# Patient Record
Sex: MALE | Race: WHITE | NOT HISPANIC OR LATINO | Employment: FULL TIME | URBAN - METROPOLITAN AREA
[De-identification: names, ages, dates, MRNs, and addresses within clinical notes are randomized per-mention and may not be internally consistent; named-entity substitution may affect disease eponyms.]

---

## 2017-09-26 ENCOUNTER — OFFICE VISIT (OUTPATIENT)
Dept: OCCUPATIONAL MEDICINE | Facility: CLINIC | Age: 22
End: 2017-09-26
Payer: OTHER MISCELLANEOUS

## 2017-09-26 VITALS
HEIGHT: 71 IN | DIASTOLIC BLOOD PRESSURE: 79 MMHG | SYSTOLIC BLOOD PRESSURE: 135 MMHG | HEART RATE: 52 BPM | WEIGHT: 150 LBS | RESPIRATION RATE: 18 BRPM | BODY MASS INDEX: 21 KG/M2 | TEMPERATURE: 98 F | OXYGEN SATURATION: 100 %

## 2017-09-26 DIAGNOSIS — T14.8XXA SPRAIN AND STRAIN: ICD-10-CM

## 2017-09-26 DIAGNOSIS — S33.5XXA LOW BACK SPRAIN, INITIAL ENCOUNTER: Primary | ICD-10-CM

## 2017-09-26 DIAGNOSIS — M54.6 ACUTE BILATERAL THORACIC BACK PAIN: ICD-10-CM

## 2017-09-26 DIAGNOSIS — M62.838 MUSCLE SPASM: ICD-10-CM

## 2017-09-26 PROCEDURE — 3008F BODY MASS INDEX DOCD: CPT | Mod: S$GLB,,, | Performed by: INTERNAL MEDICINE

## 2017-09-26 PROCEDURE — 99203 OFFICE O/P NEW LOW 30 MIN: CPT | Mod: S$GLB,,, | Performed by: INTERNAL MEDICINE

## 2017-09-26 RX ORDER — ZINC GLUCONATE 50 MG
50 TABLET ORAL DAILY
COMMUNITY
End: 2023-06-13

## 2017-09-26 RX ORDER — PNV NO.95/FERROUS FUM/FOLIC AC 28MG-0.8MG
100 TABLET ORAL DAILY
COMMUNITY
End: 2023-06-13

## 2017-09-26 NOTE — PROGRESS NOTES
Subjective:       Patient ID: Babar Casper is a 22 y.o. male.    Chief Complaint: Back Pain (Pt here for w/c visit for back pain. Pt states he was holding a bag of food on his shoulder and twisted and felt a shooting pain in his lower to mid back on 09/24/2017)    Back Pain   This is a new problem. The current episode started in the past 7 days. The problem occurs constantly. The problem has been gradually worsening since onset. The pain is present in the lumbar spine. The quality of the pain is described as shooting. The pain does not radiate. The pain is at a severity of 6/10. The pain is mild. The pain is the same all the time. The symptoms are aggravated by bending and sitting. Stiffness is present all day. Pertinent negatives include no abdominal pain, bladder incontinence, bowel incontinence, dysuria, leg pain, numbness, tingling or weakness. He has tried heat for the symptoms. The treatment provided no relief.     Review of Systems   Constitution: Negative for weakness and malaise/fatigue.   Skin: Negative for color change and rash.   Musculoskeletal: Positive for back pain, muscle cramps and stiffness. Negative for muscle weakness.   Gastrointestinal: Negative for abdominal pain and bowel incontinence.   Genitourinary: Negative for bladder incontinence, dysuria, hematuria and urgency.   Neurological: Negative for disturbances in coordination, numbness and tingling.       Objective:      Physical Exam   Constitutional: He is oriented to person, place, and time. Vital signs are normal. He appears well-developed and well-nourished. He is active and cooperative. No distress.   HENT:   Head: Normocephalic and atraumatic.   Nose: Nose normal.   Mouth/Throat: Oropharynx is clear and moist and mucous membranes are normal.   Eyes: Conjunctivae and lids are normal.   Neck: Trachea normal, normal range of motion, full passive range of motion without pain and phonation normal. Neck supple.   Cardiovascular: Normal  rate, regular rhythm, normal heart sounds, intact distal pulses and normal pulses.    Pulmonary/Chest: Effort normal and breath sounds normal.   Abdominal: Soft. Normal appearance and bowel sounds are normal. He exhibits no abdominal bruit, no pulsatile midline mass and no mass.   Musculoskeletal: He exhibits tenderness. He exhibits no edema or deformity.   Thoracic & Lumber back pain x2 days After lifting heavy weight at work/ Muscle spasm/ No fall/ no neuro  Deficit/seviarity of pain 6/ no swelling no redness/ SLE test bilaterally negative/Sensory motor & reflexes normal/  X ray Thoracic & Lumber spine normal/ Awaiting radiologies reopor   Neurological: He is alert and oriented to person, place, and time. He has normal strength and normal reflexes. He displays normal reflexes. No cranial nerve deficit. He exhibits normal muscle tone. Coordination normal.   Skin: Skin is warm, dry and intact. He is not diaphoretic.   Psychiatric: He has a normal mood and affect. His speech is normal and behavior is normal. Judgment and thought content normal. Cognition and memory are normal.   Nursing note and vitals reviewed.      Assessment:       1. Low back sprain, initial encounter    2. Acute bilateral thoracic back pain    3. Muscle spasm    4. Sprain and strain        Plan:                   No Follow-up on file.

## 2017-09-28 ENCOUNTER — OFFICE VISIT (OUTPATIENT)
Dept: OCCUPATIONAL MEDICINE | Facility: CLINIC | Age: 22
End: 2017-09-28
Payer: OTHER MISCELLANEOUS

## 2017-09-28 VITALS
OXYGEN SATURATION: 100 % | HEIGHT: 71 IN | SYSTOLIC BLOOD PRESSURE: 117 MMHG | WEIGHT: 150 LBS | DIASTOLIC BLOOD PRESSURE: 66 MMHG | BODY MASS INDEX: 21 KG/M2 | TEMPERATURE: 99 F | HEART RATE: 54 BPM | RESPIRATION RATE: 16 BRPM

## 2017-09-28 DIAGNOSIS — M54.9 BACK PAIN, UNSPECIFIED BACK LOCATION, UNSPECIFIED BACK PAIN LATERALITY, UNSPECIFIED CHRONICITY: Primary | ICD-10-CM

## 2017-09-28 PROCEDURE — 99213 OFFICE O/P EST LOW 20 MIN: CPT | Mod: S$GLB,,, | Performed by: FAMILY MEDICINE

## 2017-09-28 NOTE — PROGRESS NOTES
Subjective:       Patient ID: Babar Casper is a 22 y.o. male.    Chief Complaint: Back Injury (w/c follow up)    Back Pain   This is a new problem. The current episode started in the past 7 days. The problem occurs intermittently. The problem has been rapidly improving since onset. Quality: dull. The pain is at a severity of 1/10. The pain is mild. Pertinent negatives include no abdominal pain, bladder incontinence, bowel incontinence, dysuria, headaches, leg pain or numbness. He has tried nothing for the symptoms.     Review of Systems   Constitution: Negative for malaise/fatigue.   Skin: Negative for rash.   Musculoskeletal: Positive for back pain. Negative for muscle cramps, muscle weakness and stiffness.   Gastrointestinal: Negative for abdominal pain and bowel incontinence.   Genitourinary: Negative for bladder incontinence, dysuria, hematuria and urgency.   Neurological: Negative for disturbances in coordination, headaches and numbness.       Objective:      Physical Exam   Constitutional: He is oriented to person, place, and time. He appears well-developed and well-nourished.   HENT:   Head: Normocephalic and atraumatic.   Eyes: EOM are normal. Pupils are equal, round, and reactive to light.   Neck: Normal range of motion. Neck supple.   Musculoskeletal: Normal range of motion. He exhibits no tenderness.   Neurological: He is alert and oriented to person, place, and time.   Skin: Skin is warm and dry.       Assessment:       1. Back pain, unspecified back location, unspecified back pain laterality, unspecified chronicity        Plan:                Restrictions: Discharged from Occupational Health  No Follow-up on file.

## 2018-04-20 ENCOUNTER — OFFICE VISIT (OUTPATIENT)
Dept: URGENT CARE | Facility: CLINIC | Age: 23
End: 2018-04-20
Payer: COMMERCIAL

## 2018-04-20 VITALS
BODY MASS INDEX: 21 KG/M2 | RESPIRATION RATE: 18 BRPM | SYSTOLIC BLOOD PRESSURE: 116 MMHG | WEIGHT: 150 LBS | HEART RATE: 50 BPM | DIASTOLIC BLOOD PRESSURE: 65 MMHG | TEMPERATURE: 98 F | OXYGEN SATURATION: 99 % | HEIGHT: 71 IN

## 2018-04-20 DIAGNOSIS — M54.50 ACUTE RIGHT-SIDED LOW BACK PAIN WITHOUT SCIATICA: Primary | ICD-10-CM

## 2018-04-20 PROCEDURE — 99203 OFFICE O/P NEW LOW 30 MIN: CPT | Mod: S$GLB,,, | Performed by: FAMILY MEDICINE

## 2018-04-20 RX ORDER — NAPROXEN 500 MG/1
500 TABLET ORAL 2 TIMES DAILY WITH MEALS
Qty: 30 TABLET | Refills: 0 | Status: SHIPPED | OUTPATIENT
Start: 2018-04-20 | End: 2019-04-20

## 2018-04-20 RX ORDER — METHOCARBAMOL 500 MG/1
500 TABLET, FILM COATED ORAL 2 TIMES DAILY PRN
Qty: 30 TABLET | Refills: 0 | Status: SHIPPED | OUTPATIENT
Start: 2018-04-20 | End: 2018-04-30

## 2018-04-20 NOTE — PROGRESS NOTES
"Subjective:       Patient ID: Babar Casper is a 22 y.o. male.    Vitals:  height is 5' 11" (1.803 m) and weight is 68 kg (150 lb). His temperature is 98 °F (36.7 °C). His blood pressure is 116/65 and his pulse is 50 (abnormal). His respiration is 18 and oxygen saturation is 99%.     Chief Complaint: Back Pain    Lower back pain  After twisting the wrong way at work trying to catch a monkey. Worried that work would take action against him bc he got hurt and felt he needed a couple days off      Back Pain   This is a new problem. The current episode started in the past 7 days. The problem occurs constantly. The problem is unchanged. The pain is present in the lumbar spine. The pain radiates to the right thigh. The pain is at a severity of 5/10. Pertinent negatives include no abdominal pain, bladder incontinence, bowel incontinence, dysuria or numbness. He has tried NSAIDs for the symptoms. The treatment provided mild relief.     Review of Systems   Constitution: Negative for malaise/fatigue.   Skin: Negative for color change and rash.   Musculoskeletal: Positive for back pain. Negative for muscle cramps, muscle weakness and stiffness.   Gastrointestinal: Negative for abdominal pain and bowel incontinence.   Genitourinary: Negative for bladder incontinence, dysuria, hematuria and urgency.   Neurological: Negative for disturbances in coordination and numbness.       Objective:      Physical Exam   Constitutional: He is oriented to person, place, and time. Vital signs are normal. He appears well-developed and well-nourished. He is active and cooperative. No distress.   HENT:   Head: Normocephalic and atraumatic.   Nose: Nose normal.   Mouth/Throat: Mucous membranes are normal.   Eyes: Conjunctivae and lids are normal.   Neck: Trachea normal, normal range of motion, full passive range of motion without pain and phonation normal. Neck supple.   Cardiovascular: Normal rate and normal pulses.    Pulmonary/Chest: Effort " normal.   Abdominal: Normal appearance. He exhibits no abdominal bruit, no pulsatile midline mass and no mass.   Musculoskeletal: Normal range of motion. He exhibits tenderness. He exhibits no edema or deformity.        Lumbar back: He exhibits tenderness and pain. He exhibits no bony tenderness and no swelling.        Back:    Pain with straight leg raise and figure four in both legs   Neurological: He is alert and oriented to person, place, and time. He has normal strength and normal reflexes. No sensory deficit.   Skin: Skin is warm, dry and intact. He is not diaphoretic.   Psychiatric: He has a normal mood and affect. His speech is normal and behavior is normal. Judgment and thought content normal. Cognition and memory are normal.   Nursing note and vitals reviewed.      Assessment:       1. Acute right-sided low back pain without sciatica        Plan:         Acute right-sided low back pain without sciatica    Other orders  -     naproxen (NAPROSYN) 500 MG tablet; Take 1 tablet (500 mg total) by mouth 2 (two) times daily with meals.  Dispense: 30 tablet; Refill: 0  -     methocarbamol (ROBAXIN) 500 MG Tab; Take 1 tablet (500 mg total) by mouth 2 (two) times daily as needed.  Dispense: 30 tablet; Refill: 0        advised that he should stretch daily prior to and during work. Advised to tell supervisor abt incident in case of future issues.

## 2018-04-20 NOTE — LETTER
April 20, 2018      [facility] [address], [address]  [facility] [address]  Phone: [phone]  Fax: [phone]       Patient: Babar Casper   YOB: 1995  Date of Visit: 04/20/2018    To Whom It May Concern:    Sanjiv Casper  was at [facility] on 04/20/2018. Please excuse for 4/19-4/20. If you have any questions or concerns, or if I can be of further assistance, please do not hesitate to contact me.    Sincerely,    Raj Radford MD

## 2018-04-20 NOTE — PATIENT INSTRUCTIONS
NECK/BACK PAIN [General]  Both neck and back pain are usually caused by injury to the muscles or ligaments of the spine. Sometimes the disks that separate each bone of the spine may cause pain by putting pressure on a nearby nerve. Back and neck pain may appear after a sudden twisting/bending force (such as in a car accident), or sometimes after a simple awkward movement. In either case, muscle spasm is often present and adds to the pain.    Acute neck and back pain usually gets better in one to two weeks. Pain related to disk disease, arthritis in the spinal joints or spinal stenosis (narrowing of the spinal canal) can become chronic and last for months or years.    Unless you had a forceful physical injury (for example, a car accident or fall), X-rays are usually not ordered for the initial evaluation of neck pain due to the risk of unnecessary radiation caused by xrays . If your pain does not respond to treatment course, x-rays and other tests may be performed at a later time.     Pain is generally related to inflammatory process that results from the injury.Treatment is focused to anti-inflammatory medicine and muscle relaxants. Narcotics have not been shown to be beneficial for acute low back or neck pain but actually may do some harm because it actually masks the pain and you may perform tasks you should not because the pain reflex has been blocked.       HOME CARE:  1. FOR NECK PAIN: Use a comfortable pillow that supports the head and keeps the spine in a neutral position. The position of the head should not be tilted forward or backward.  FOR BACK PAIN: You can stay in bed the first few days. But, as soon as possible, begin sitting or walking to avoid problems with prolonged bed rest (muscle weakness, worsening back stiffness and pain, blood clots in the legs).  2. When in bed, try to find a position of comfort. A firm mattress is best. Try lying flat on your back with pillows under your knees. You can also  try lying on your side with your knees bent up towards your chest and a pillow between your knees.  3. Avoid prolonged sitting. This puts more stress on the lower back than standing or walking.  4. During the first two days after injury, apply an ICE PACK to the painful area for 20 minutes every 2-4 hours. This will reduce swelling and pain. HEAT (hot shower, hot bath or heating pad) works well for muscle spasm. You can start with ice, then switch to heat after two days. Some patients feel best alternating ice and heat treatments. Use the one method that feels the best to you.  5. You may use acetaminophen (Tylenol) or ibuprofen (Motrin, Advil) to control pain, unless another pain medicine was prescribed. [NOTE: If you have chronic liver or kidney disease or ever had a stomach ulcer or GI bleeding, talk with your doctor before using these medicines.] Do not take these medications if it is a known allergy.  6. Be aware of safe lifting methods and do not lift anything over 15 pounds until all the pain is gone.    FOLLOW UP with your physician or this facility if your symptoms do not start to improve after one week. Physical therapy or further tests may be needed.      GET PROMPT MEDICAL ATTENTION if any of the following occur:  Pain becomes worse or spreads into your arms or legs  Weakness, numbness or pain in one or both arms or legs  Loss of bowel or bladder control  Numbness in the groin area  Difficulty walking  Fever over 100.0ºF (37.8ºC)    NECK SPRAIN or STRAIN    A sudden force that causes turning or bending of the neck (such as in a car accident) can stretch or tear muscles (strain) and ligaments (sprain) and cause neck pain. Sometimes neck pain occurs after a simple awkward movement. In either case, muscle spasm is commonly present and contributes to the pain.     HOME CARE:    1) You may feel more soreness and spasm the first few days after the injury. Reduce your activity level until symptoms begin to  improve.    2) When lying down, use a comfortable pillow that supports the head and keeps the spine in a neutral position. The position of the head should not be tilted forward or backward.    3) Use ice packs (ice in a plastic bag, wrapped in a towel) to treat acute pain. Apply for 20 minutes every 2-4 hours during the first two days. Then, begin local heat (hot shower, hot bath or heating pad) and massage to reduce muscle spasm. Some patients feel best alternating hot and cold treatments, or just staying with one method only. Do what feels the best to you and gives the most relief.    4) You may use acetaminophen (Tylenol) or ibuprofen (Motrin, Advil) to control pain, unless another pain medicine was prescribed. [ NOTE : If you have chronic liver or kidney disease or ever had a stomach ulcer or GI bleeding, talk with your doctor before using these medicines.]    FOLLOW UP with your physician or this facility if your symptoms do not show signs of improvement after one week. Physical therapy may be needed.    GET PROMPT MEDICAL ATTENTION if any of the following occur:  -- Pain becomes worse or spreads into your arms  -- Weakness or numbness in one or both arms      NEUROPATHY    is a condition that affects the nerves of the arms or legs. It causes a change in physical feeling. Sometimes it causes weakness in the muscles. You may feel tingling, numbness or shooting pains (especially at night). You may be sensitive to light touch or temperature changes.    Neuropathy may be caused by being exposed to certain drugs or chemicals, or because of a vitamin deficiency. It can be a complication of a chronic disease such as diabetes or alcoholism. A muscle spasm or bulging/ruptured disk with pressure on the nerve may also cause this condition.    HOME CARE:    1) You may take acetaminophen (Tylenol) or ibuprofen (Advil, Motrin) for pain, unless another pain medicine has been prescribed.    2) If the neuropathy affects your  feet, keep your toenails trimmed and wash your feet often. Wear shoes that fit well. Doing so avoids pressure points, blisters and ulcers. Due to a loss of feeling, you may not notice injuries, so look at your feet carefully (including the soles of your feet and between your toes) at least once a week. Tell your doctor if you have any open wounds or signs of infection.    3) If vitamins have been prescribed, be sure to remind yourself to take them daily.    FOLLOW UP with your doctor or as advised by our staff. You may need further testing to find out the exact cause of your neuropathy.    GET PROMPT MEDICAL ATTENTION if any of the following occur:    -- Redness, swelling or pus coming from the toes or feet    -- Loss of bowel or bladder control (if this is a new symptom for you)    -- Muscle weakness (if this is a new symptom for you)

## 2018-04-23 ENCOUNTER — TELEPHONE (OUTPATIENT)
Dept: URGENT CARE | Facility: CLINIC | Age: 23
End: 2018-04-23

## 2018-06-19 ENCOUNTER — OFFICE VISIT (OUTPATIENT)
Dept: URGENT CARE | Facility: CLINIC | Age: 23
End: 2018-06-19
Payer: COMMERCIAL

## 2018-06-19 VITALS
TEMPERATURE: 98 F | WEIGHT: 150 LBS | SYSTOLIC BLOOD PRESSURE: 127 MMHG | OXYGEN SATURATION: 100 % | BODY MASS INDEX: 21 KG/M2 | HEIGHT: 71 IN | RESPIRATION RATE: 17 BRPM | HEART RATE: 45 BPM | DIASTOLIC BLOOD PRESSURE: 70 MMHG

## 2018-06-19 DIAGNOSIS — R10.9 STOMACH PAIN: ICD-10-CM

## 2018-06-19 DIAGNOSIS — K59.1 FUNCTIONAL DIARRHEA: Primary | ICD-10-CM

## 2018-06-19 PROCEDURE — 99214 OFFICE O/P EST MOD 30 MIN: CPT | Mod: S$GLB,,, | Performed by: PHYSICIAN ASSISTANT

## 2018-06-19 NOTE — PATIENT INSTRUCTIONS
Treating Diarrhea    Diarrhea happens when you have loose, watery, or frequent bowel movements. It is a common problem with many causes. Most cases of diarrhea clear up on their own. But certain cases may need treatment. Be sure to see your healthcare provider if your symptoms do not improve within a few days.  Getting relief  Treatment of diarrhea depends on its cause. Diarrhea caused by bacterial or parasite infection is often treated with antibiotics. Diarrhea caused by other factors, such as a stomach virus, often improves with simple home treatment. The tips below may also help relieve your symptoms.  · Drink plenty of fluids. This helps prevent too much fluid loss (dehydration). Water, clear soups, and electrolyte solutions are good choices. Avoid alcohol, coffee, tea, and milk. These can irritate your intestines and make symptoms worse.  · Suck on ice chips if drinking makes you queasy.  · Return to your normal diet slowly. You may want to eat bland foods at first, such as rice and toast. Also, you may need to avoid certain foods for a while, such as dairy products. These can make symptoms worse. Ask your healthcare provider if there are any other foods you should avoid.  · If you were prescribed antibiotics, take them as directed.  · Do not take anti-diarrhea medicines without asking your healthcare provider first.  Call your healthcare provider   Call your healthcare provider if you have any of the following:   · A fever of 100.4°F (38.0°C) or higher, or as directed by your healthcare provider  · Severe pain  · Worsening diarrhea or diarrhea for more than 2 days  · Bloody vomit or stool  · Signs of dehydration (dizziness, dry mouth and tongue, rapid pulse, dark urine)  Date Last Reviewed: 7/1/2016 © 2000-2017 [employer]. [address] [place] [address], [place] [address]. All rights reserved. This information is not intended as a substitute for professional medical care. Always follow your  healthcare professional's instructions.        Gastritis (Adult)    Gastritis is inflammation and irritation of the stomach lining. It can be present for a short time (acute) or be long lasting (chronic). Gastritis is often caused by infection with bacteria called H pylori. More than a third of people in the [place] have this bacteria in their bodies. In many cases, H pylori causes no problems or symptoms. In some people, though, the infection irritates the stomach lining and causes gastritis. Other causes of stomach irritation include drinking alcohol or taking pain-relieving medicines called NSAIDs (such as aspirin or ibuprofen).   Symptoms of gastritis can include:  · Abdominal pain or bloating  · Loss of appetite  · Nausea or vomiting  · Vomiting blood or having black stools  · Feeling more tired than usual  An inflamed and irritated stomach lining is more likely to develop a sore called an ulcer. To help prevent this, gastritis should be treated.  Home care  If needed, medicines may be prescribed. If you have H pylori infection, treating it will likely relieve your symptoms. Other changes can help reduce stomach irritation and help it heal.  · If you have been prescribed medicines for H pylori infection, take them as directed. Take all of the medicine until it is finished or your healthcare provider tells you to stop, even if you feel better.  · Your healthcare provider may recommend avoiding NSAIDs. If you take daily aspirin for your heart or other medical reasons, do not stop without talking to your healthcare provider first.  · Avoid drinking alcohol.  · Stop smoking. Smoking can irritate the stomach and delay healing. As much as possible, stay away from second hand smoke.  Follow-up care  Follow up with your healthcare provider, or as advised by our staff. Testing may be needed to check for inflammation or an ulcer.  When to seek medical advice  Call your healthcare provider for any of the following:  · Stomach  pain that gets worse or moves to the lower right abdomen (appendix area)  · Chest pain that appears or gets worse, or spreads to the back, neck, shoulder, or arm  · Frequent vomiting (cant keep down liquids)  · Blood in the stool or vomit (red or black in color)  · Feeling weak or dizzy  · Fever of 100.4ºF (38ºC) or higher, or as directed by your healthcare provider  Date Last Reviewed: 6/22/2015  © 0532-2716 [employer]. [address] [place] [address]. All rights reserved. This information is not intended as a substitute for professional medical care. Always follow your healthcare professional's instructions.      If you were prescribed a narcotic medication, do not drive or operate heavy equipment or machinery while taking these medications.  You must understand that you've received an Urgent Care treatment only and that you may be released before all your medical problems are known or treated. You, the patient, will arrange for follow up care as instructed.  Follow up with your PCP or specialty clinic as directed in the next 1-2 weeks if not improved or as needed.  You can call [phone] to schedule an appointment with the appropriate provider.  If your condition worsens we recommend that you receive another evaluation at the emergency room immediately or contact your primary medical clinics after hours call service to discuss your concerns.  Please return here or go to the Emergency Department for any concerns or worsening of condition.

## 2018-06-19 NOTE — LETTER
June 19, 2018      [facility] [address], [address]  [place] [address]  Phone: [phone]  Fax: [phone]       Patient: Babar Casper   YOB: 1995  Date of Visit: 06/19/2018    To Whom It May Concern:    Sanjiv Casper  was at [facility] on 06/19/2018. He may return to work/school on 6/21/18 with no restrictions. If you have any questions or concerns, or if I can be of further assistance, please do not hesitate to contact me.    Sincerely,    Lon Hobson PA-C

## 2018-06-19 NOTE — PROGRESS NOTES
"Subjective:       Patient ID: Babar Casper is a 22 y.o. male.    Vitals:  height is 5' 11" (1.803 m) and weight is 68 kg (150 lb). His oral temperature is 97.6 °F (36.4 °C). His blood pressure is 127/70 and his pulse is 45 (abnormal). His respiration is 17 and oxygen saturation is 100%.     Chief Complaint: Abdominal Pain    Patient states he went out to a local pizza place on Friday, drank "too much beer" and had a lot of pizza. He hasn't eaten wheat, gluten or dairy "in years"; Patient complains of stomach pain, nausea, bloating and diarrhea. Symptoms began on Sunday, symptoms have since worsened. Patient states it is now a burning sensation. Last time patient ate anything was yesterday around 6pm, patient has only been drinking water. Patient denies vomiting. No recent illness, hospitalization or abx use.      Abdominal Pain   This is a new problem. The current episode started in the past 7 days. The problem occurs constantly. The problem has been gradually worsening. The pain is located in the periumbilical region. The pain is at a severity of 4/10. The pain is mild. The quality of the pain is burning. Associated symptoms include diarrhea and nausea. Pertinent negatives include no constipation, dysuria, fever, hematochezia, melena or vomiting. Nothing aggravates the pain. The pain is relieved by nothing. He has tried nothing for the symptoms.     Review of Systems   Constitution: Negative for chills and fever.   Cardiovascular: Negative for chest pain.   Respiratory: Negative for shortness of breath.    Musculoskeletal: Negative for back pain.   Gastrointestinal: Positive for abdominal pain, diarrhea and nausea. Negative for constipation, hematochezia, melena and vomiting.   Genitourinary: Negative for dysuria.       Objective:      Physical Exam   Constitutional: He is oriented to person, place, and time. He appears well-developed and well-nourished.   HENT:   Head: Normocephalic and atraumatic.   Right Ear: " External ear normal.   Left Ear: External ear normal.   Nose: Nose normal.   Mouth/Throat: Mucous membranes are normal.   Eyes: Conjunctivae and lids are normal.   Neck: Trachea normal and full passive range of motion without pain. Neck supple.   Cardiovascular: Normal rate, regular rhythm and normal heart sounds.    Pulmonary/Chest: Effort normal and breath sounds normal. No respiratory distress.   Abdominal: Soft. Normal appearance and bowel sounds are normal. He exhibits no distension, no abdominal bruit, no pulsatile midline mass and no mass. There is no tenderness. There is no rigidity, no rebound, no guarding, no CVA tenderness, no tenderness at McBurney's point and negative Kwong's sign.   Musculoskeletal: Normal range of motion. He exhibits no edema.   Neurological: He is alert and oriented to person, place, and time. He has normal strength.   Skin: Skin is warm, dry and intact. He is not diaphoretic. No pallor.   Psychiatric: He has a normal mood and affect. His speech is normal and behavior is normal. Judgment and thought content normal. Cognition and memory are normal.   Nursing note and vitals reviewed.      Assessment:       1. Functional diarrhea    2. Stomach pain        Plan:         Functional diarrhea    Stomach pain        Treating Diarrhea    Diarrhea happens when you have loose, watery, or frequent bowel movements. It is a common problem with many causes. Most cases of diarrhea clear up on their own. But certain cases may need treatment. Be sure to see your healthcare provider if your symptoms do not improve within a few days.  Getting relief  Treatment of diarrhea depends on its cause. Diarrhea caused by bacterial or parasite infection is often treated with antibiotics. Diarrhea caused by other factors, such as a stomach virus, often improves with simple home treatment. The tips below may also help relieve your symptoms.  · Drink plenty of fluids. This helps prevent too much fluid loss  (dehydration). Water, clear soups, and electrolyte solutions are good choices. Avoid alcohol, coffee, tea, and milk. These can irritate your intestines and make symptoms worse.  · Suck on ice chips if drinking makes you queasy.  · Return to your normal diet slowly. You may want to eat bland foods at first, such as rice and toast. Also, you may need to avoid certain foods for a while, such as dairy products. These can make symptoms worse. Ask your healthcare provider if there are any other foods you should avoid.  · If you were prescribed antibiotics, take them as directed.  · Do not take anti-diarrhea medicines without asking your healthcare provider first.  Call your healthcare provider   Call your healthcare provider if you have any of the following:   · A fever of 100.4°F (38.0°C) or higher, or as directed by your healthcare provider  · Severe pain  · Worsening diarrhea or diarrhea for more than 2 days  · Bloody vomit or stool  · Signs of dehydration (dizziness, dry mouth and tongue, rapid pulse, dark urine)  Date Last Reviewed: 7/1/2016 © 2000-2017 [employer]. [address] [place] [address]. All rights reserved. This information is not intended as a substitute for professional medical care. Always follow your healthcare professional's instructions.        Gastritis (Adult)    Gastritis is inflammation and irritation of the stomach lining. It can be present for a short time (acute) or be long lasting (chronic). Gastritis is often caused by infection with bacteria called H pylori. More than a third of people in the [place] have this bacteria in their bodies. In many cases, H pylori causes no problems or symptoms. In some people, though, the infection irritates the stomach lining and causes gastritis. Other causes of stomach irritation include drinking alcohol or taking pain-relieving medicines called NSAIDs (such as aspirin or ibuprofen).   Symptoms of gastritis can include:  · Abdominal pain  or bloating  · Loss of appetite  · Nausea or vomiting  · Vomiting blood or having black stools  · Feeling more tired than usual  An inflamed and irritated stomach lining is more likely to develop a sore called an ulcer. To help prevent this, gastritis should be treated.  Home care  If needed, medicines may be prescribed. If you have H pylori infection, treating it will likely relieve your symptoms. Other changes can help reduce stomach irritation and help it heal.  · If you have been prescribed medicines for H pylori infection, take them as directed. Take all of the medicine until it is finished or your healthcare provider tells you to stop, even if you feel better.  · Your healthcare provider may recommend avoiding NSAIDs. If you take daily aspirin for your heart or other medical reasons, do not stop without talking to your healthcare provider first.  · Avoid drinking alcohol.  · Stop smoking. Smoking can irritate the stomach and delay healing. As much as possible, stay away from second hand smoke.  Follow-up care  Follow up with your healthcare provider, or as advised by our staff. Testing may be needed to check for inflammation or an ulcer.  When to seek medical advice  Call your healthcare provider for any of the following:  · Stomach pain that gets worse or moves to the lower right abdomen (appendix area)  · Chest pain that appears or gets worse, or spreads to the back, neck, shoulder, or arm  · Frequent vomiting (cant keep down liquids)  · Blood in the stool or vomit (red or black in color)  · Feeling weak or dizzy  · Fever of 100.4ºF (38ºC) or higher, or as directed by your healthcare provider  Date Last Reviewed: 6/22/2015 © 2000-2017 [employer]. [address] [place] [address], [place] [address]. All rights reserved. This information is not intended as a substitute for professional medical care. Always follow your healthcare professional's instructions.      If you were prescribed a narcotic  medication, do not drive or operate heavy equipment or machinery while taking these medications.  You must understand that you've received an Urgent Care treatment only and that you may be released before all your medical problems are known or treated. You, the patient, will arrange for follow up care as instructed.  Follow up with your PCP or specialty clinic as directed in the next 1-2 weeks if not improved or as needed.  You can call [phone] to schedule an appointment with the appropriate provider.  If your condition worsens we recommend that you receive another evaluation at the emergency room immediately or contact your primary medical clinics after hours call service to discuss your concerns.  Please return here or go to the Emergency Department for any concerns or worsening of condition.

## 2018-06-22 ENCOUNTER — TELEPHONE (OUTPATIENT)
Dept: URGENT CARE | Facility: CLINIC | Age: 23
End: 2018-06-22

## 2018-07-02 ENCOUNTER — OFFICE VISIT (OUTPATIENT)
Dept: OCCUPATIONAL MEDICINE | Facility: CLINIC | Age: 23
End: 2018-07-02
Payer: COMMERCIAL

## 2018-07-02 VITALS
HEIGHT: 71 IN | OXYGEN SATURATION: 100 % | RESPIRATION RATE: 18 BRPM | SYSTOLIC BLOOD PRESSURE: 121 MMHG | HEART RATE: 57 BPM | BODY MASS INDEX: 21 KG/M2 | TEMPERATURE: 97 F | WEIGHT: 150 LBS | DIASTOLIC BLOOD PRESSURE: 67 MMHG

## 2018-07-02 DIAGNOSIS — S01.119A LACERATION OF EYEBROW AND FOREHEAD, INITIAL ENCOUNTER: Primary | ICD-10-CM

## 2018-07-02 DIAGNOSIS — S01.81XA LACERATION OF EYEBROW AND FOREHEAD, INITIAL ENCOUNTER: Primary | ICD-10-CM

## 2018-07-02 PROCEDURE — 99203 OFFICE O/P NEW LOW 30 MIN: CPT | Mod: 25,S$GLB,, | Performed by: FAMILY MEDICINE

## 2018-07-02 PROCEDURE — 12011 RPR F/E/E/N/L/M 2.5 CM/<: CPT | Mod: S$GLB,,, | Performed by: FAMILY MEDICINE

## 2018-07-02 PROCEDURE — 90471 IMMUNIZATION ADMIN: CPT | Mod: S$GLB,,, | Performed by: FAMILY MEDICINE

## 2018-07-02 PROCEDURE — 90714 TD VACC NO PRESV 7 YRS+ IM: CPT | Mod: S$GLB,,, | Performed by: FAMILY MEDICINE

## 2018-07-02 RX ORDER — MUPIROCIN 20 MG/G
OINTMENT TOPICAL
Qty: 22 G | Refills: 1 | Status: SHIPPED | OUTPATIENT
Start: 2018-07-02 | End: 2023-06-13

## 2018-07-02 NOTE — PATIENT INSTRUCTIONS
You have a work related injury. Medical care and treatment required as a result of a work-related injury  should be focused on restoring functional ability required to meet the patient?s daily and work activities and return to work, while striving to restore the patient?s health to its pre-injury status in so far as is feasible.  Some OTC measures to help in recovery:  Tylenol 325mg  Ibuprofen 400mg  Magnesium   Ice/Heat pack  Stretching/ROM exercises    Face Laceration: Suture or Tape  A laceration is a cut through the skin. This will require stitches if it is deep. Minor cuts may be treated with surgical tape.    Home care  · Your healthcare provider may prescribe an antibiotic. This is to help prevent infection. Follow all instructions for taking this medicine. Take the medicine every day until it is gone or you are told to stop. You should not have any left over.  · The healthcare provider may prescribe medicines for pain. Follow instructions for taking them.  · Follow the healthcare providers instructions on how to care for the cut.  · Wash your hands with soap and warm water before and after caring for the cut. This helps prevent infection.  · If a bandage was applied and it becomes wet or dirty, replace it. Otherwise, leave it in place for the first 24 hours, then change it once a day or as directed.  · If sutures were used, clean the wound daily:  ¨ After removing the bandage, wash the area with soap and water. Use a wet cotton swab to loosen and remove any blood or crust that forms.  ¨ After cleaning, keep the wound clean and dry. Talk with your doctor before applying any antibiotic ointment to the wound. Reapply a fresh bandage.  ¨ You may remove the bandage to shower as usual after the first 24 hours, but do not soak the area in water (no swimming) until the sutures are removed.  · If surgical tape was used, keep the area clean and dry. If it becomes wet, blot it dry with a towel.  · Most facial skin  wounds heal without problems. However, an infection sometimes occurs despite proper treatment. Therefore, watch for the signs of infection listed below.  Follow-up care  Follow up with your healthcare provider as advised. Be sure to return for suture removal as directed. Ask your provider how long sutures should remain in place. If surgical tape closures were used, you may remove them yourself when your provider recommends if they have not fallen off on their own.  When to seek medical advice  Call your healthcare provider right away if any of these occur:  · Wound bleeding not controlled by direct pressure  · Signs of infection, including increasing pain in the wound, increasing wound redness or swelling, or pus or bad odor coming from the wound  · Fever of 100.4°F (38ºC) or higher or as directed by your healthcare provider  · Stitches come apart or fall out or surgical tape falls off before 5 days  · Wound edges re-open  · Wound changes colors  · Numbness around the wound   Date Last Reviewed: 6/14/2015  © 9827-9570 [employer] [address] [place] [address], [place] [address]. All rights reserved. This information is not intended as a substitute for professional medical care. Always follow your healthcare professional's instructions.

## 2018-07-02 NOTE — PROCEDURES
Laceration Repair  Date/Time: 7/2/2018 10:53 AM  Performed by: KENN BAUGH  Authorized by: KENN BAUGH   Body area: head/neck  Location details: left eyebrow  Laceration length: 1.2 cm  Foreign bodies: no foreign bodies  Tendon involvement: none  Nerve involvement: none  Anesthesia: local infiltration    Anesthesia:  Local Anesthetic: lidocaine 1% without epinephrine  Anesthetic total: 2 mL  Tendon closure: 5-0 Prolene  Number of sutures: 3  Technique: simple  Approximation: loose  Approximation difficulty: simple  Dressing: adhesive bandage  Comments: 2 simple 1 corner stitch placed

## 2018-07-02 NOTE — PROGRESS NOTES
Subjective:       Patient ID: Babar Casper is a 22 y.o. male.    Chief Complaint: Laceration    Patient was hit on left eye with a barrel at work.      Laceration    The incident occurred 1 to 3 hours ago. The laceration is located on the face and left eye. The laceration mechanism was a blunt object. He reports no foreign bodies present. His tetanus status is out of date.     Review of Systems   Constitution: Negative for chills and fever.   HENT: Negative for sore throat.    Eyes: Negative for blurred vision.   Cardiovascular: Negative for chest pain.   Respiratory: Negative for shortness of breath.    Skin: Positive for poor wound healing. Negative for rash.   Musculoskeletal: Negative for back pain and joint pain.   Gastrointestinal: Negative for abdominal pain, diarrhea, nausea and vomiting.   Neurological: Negative for headaches.   Psychiatric/Behavioral: The patient is not nervous/anxious.        Objective:      Physical Exam   Constitutional: He is oriented to person, place, and time. He appears well-developed and well-nourished.   HENT:   Head: Normocephalic. Head is with laceration (1.2cm jagged). Head is without abrasion and without contusion.       Right Ear: External ear normal.   Left Ear: External ear normal.   Nose: Nose normal.   Mouth/Throat: Oropharynx is clear and moist.   Eyes: Conjunctivae, EOM and lids are normal. Pupils are equal, round, and reactive to light.   Neck: Trachea normal, full passive range of motion without pain and phonation normal. Neck supple.   Cardiovascular: Normal rate and regular rhythm.    Pulmonary/Chest: Effort normal. No stridor. No respiratory distress.   Musculoskeletal: Normal range of motion.   Neurological: He is alert and oriented to person, place, and time.   Skin: Skin is warm, dry and intact. Capillary refill takes less than 2 seconds. No abrasion, no bruising, no burn, no ecchymosis, no laceration, no lesion and no rash noted. No erythema.   Psychiatric:  He has a normal mood and affect. His speech is normal and behavior is normal. Judgment and thought content normal. Cognition and memory are normal.   Nursing note and vitals reviewed.      Assessment:       1. Laceration of eyebrow and forehead, initial encounter        Plan:           Medications Ordered This Encounter      mupirocin (BACTROBAN) 2 % ointment          Sig: Apply to affected area 3 times daily          Dispense:  22 g          Refill:  1         No Follow-up on file.    5 days for suture removal

## 2018-07-06 ENCOUNTER — OFFICE VISIT (OUTPATIENT)
Dept: OCCUPATIONAL MEDICINE | Facility: CLINIC | Age: 23
End: 2018-07-06
Payer: COMMERCIAL

## 2018-07-06 VITALS
DIASTOLIC BLOOD PRESSURE: 66 MMHG | OXYGEN SATURATION: 100 % | HEIGHT: 71 IN | TEMPERATURE: 99 F | RESPIRATION RATE: 16 BRPM | WEIGHT: 150 LBS | SYSTOLIC BLOOD PRESSURE: 128 MMHG | BODY MASS INDEX: 21 KG/M2 | HEART RATE: 64 BPM

## 2018-07-06 DIAGNOSIS — S01.112D LACERATION OF LEFT EYEBROW, SUBSEQUENT ENCOUNTER: Primary | ICD-10-CM

## 2018-07-06 PROCEDURE — 99214 OFFICE O/P EST MOD 30 MIN: CPT | Mod: S$GLB,,, | Performed by: FAMILY MEDICINE

## 2018-07-06 NOTE — PROGRESS NOTES
Subjective:       Patient ID: Babar Casper is a 22 y.o. male.    Chief Complaint: Suture / Staple Removal    Suture / Staple Removal   The sutures were placed 3 to 6 days ago. He tried nothing since the wound repair. The treatment provided significant relief. His temperature was unmeasured prior to arrival. There has been no drainage from the wound. There is no redness present. There is no swelling present. There is no pain present. He has no difficulty moving the affected extremity or digit.     Review of Systems   Constitution: Negative for chills and fever.   HENT: Negative for sore throat.    Eyes: Negative for blurred vision.   Cardiovascular: Negative for chest pain.   Respiratory: Negative for shortness of breath.    Skin: Negative for rash.   Musculoskeletal: Negative for back pain and joint pain.   Gastrointestinal: Negative for abdominal pain, diarrhea, nausea and vomiting.   Neurological: Negative for headaches.   Psychiatric/Behavioral: The patient is not nervous/anxious.        Objective:      Physical Exam   Constitutional: He is oriented to person, place, and time. He appears well-developed and well-nourished.   HENT:   Head: Normocephalic and atraumatic.   Nose: Nose normal.   Eyes: Conjunctivae, EOM and lids are normal. Pupils are equal, round, and reactive to light.       Neck: Trachea normal, full passive range of motion without pain and phonation normal. Neck supple.   Musculoskeletal: Normal range of motion.   Neurological: He is alert and oriented to person, place, and time.   Skin: Skin is warm, dry and intact.   Psychiatric: He has a normal mood and affect. His speech is normal and behavior is normal. Judgment and thought content normal. Cognition and memory are normal.   Nursing note and vitals reviewed.      Assessment:       1. Laceration of left eyebrow, subsequent encounter        Plan:          removed 3 sutures without issue. 1 steristrip placed      Restrictions: Regular Duty,  Discharged from Occupational Health  No Follow-up on file.

## 2018-07-31 ENCOUNTER — OFFICE VISIT (OUTPATIENT)
Dept: URGENT CARE | Facility: CLINIC | Age: 23
End: 2018-07-31
Payer: COMMERCIAL

## 2018-07-31 VITALS
HEIGHT: 70 IN | HEART RATE: 63 BPM | SYSTOLIC BLOOD PRESSURE: 118 MMHG | BODY MASS INDEX: 21.47 KG/M2 | DIASTOLIC BLOOD PRESSURE: 68 MMHG | WEIGHT: 150 LBS | RESPIRATION RATE: 18 BRPM | OXYGEN SATURATION: 100 % | TEMPERATURE: 98 F

## 2018-07-31 DIAGNOSIS — R11.0 NAUSEA: ICD-10-CM

## 2018-07-31 DIAGNOSIS — B34.9 VIRAL ILLNESS: Primary | ICD-10-CM

## 2018-07-31 PROCEDURE — 96372 THER/PROPH/DIAG INJ SC/IM: CPT | Mod: S$GLB,,, | Performed by: FAMILY MEDICINE

## 2018-07-31 PROCEDURE — 99214 OFFICE O/P EST MOD 30 MIN: CPT | Mod: 25,S$GLB,, | Performed by: PHYSICIAN ASSISTANT

## 2018-07-31 RX ORDER — ONDANSETRON 4 MG/1
4 TABLET, FILM COATED ORAL DAILY PRN
Qty: 30 TABLET | Refills: 1 | Status: SHIPPED | OUTPATIENT
Start: 2018-07-31 | End: 2018-09-27

## 2018-07-31 RX ORDER — BETAMETHASONE SODIUM PHOSPHATE AND BETAMETHASONE ACETATE 3; 3 MG/ML; MG/ML
6 INJECTION, SUSPENSION INTRA-ARTICULAR; INTRALESIONAL; INTRAMUSCULAR; SOFT TISSUE
Status: COMPLETED | OUTPATIENT
Start: 2018-07-31 | End: 2018-07-31

## 2018-07-31 RX ORDER — FLUTICASONE PROPIONATE 50 MCG
1 SPRAY, SUSPENSION (ML) NASAL DAILY
Qty: 1 BOTTLE | Refills: 1 | Status: SHIPPED | OUTPATIENT
Start: 2018-07-31 | End: 2018-09-27

## 2018-07-31 RX ADMIN — BETAMETHASONE SODIUM PHOSPHATE AND BETAMETHASONE ACETATE 6 MG: 3; 3 INJECTION, SUSPENSION INTRA-ARTICULAR; INTRALESIONAL; INTRAMUSCULAR; SOFT TISSUE at 08:07

## 2018-07-31 NOTE — PROGRESS NOTES
"Subjective:       Patient ID: Babar Casper is a 22 y.o. male.    Vitals:  height is 5' 10" (1.778 m) and weight is 68 kg (150 lb). His oral temperature is 97.8 °F (36.6 °C). His blood pressure is 118/68 and his pulse is 63. His respiration is 18 and oxygen saturation is 100%.     Chief Complaint: Headache and Sinus Problem    Headache    This is a new problem. The current episode started in the past 7 days (3 days ). The problem has been gradually worsening. Associated symptoms include nausea and sinus pressure. Pertinent negatives include no abdominal pain, coughing, eye redness, fever or sore throat.   Sinus Problem   This is a new problem. The current episode started in the past 7 days (3 days ). The problem has been gradually worsening since onset. Associated symptoms include headaches and sinus pressure. Pertinent negatives include no chills, congestion, coughing, hoarse voice, shortness of breath or sore throat. (Post nasal drip ) Past treatments include nothing (essential oils use).     Review of Systems   Constitution: Negative for chills, fever and malaise/fatigue.   HENT: Positive for sinus pressure. Negative for congestion, hoarse voice and sore throat.    Eyes: Negative for discharge and redness.   Cardiovascular: Negative for chest pain, dyspnea on exertion and leg swelling.   Respiratory: Negative for cough, shortness of breath, sputum production and wheezing.    Musculoskeletal: Negative for myalgias.   Gastrointestinal: Positive for nausea. Negative for abdominal pain.   Neurological: Positive for headaches.       Objective:      Physical Exam   Constitutional: He is oriented to person, place, and time. He appears well-developed and well-nourished. He is cooperative.  Non-toxic appearance. He does not appear ill. No distress.   HENT:   Head: Normocephalic and atraumatic.   Right Ear: Hearing, external ear and ear canal normal. Tympanic membrane is bulging (clear).   Left Ear: Hearing, external ear " and ear canal normal. Tympanic membrane is bulging (clear).   Nose: Nose normal. No mucosal edema, rhinorrhea or nasal deformity. No epistaxis. Right sinus exhibits no maxillary sinus tenderness and no frontal sinus tenderness. Left sinus exhibits no maxillary sinus tenderness and no frontal sinus tenderness.   Mouth/Throat: Uvula is midline, oropharynx is clear and moist and mucous membranes are normal. No trismus in the jaw. Normal dentition. No uvula swelling. No posterior oropharyngeal erythema.   Eyes: Conjunctivae and lids are normal. No scleral icterus.   Sclera clear bilat   Neck: Trachea normal, full passive range of motion without pain and phonation normal. Neck supple.   Cardiovascular: Normal rate, regular rhythm, normal heart sounds, intact distal pulses and normal pulses.    Pulmonary/Chest: Effort normal and breath sounds normal. No respiratory distress.   Abdominal: Soft. Normal appearance and bowel sounds are normal. He exhibits no distension. There is no tenderness.   Musculoskeletal: Normal range of motion. He exhibits no edema or deformity.   Neurological: He is alert and oriented to person, place, and time. He exhibits normal muscle tone. Coordination normal.   Skin: Skin is warm, dry and intact. He is not diaphoretic. No pallor.   Psychiatric: He has a normal mood and affect. His speech is normal and behavior is normal. Judgment and thought content normal. Cognition and memory are normal.   Nursing note and vitals reviewed.      Assessment:       1. Viral illness    2. Nausea        Plan:         Viral illness  -     betamethasone acetate-betamethasone sodium phosphate injection 6 mg; Inject 1 mL (6 mg total) into the muscle one time.  -     ondansetron (ZOFRAN) 4 MG tablet; Take 1 tablet (4 mg total) by mouth daily as needed for Nausea.  Dispense: 30 tablet; Refill: 1  -     sodium chloride (OCEAN NASAL) 0.65 % nasal spray; 1 spray by Nasal route as needed for Congestion.  Dispense: 45 mL;  "Refill: 3  -     fluticasone (FLONASE) 50 mcg/actuation nasal spray; 1 spray (50 mcg total) by Each Nare route once daily.  Dispense: 1 Bottle; Refill: 1  -     ranitidine (ZANTAC) 300 MG tablet; Take 1 tablet (300 mg total) by mouth nightly.  Dispense: 30 tablet; Refill: 1    Nausea  -     ondansetron (ZOFRAN) 4 MG tablet; Take 1 tablet (4 mg total) by mouth daily as needed for Nausea.  Dispense: 30 tablet; Refill: 1      Patient Instructions   NASAL ALLERGY    Nasal Allergy, also called "Allergic Rhinitis" occurs after exposure to pollen, molds, mildew, animal "dander" (scales from animal skin, hair and feathers), dust, smoke and fumes. (These are called "allergens"). When pollen causes a nasal allergy it is commonly called "Hay Fever".    When these particles contact the lining of the nose, eyes, eyelids, sinuses or throat, they cause the cells to release a chemical called "histamine". Histamine may cause a watery discharge from the eyes or nose. It may also cause violent sneezing, nasal congestion, itching of the eyes, nose, throat and mouth.    PREVENTION:    Nasal allergy cannot be cured but symptoms can be reduced. Avoid or reduce exposure to the allergen when possible.    HOME CARE:    1) DECONGESTANT pills and sprays (Sudafed, NeoSynephrine), reduce tissue swelling and watery discharge. Overuse of nasal decongestant sprays may make symptoms worse, ESPECIALLY IF YOU HAVE HIGH BLOOD PRESSURE. Do not use these more often than recommended. Get an over the counter Nasal Saline spray to supplement Flonase    2) ANTIHISTAMINES block the release of histamine during the allergic response. Antihistamines are more effective when taken BEFORE symptoms develop. Unless a prescription antihistamine was prescribed, you may take CLARITIN (loratadine). (Claritin is an over-the-counter antihistamine that does not cause drowsiness.)    3) STEROID nasal sprays (Beconase, Vancenase, Nasalide, Nasocort, Flonase) or oral steroids " (Prednisone) may also be prescribed for more severe symptoms. These help to reduce the local inflammation which adds to the allergic response.    4) If you have ASTHMA, pollen season may make your asthma symptoms worse. It is important that you use your asthma medicines as directed during this time to prevent or treat attacks. Some persons with asthma have a worsening of their asthma symptoms when taking antihistamines. If you notice this, stop the antihistamines and notify your doctor.        You received a steroid shot today - this can elevate your blood pressure, elevate your blood sugar, water weight gain, nervous energy, redness to the face and dimpling of the skin where the shot goes in.     If you were prescribed a narcotic medication, do not drive or operate heavy equipment or machinery while taking these medications.  You must understand that you've received an Urgent Care treatment only and that you may be released before all your medical problems are known or treated. You, the patient, will arrange for follow up care as instructed.  Follow up with your PCP or specialty clinic as directed in the next 1-2 weeks if not improved or as needed.  You can call [phone] to schedule an appointment with the appropriate provider.  If your condition worsens we recommend that you receive another evaluation at the emergency room immediately or contact your primary medical clinics after hours call service to discuss your concerns.  Please return here or go to the Emergency Department for any concerns or worsening of condition.

## 2018-07-31 NOTE — PATIENT INSTRUCTIONS
"NASAL ALLERGY    Nasal Allergy, also called "Allergic Rhinitis" occurs after exposure to pollen, molds, mildew, animal "dander" (scales from animal skin, hair and feathers), dust, smoke and fumes. (These are called "allergens"). When pollen causes a nasal allergy it is commonly called "Hay Fever".    When these particles contact the lining of the nose, eyes, eyelids, sinuses or throat, they cause the cells to release a chemical called "histamine". Histamine may cause a watery discharge from the eyes or nose. It may also cause violent sneezing, nasal congestion, itching of the eyes, nose, throat and mouth.    PREVENTION:    Nasal allergy cannot be cured but symptoms can be reduced. Avoid or reduce exposure to the allergen when possible.    HOME CARE:    1) DECONGESTANT pills and sprays (Sudafed, NeoSynephrine), reduce tissue swelling and watery discharge. Overuse of nasal decongestant sprays may make symptoms worse, ESPECIALLY IF YOU HAVE HIGH BLOOD PRESSURE. Do not use these more often than recommended. Get an over the counter Nasal Saline spray to supplement Flonase    2) ANTIHISTAMINES block the release of histamine during the allergic response. Antihistamines are more effective when taken BEFORE symptoms develop. Unless a prescription antihistamine was prescribed, you may take CLARITIN (loratadine). (Claritin is an over-the-counter antihistamine that does not cause drowsiness.)    3) STEROID nasal sprays (Beconase, Vancenase, Nasalide, Nasocort, Flonase) or oral steroids (Prednisone) may also be prescribed for more severe symptoms. These help to reduce the local inflammation which adds to the allergic response.    4) If you have ASTHMA, pollen season may make your asthma symptoms worse. It is important that you use your asthma medicines as directed during this time to prevent or treat attacks. Some persons with asthma have a worsening of their asthma symptoms when taking antihistamines. If you notice this, stop " the antihistamines and notify your doctor.        You received a steroid shot today - this can elevate your blood pressure, elevate your blood sugar, water weight gain, nervous energy, redness to the face and dimpling of the skin where the shot goes in.     If you were prescribed a narcotic medication, do not drive or operate heavy equipment or machinery while taking these medications.  You must understand that you've received an Urgent Care treatment only and that you may be released before all your medical problems are known or treated. You, the patient, will arrange for follow up care as instructed.  Follow up with your PCP or specialty clinic as directed in the next 1-2 weeks if not improved or as needed.  You can call [phone] to schedule an appointment with the appropriate provider.  If your condition worsens we recommend that you receive another evaluation at the emergency room immediately or contact your primary medical clinics after hours call service to discuss your concerns.  Please return here or go to the Emergency Department for any concerns or worsening of condition.

## 2018-07-31 NOTE — LETTER
July 31, 2018      [facility] [address], [address]  [facility] [address]  Phone: [phone]  Fax: [phone]       Patient: Babar Casper   YOB: 1995  Date of Visit: 07/31/2018    To Whom It May Concern:    Sanjiv aCsper  was at [facility] on 07/31/2018.Please excuse for work/school for 5 days unless well enough to return sooner. If you have any questions or concerns, or if I can be of further assistance, please do not hesitate to contact me.    Sincerely,    Lon Hobson PA-C

## 2018-08-03 ENCOUNTER — TELEPHONE (OUTPATIENT)
Dept: URGENT CARE | Facility: CLINIC | Age: 23
End: 2018-08-03

## 2018-09-27 ENCOUNTER — OFFICE VISIT (OUTPATIENT)
Dept: URGENT CARE | Facility: CLINIC | Age: 23
End: 2018-09-27
Payer: COMMERCIAL

## 2018-09-27 VITALS
HEART RATE: 59 BPM | DIASTOLIC BLOOD PRESSURE: 64 MMHG | RESPIRATION RATE: 18 BRPM | WEIGHT: 148 LBS | OXYGEN SATURATION: 98 % | TEMPERATURE: 98 F | SYSTOLIC BLOOD PRESSURE: 118 MMHG | HEIGHT: 71 IN | BODY MASS INDEX: 20.72 KG/M2

## 2018-09-27 DIAGNOSIS — R50.9 FEVER, UNSPECIFIED FEVER CAUSE: ICD-10-CM

## 2018-09-27 DIAGNOSIS — R68.89 FLU-LIKE SYMPTOMS: ICD-10-CM

## 2018-09-27 DIAGNOSIS — J02.9 SORE THROAT: Primary | ICD-10-CM

## 2018-09-27 LAB
CTP QC/QA: YES
CTP QC/QA: YES
FLUAV AG NPH QL: NEGATIVE
FLUBV AG NPH QL: NEGATIVE
S PYO RRNA THROAT QL PROBE: NEGATIVE

## 2018-09-27 PROCEDURE — 87880 STREP A ASSAY W/OPTIC: CPT | Mod: QW,S$GLB,, | Performed by: PHYSICIAN ASSISTANT

## 2018-09-27 PROCEDURE — 3008F BODY MASS INDEX DOCD: CPT | Mod: CPTII,S$GLB,, | Performed by: PHYSICIAN ASSISTANT

## 2018-09-27 PROCEDURE — 87804 INFLUENZA ASSAY W/OPTIC: CPT | Mod: 59,QW,S$GLB, | Performed by: PHYSICIAN ASSISTANT

## 2018-09-27 PROCEDURE — 99214 OFFICE O/P EST MOD 30 MIN: CPT | Mod: 25,S$GLB,, | Performed by: PHYSICIAN ASSISTANT

## 2018-09-27 PROCEDURE — 96372 THER/PROPH/DIAG INJ SC/IM: CPT | Mod: S$GLB,,, | Performed by: FAMILY MEDICINE

## 2018-09-27 RX ORDER — BETAMETHASONE SODIUM PHOSPHATE AND BETAMETHASONE ACETATE 3; 3 MG/ML; MG/ML
6 INJECTION, SUSPENSION INTRA-ARTICULAR; INTRALESIONAL; INTRAMUSCULAR; SOFT TISSUE
Status: COMPLETED | OUTPATIENT
Start: 2018-09-27 | End: 2018-09-27

## 2018-09-27 RX ORDER — ONDANSETRON HYDROCHLORIDE 8 MG/1
8 TABLET, FILM COATED ORAL EVERY 8 HOURS PRN
Qty: 30 TABLET | Refills: 3 | Status: SHIPPED | OUTPATIENT
Start: 2018-09-27 | End: 2018-10-07

## 2018-09-27 RX ORDER — BENZONATATE 100 MG/1
100 CAPSULE ORAL 3 TIMES DAILY PRN
Qty: 30 CAPSULE | Refills: 1 | Status: SHIPPED | OUTPATIENT
Start: 2018-09-27 | End: 2019-09-27

## 2018-09-27 RX ORDER — FLUTICASONE PROPIONATE 50 MCG
1 SPRAY, SUSPENSION (ML) NASAL 2 TIMES DAILY PRN
Qty: 1 BOTTLE | Refills: 1 | Status: SHIPPED | OUTPATIENT
Start: 2018-09-27 | End: 2023-06-13

## 2018-09-27 RX ADMIN — BETAMETHASONE SODIUM PHOSPHATE AND BETAMETHASONE ACETATE 6 MG: 3; 3 INJECTION, SUSPENSION INTRA-ARTICULAR; INTRALESIONAL; INTRAMUSCULAR; SOFT TISSUE at 11:09

## 2018-09-27 NOTE — PROGRESS NOTES
"Subjective:       Patient ID: Babar Casper is a 23 y.o. male.    Vitals:  height is 5' 11" (1.803 m) and weight is 67.1 kg (148 lb). His oral temperature is 98.1 °F (36.7 °C). His blood pressure is 118/64 and his pulse is 59 (abnormal). His respiration is 18 and oxygen saturation is 98%.     Chief Complaint: Headache and Fever    Patient thinks he may have West Nile, has had achy, headaches, fever, diarrhea, nausea, sore throat.      Headache    This is a new problem. The current episode started in the past 7 days. The problem occurs constantly. The problem has been gradually improving. Associated symptoms include a fever, nausea and a sore throat. Pertinent negatives include no abdominal pain, coughing, ear pain or eye redness. Nothing aggravates the symptoms.   Fever    Associated symptoms include headaches, nausea and a sore throat. Pertinent negatives include no abdominal pain, chest pain, congestion, coughing, ear pain or wheezing.     Review of Systems   Constitution: Positive for chills, decreased appetite and fever. Negative for malaise/fatigue and night sweats.   HENT: Positive for sore throat. Negative for congestion, ear pain and hoarse voice.    Eyes: Negative for discharge and redness.   Cardiovascular: Negative for chest pain, dyspnea on exertion and leg swelling.   Respiratory: Negative for cough, shortness of breath, sputum production and wheezing.    Musculoskeletal: Negative for myalgias.   Gastrointestinal: Positive for nausea. Negative for abdominal pain.   Neurological: Positive for headaches.       Objective:      Physical Exam   Constitutional: He is oriented to person, place, and time. He appears well-developed and well-nourished. He is cooperative.  Non-toxic appearance. He does not appear ill. No distress.   HENT:   Head: Normocephalic and atraumatic.       Right Ear: Hearing, external ear and ear canal normal. No swelling or tenderness. Tympanic membrane is bulging.   Left Ear: Hearing, " external ear and ear canal normal. No swelling or tenderness. Tympanic membrane is bulging.   Nose: Nose normal. No mucosal edema, rhinorrhea or nasal deformity. No epistaxis. Right sinus exhibits no maxillary sinus tenderness and no frontal sinus tenderness. Left sinus exhibits no maxillary sinus tenderness and no frontal sinus tenderness.   Mouth/Throat: Uvula is midline, oropharynx is clear and moist and mucous membranes are normal. No trismus in the jaw. Normal dentition. No uvula swelling. No posterior oropharyngeal erythema.   Eyes: Conjunctivae and lids are normal. No scleral icterus.   Sclera clear bilat   Neck: Trachea normal, full passive range of motion without pain and phonation normal. Neck supple.   Cardiovascular: Normal rate, regular rhythm, normal heart sounds, intact distal pulses and normal pulses.   Pulmonary/Chest: Effort normal and breath sounds normal. No respiratory distress.   Abdominal: Soft. Normal appearance and bowel sounds are normal. He exhibits no distension. There is no tenderness.   Musculoskeletal: Normal range of motion. He exhibits no edema or deformity.   Neurological: He is alert and oriented to person, place, and time. He exhibits normal muscle tone. Coordination normal.   Skin: Skin is warm, dry and intact. He is not diaphoretic. No pallor.   Psychiatric: He has a normal mood and affect. His speech is normal and behavior is normal. Judgment and thought content normal. Cognition and memory are normal.   Nursing note and vitals reviewed.      Assessment:       1. Sore throat    2. Fever, unspecified fever cause    3. Flu-like symptoms        Plan:         Sore throat  -     POCT rapid strep A  -     POCT Influenza A/B    Fever, unspecified fever cause  -     POCT Influenza A/B    Flu-like symptoms  -     betamethasone acetate-betamethasone sodium phosphate injection 6 mg; Inject 1 mL (6 mg total) into the muscle one time.  -     benzonatate (TESSALON PERLES) 100 MG capsule;  Take 1 capsule (100 mg total) by mouth 3 (three) times daily as needed for Cough.  Dispense: 30 capsule; Refill: 1  -     fluticasone (FLONASE) 50 mcg/actuation nasal spray; 1 spray (50 mcg total) by Each Nare route 2 (two) times daily as needed for Rhinitis or Allergies.  Dispense: 1 Bottle; Refill: 1  -     sodium chloride (OCEAN NASAL) 0.65 % nasal spray; 1 spray by Nasal route as needed for Congestion.  Dispense: 45 mL; Refill: 3  -     ondansetron (ZOFRAN) 8 MG tablet; Take 1 tablet (8 mg total) by mouth every 8 (eight) hours as needed for Nausea.  Dispense: 30 tablet; Refill: 3      Results for orders placed or performed in visit on 09/27/18   POCT rapid strep A   Result Value Ref Range    Rapid Strep A Screen Negative Negative     Acceptable Yes    POCT Influenza A/B   Result Value Ref Range    Rapid Influenza A Ag Negative Negative    Rapid Influenza B Ag Negative Negative     Acceptable Yes         Patient Instructions   Symptomatic treatment:    Alternate Tylenol and Ibuprofen every 3 hrs  salt water gargles to soothe throat  Honey/lemon water to soothe throat  Cold-eeze helps to reduce the duration of URI symptoms  Elderberry to reduce duration of URI symptoms  Cepachol helps to numb the discomfort in throat  Nasal saline spray reduces inflammation and dryness  Warm face compresses/hot showers as often as you can to open sinuses   Vicks vapor rub at night  Flonase OTC or Nasacort OTC  Simple foods like chicken noodle soup help hydrate  Delsym helps with coughing at night  Zyrtec/Claritin during the day and Benadryl at night may help if allergy component   Zantac will help if there is reflux from the post nasal drip  Rest as much as you can  Your symptoms will likely last 5-7 days, maybe longer depending on how it affects your body.  You are contagious 5-7, so minimize contact with others to reduce the spread to others. Dehydration is preventable but is one of the main reasons  why you will feel so badly. Drink pedialyte, gatorade or propel. Stay hydrated.  Antibiotics are not needed unless a complication(such as Otitis Media, Bacterial sinus infection or pneumonia). Taking antibiotics for Flu/Cold is not supported by evidence-based medicine and can expose you to unnecessary side effects of the medication, such as anaphylaxis.   If you experience any:  Chest pain, shortness of breath, wheezing or difficulty breathing  Severe headache, face, neck or ear pain  New rash  Fever over 101.5º F (38.6 C) for more than three days  Confusion, behavior change or seizure  Severe weakness or dizziness  Go to ER       You received a steroid shot today - this can elevate your blood pressure, elevate your blood sugar, water weight gain, nervous energy, redness to the face and dimpling of the skin where the shot goes in.      If you were prescribed a narcotic medication, do not drive or operate heavy equipment or machinery while taking these medications.  You must understand that you've received an Urgent Care treatment only and that you may be released before all your medical problems are known or treated. You, the patient, will arrange for follow up care as instructed.  Follow up with your PCP or specialty clinic as directed in the next 1-2 weeks if not improved or as needed.  You can call [phone] to schedule an appointment with the appropriate provider.  If your condition worsens we recommend that you receive another evaluation at the emergency room immediately or contact your primary medical clinics after hours call service to discuss your concerns.  Please return here or go to the Emergency Department for any concerns or worsening of condition.

## 2018-09-27 NOTE — PATIENT INSTRUCTIONS
Symptomatic treatment:    Alternate Tylenol and Ibuprofen every 3 hrs  salt water gargles to soothe throat  Honey/lemon water to soothe throat  Cold-eeze helps to reduce the duration of URI symptoms  Elderberry to reduce duration of URI symptoms  Cepachol helps to numb the discomfort in throat  Nasal saline spray reduces inflammation and dryness  Warm face compresses/hot showers as often as you can to open sinuses   Vicks vapor rub at night  Flonase OTC or Nasacort OTC  Simple foods like chicken noodle soup help hydrate  Delsym helps with coughing at night  Zyrtec/Claritin during the day and Benadryl at night may help if allergy component   Zantac will help if there is reflux from the post nasal drip  Rest as much as you can  Your symptoms will likely last 5-7 days, maybe longer depending on how it affects your body.  You are contagious 5-7, so minimize contact with others to reduce the spread to others. Dehydration is preventable but is one of the main reasons why you will feel so badly. Drink pedialyte, gatorade or propel. Stay hydrated.  Antibiotics are not needed unless a complication(such as Otitis Media, Bacterial sinus infection or pneumonia). Taking antibiotics for Flu/Cold is not supported by evidence-based medicine and can expose you to unnecessary side effects of the medication, such as anaphylaxis.   If you experience any:  Chest pain, shortness of breath, wheezing or difficulty breathing  Severe headache, face, neck or ear pain  New rash  Fever over 101.5º F (38.6 C) for more than three days  Confusion, behavior change or seizure  Severe weakness or dizziness  Go to ER       You received a steroid shot today - this can elevate your blood pressure, elevate your blood sugar, water weight gain, nervous energy, redness to the face and dimpling of the skin where the shot goes in.      If you were prescribed a narcotic medication, do not drive or operate heavy equipment or machinery while taking these  medications.  You must understand that you've received an Urgent Care treatment only and that you may be released before all your medical problems are known or treated. You, the patient, will arrange for follow up care as instructed.  Follow up with your PCP or specialty clinic as directed in the next 1-2 weeks if not improved or as needed.  You can call [phone] to schedule an appointment with the appropriate provider.  If your condition worsens we recommend that you receive another evaluation at the emergency room immediately or contact your primary medical clinics after hours call service to discuss your concerns.  Please return here or go to the Emergency Department for any concerns or worsening of condition.

## 2018-09-27 NOTE — LETTER
September 27, 2018      [facility] [address], [address]  [facility] [address]  Phone: [phone]  Fax: [phone]       Patient: Babar Casper   YOB: 1995  Date of Visit: 09/27/2018    To Whom It May Concern:    Sanjiv Casper  was at [facility] on 09/27/2018.Please excuse for work/school for 2 days unless well enough to return sooner. If you have any questions or concerns, or if I can be of further assistance, please do not hesitate to contact me.    Sincerely,    Lon Hobson PA-C

## 2019-12-11 ENCOUNTER — OFFICE VISIT (OUTPATIENT)
Dept: URGENT CARE | Facility: CLINIC | Age: 24
End: 2019-12-11
Payer: COMMERCIAL

## 2019-12-11 VITALS
SYSTOLIC BLOOD PRESSURE: 118 MMHG | HEIGHT: 71 IN | BODY MASS INDEX: 20.72 KG/M2 | WEIGHT: 148 LBS | HEART RATE: 97 BPM | DIASTOLIC BLOOD PRESSURE: 74 MMHG | TEMPERATURE: 99 F | OXYGEN SATURATION: 100 % | RESPIRATION RATE: 16 BRPM
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
## 2019-12-11 DIAGNOSIS — S61.432A PUNCTURE WOUND OF LEFT HAND WITHOUT FOREIGN BODY, INITIAL ENCOUNTER: ICD-10-CM

## 2019-12-11 PROCEDURE — 99214 PR OFFICE/OUTPT VISIT, EST, LEVL IV, 30-39 MIN: ICD-10-PCS | Mod: S$GLB,,, | Performed by: PHYSICIAN ASSISTANT

## 2019-12-11 PROCEDURE — 99214 OFFICE O/P EST MOD 30 MIN: CPT | Mod: S$GLB,,, | Performed by: PHYSICIAN ASSISTANT

## 2019-12-11 PROCEDURE — 80053 COMPREHEN METABOLIC PANEL: CPT | Mod: QW,S$GLB,, | Performed by: PHYSICIAN ASSISTANT

## 2019-12-11 PROCEDURE — 80053 COMPREHENSIVE METABOLIC PANEL: ICD-10-PCS | Mod: QW,S$GLB,, | Performed by: PHYSICIAN ASSISTANT

## 2019-12-11 NOTE — LETTER
[facility] [address], [address]  [facility] [address]  Phone: [phone]  Fax: [phone]  [facility]: [phone]    Pt Name: Babar Casper  Injury Date: 12/11/2019   Employee ID:  Date of First Treatment: 12/11/2019   Company: [employer] to [employer]       Appointment Time: 06:20 PM Arrived: 6:35PM   Provider: Althea Shen PA-C Time Out:7:25PM     Office Treatment:   1. Work related injury    2. Puncture wound of left hand without foreign body, initial encounter                     Return Appointment:  [facility] ([facility]) M-F 8-4:30 PM as needed.  Return to work full-duty without restrictions.

## 2019-12-12 ENCOUNTER — TELEPHONE (OUTPATIENT)
Dept: URGENT CARE | Facility: CLINIC | Age: 24
End: 2019-12-12

## 2019-12-12 NOTE — PATIENT INSTRUCTIONS
General Referral to [facility]  You were referred to [facility] for Follow-up Care.    Please go to the Emergency Department for any concerns or worsening of condition.  Please follow up with your primary care doctor or specialist in the next 48-72hrs as needed.    If you smoke, please stop smoking.   Patient aware, verbalized understanding and agreed with plan of care.    Drink plenty of fluids and get lots of rest.  Avoid picking/manipulating the wound.   DO NOT GET WET FOR AT LEAST 24 HOURS.  After 24 hours, please clean with regular soap and water and then cover with a bandage.  Cover during the day to avoid friction constantly rubbing up against the area of irritation.  Follow-up with your Occupational Health for further evaluation as needed.  You should seek ER treatment if fever, increased pain, swelling, red streaks from affected area or other signs of increasing infection.   ER precautions given to patient.  Patient aware and verbalized understanding.    Puncture Wound       A puncture wound occurs when a pointed object pushes into the skin. It may go into the tissues below the skin, including fat and muscle. This type of wound is narrow and deep and can be hard to clean. Because of this, puncture wounds are at high risk for becoming infected.  X-rays may be done to check the wound for objects stuck under the skin. Your may also need a tetanus shot. This is given if you are not up to date on this vaccination and the object that caused the wound may lead to tetanus.  Home care  · Your healthcare provider may prescribe an antibiotic. This is to help prevent infection. Follow all instructions for taking this medicine. Take the medicine every day until it is gone or you are told to stop. You should not have any left over.  · The healthcare provider may prescribe medicines for pain. Follow instructions for taking them.  · You can take acetaminophen or ibuprofen for pain,  unless you were given a different pain medicine to use.   · Follow the healthcare providers instructions on how to care for the wound.  · Keep the wound clean and dry. Do not get the wound wet until you are told it is okay to do so. If the area gets wet, gently pat it dry with a clean cloth. Replace the wet bandage with a dry one.  · If a bandage was applied and it becomes wet or dirty, replace it. Otherwise, leave it in place for the first 24 hours.  · Once you can get the wound wet, you may shower as usual but do not soak the wound in water (no tub baths or swimming)  · Even with proper treatment, a puncture wound may become infected. Check the wound daily for signs of infection listed below.  Follow-up care  Follow up with your healthcare provider as advised.   When to seek medical advice  Call your healthcare provider right away if any of these occur:  · Signs of infection, including:  ¨ Increasing redness or swelling around the wound  ¨ Increased warmth of the wound  ¨ Worsening pain  ¨ Red streaking lines away from the wound  ¨ Draining pus  · Fever of 100.4°F (38.ºC) or higher or as directed by your healthcare provider  · Wound changes colors  · Numbness around the wound  · Decreased movement around the injured area  Date Last Reviewed: 8/24/2015 © 2000-2017 [employer]. [address] [place] [address], [place] [address]. All rights reserved. This information is not intended as a substitute for professional medical care. Always follow your healthcare professional's instructions.

## 2019-12-12 NOTE — PROGRESS NOTES
Subjective:       Patient ID: Babar Casper is a 24 y.o. male.    Chief Complaint: Hand Injury    Patient works for [employer]. Patient states that he was bit by a monkey at about 6pm tonight.  Patient states that he scrubbed with regular soap and water about 15 minutes prior to arrival. [facility]'s on-call nurse, Yaneth, called urgent care to let us know that [facility] was already called in for patient. Per Yaneth, patient needs to have CBC and CMP done today in clinic. Patient currently denies numbness/tingling or weakness. Patient is right-hand dominant.     Hand Injury    His dominant hand is their right hand. The incident occurred less than 1 hour ago. The incident occurred at work. There was no injury mechanism. The pain is present in the left hand. The pain does not radiate. The pain is at a severity of 0/10. The patient is experiencing no pain. Pertinent negatives include no chest pain, muscle weakness, numbness or tingling. Nothing aggravates the symptoms. He has tried nothing for the symptoms.       Constitution: Negative for chills, sweating, fatigue and fever.   HENT: Negative for ear pain, congestion, sore throat, trouble swallowing and voice change.    Neck: Negative for neck pain, neck stiffness, painful lymph nodes and neck swelling.   Cardiovascular: Negative for chest pain, leg swelling, palpitations, sob on exertion and passing out.   Eyes: Negative for eye pain, eye redness, photophobia, double vision, blurred vision and eyelid swelling.   Respiratory: Negative for chest tightness, cough, sputum production, bloody sputum, shortness of breath and stridor.    Gastrointestinal: Negative for abdominal pain, abdominal bloating, nausea, vomiting, constipation, diarrhea and heartburn.   Genitourinary: Negative for dysuria, frequency and urgency.   Musculoskeletal: Positive for pain and trauma. Negative for joint pain, joint swelling, abnormal ROM of joint, back pain, pain with  walking, muscle cramps and muscle ache.   Skin: Negative for color change, pale, rash, erythema and hives.   Allergic/Immunologic: Negative for seasonal allergies, hives, itching and sneezing.   Neurological: Negative for dizziness, light-headedness, passing out, facial drooping, speech difficulty, loss of balance, headaches, altered mental status, loss of consciousness, numbness, tingling and seizures.   Hematologic/Lymphatic: Negative for swollen lymph nodes, easy bruising/bleeding and history of blood clots. Does not bruise/bleed easily.   Psychiatric/Behavioral: Negative for altered mental status, nervous/anxious, sleep disturbance and depression. The patient is not nervous/anxious.         Objective:      Physical Exam   Constitutional: He is oriented to person, place, and time. He appears well-developed and well-nourished.  Non-toxic appearance. He does not appear ill. No distress.   Patient is stable, seated comfortably in NAD.   HENT:   Head: Normocephalic and atraumatic.   Right Ear: External ear normal.   Left Ear: External ear normal.   Nose: Nose normal.   Mouth/Throat: Uvula is midline and oropharynx is clear and moist. No posterior oropharyngeal erythema.   Eyes: Conjunctivae are normal.   Neck: Normal range of motion and full passive range of motion without pain. Neck supple.   Cardiovascular: Normal rate, regular rhythm, normal heart sounds and intact distal pulses.   Pulmonary/Chest: Effort normal and breath sounds normal.   Abdominal: Soft. Normal appearance and bowel sounds are normal. He exhibits no mass. There is no tenderness.   Musculoskeletal: Normal range of motion.        Right hand: Normal.        Left hand: He exhibits normal range of motion, no tenderness, no bony tenderness, normal two-point discrimination, normal capillary refill, no deformity and no swelling. Normal sensation noted. Normal strength noted.   FROM to upper and lower extremities bilateral. 5/5  strength and full  sensation bilateral. 2+ radial pulses bilateral. No numbness or tingling. Able to ambulate without difficulty.   Lymphadenopathy:     He has no cervical adenopathy.   Neurological: He is alert and oriented to person, place, and time. He has normal strength. Gait normal.   Skin: Skin is warm and dry. Capillary refill takes less than 2 seconds. Lesion noted. No abrasion, no bruising, no burn, no ecchymosis, no laceration, no petechiae and no rash noted. He is not diaphoretic. No erythema.        Nursing note and vitals reviewed.                Wound was cleaned thoroughly with Betadine and Sterile Water. Pressure dressing with Bactroban ointment, non-stick adhesive and Coban placed by MA. Will give patient at-home instructions on dressing changes and advised follow-up with Occupational Health as needed. Patient handled well without complications. Patient is seated comfortably in NAD.    Assessment:       1. Work related injury    2. Puncture wound of left hand without foreign body, initial encounter        Plan:     [facility]'s on-call nurse, Yaneth, called urgent care to let us know that Valtrex was already called in for patient. Per Yaneth, patient needs to have CBC and CMP done today in clinic. Gave patient at-home instructions on dressing changes and advised follow-up with Occupational Health as needed. Patient aware, verbalized understanding and agreed with plan of care.    Babar was seen today for hand injury.    Diagnoses and all orders for this visit:    Work related injury  -     Ambulatory referral to Occupational Medicine  -     CBC auto differential  -     Comprehensive Metabolic Panel    Puncture wound of left hand without foreign body, initial encounter      Patient Instructions   General Referral to [facility]  You were referred to [facility] for Follow-up Care.    Please go to the Emergency Department for any concerns or worsening of condition.  Please  follow up with your primary care doctor or specialist in the next 48-72hrs as needed.    If you smoke, please stop smoking.   Patient aware, verbalized understanding and agreed with plan of care.    Drink plenty of fluids and get lots of rest.  Avoid picking/manipulating the wound.   DO NOT GET WET FOR AT LEAST 24 HOURS.  After 24 hours, please clean with regular soap and water and then cover with a bandage.  Cover during the day to avoid friction constantly rubbing up against the area of irritation.  Follow-up with your Occupational Health for further evaluation as needed.  You should seek ER treatment if fever, increased pain, swelling, red streaks from affected area or other signs of increasing infection.   ER precautions given to patient.  Patient aware and verbalized understanding.    Puncture Wound       A puncture wound occurs when a pointed object pushes into the skin. It may go into the tissues below the skin, including fat and muscle. This type of wound is narrow and deep and can be hard to clean. Because of this, puncture wounds are at high risk for becoming infected.  X-rays may be done to check the wound for objects stuck under the skin. Your may also need a tetanus shot. This is given if you are not up to date on this vaccination and the object that caused the wound may lead to tetanus.  Home care  · Your healthcare provider may prescribe an antibiotic. This is to help prevent infection. Follow all instructions for taking this medicine. Take the medicine every day until it is gone or you are told to stop. You should not have any left over.  · The healthcare provider may prescribe medicines for pain. Follow instructions for taking them.  · You can take acetaminophen or ibuprofen for pain, unless you were given a different pain medicine to use.   · Follow the healthcare providers instructions on how to care for the wound.  · Keep the wound clean and dry. Do not get the wound wet until you are told it is  okay to do so. If the area gets wet, gently pat it dry with a clean cloth. Replace the wet bandage with a dry one.  · If a bandage was applied and it becomes wet or dirty, replace it. Otherwise, leave it in place for the first 24 hours.  · Once you can get the wound wet, you may shower as usual but do not soak the wound in water (no tub baths or swimming)  · Even with proper treatment, a puncture wound may become infected. Check the wound daily for signs of infection listed below.  Follow-up care  Follow up with your healthcare provider as advised.   When to seek medical advice  Call your healthcare provider right away if any of these occur:  · Signs of infection, including:  ¨ Increasing redness or swelling around the wound  ¨ Increased warmth of the wound  ¨ Worsening pain  ¨ Red streaking lines away from the wound  ¨ Draining pus  · Fever of 100.4°F (38.ºC) or higher or as directed by your healthcare provider  · Wound changes colors  · Numbness around the wound  · Decreased movement around the injured area  Date Last Reviewed: 8/24/2015  © 9806-8722 [employer]. [address] [place] [address]. All rights reserved. This information is not intended as a substitute for professional medical care. Always follow your healthcare professional's instructions.                Follow up if symptoms worsen or fail to improve, for Follow-up with PCP.

## 2019-12-13 ENCOUNTER — OFFICE VISIT (OUTPATIENT)
Dept: URGENT CARE | Facility: CLINIC | Age: 24
End: 2019-12-13
Payer: COMMERCIAL

## 2019-12-13 VITALS
TEMPERATURE: 98 F | WEIGHT: 148 LBS | OXYGEN SATURATION: 98 % | BODY MASS INDEX: 20.72 KG/M2 | RESPIRATION RATE: 14 BRPM | HEART RATE: 71 BPM | HEIGHT: 71 IN | SYSTOLIC BLOOD PRESSURE: 120 MMHG | DIASTOLIC BLOOD PRESSURE: 63 MMHG
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
## 2019-12-13 DIAGNOSIS — Z71.1 PHYSICALLY WELL BUT WORRIED: Primary | ICD-10-CM

## 2019-12-13 DIAGNOSIS — S61.452D: ICD-10-CM

## 2019-12-13 LAB
ALBUMIN SERPL-MCNC: 5 G/DL (ref 3.5–5.5)
ALBUMIN/GLOB SERPL: 2.8 {RATIO} (ref 1.2–2.2)
ALP SERPL-CCNC: 74 IU/L (ref 39–117)
ALT SERPL-CCNC: 13 IU/L (ref 0–44)
AST SERPL-CCNC: 16 IU/L (ref 0–40)
BASOPHILS # BLD AUTO: 0 X10E3/UL (ref 0–0.2)
BASOPHILS NFR BLD AUTO: 1 %
BILIRUB SERPL-MCNC: 0.2 MG/DL (ref 0–1.2)
BUN SERPL-MCNC: 13 MG/DL (ref 6–20)
BUN/CREAT SERPL: 11 (ref 9–20)
CALCIUM SERPL-MCNC: 9.5 MG/DL (ref 8.7–10.2)
CHLORIDE SERPL-SCNC: 104 MMOL/L (ref 96–106)
CO2 SERPL-SCNC: 21 MMOL/L (ref 20–29)
CREAT SERPL-MCNC: 1.23 MG/DL (ref 0.76–1.27)
EOSINOPHIL # BLD AUTO: 0.2 X10E3/UL (ref 0–0.4)
EOSINOPHIL NFR BLD AUTO: 4 %
ERYTHROCYTE [DISTWIDTH] IN BLOOD BY AUTOMATED COUNT: 13.3 % (ref 12.3–15.4)
GLOBULIN SER CALC-MCNC: 1.8 G/DL (ref 1.5–4.5)
GLUCOSE SERPL-MCNC: 92 MG/DL (ref 65–99)
HCT VFR BLD AUTO: 43.9 % (ref 37.5–51)
HGB BLD-MCNC: 13.9 G/DL (ref 13–17.7)
IMM GRANULOCYTES # BLD AUTO: 0 X10E3/UL (ref 0–0.1)
IMM GRANULOCYTES NFR BLD AUTO: 0 %
LYMPHOCYTES # BLD AUTO: 1.3 X10E3/UL (ref 0.7–3.1)
LYMPHOCYTES NFR BLD AUTO: 29 %
MCH RBC QN AUTO: 29.1 PG (ref 26.6–33)
MCHC RBC AUTO-ENTMCNC: 31.7 G/DL (ref 31.5–35.7)
MCV RBC AUTO: 92 FL (ref 79–97)
MONOCYTES # BLD AUTO: 0.3 X10E3/UL (ref 0.1–0.9)
MONOCYTES NFR BLD AUTO: 7 %
NEUTROPHILS # BLD AUTO: 2.7 X10E3/UL (ref 1.4–7)
NEUTROPHILS NFR BLD AUTO: 59 %
PLATELET # BLD AUTO: 227 X10E3/UL (ref 150–450)
POTASSIUM SERPL-SCNC: 5.1 MMOL/L (ref 3.5–5.2)
PROT SERPL-MCNC: 6.8 G/DL (ref 6–8.5)
RBC # BLD AUTO: 4.78 X10E6/UL (ref 4.14–5.8)
SODIUM SERPL-SCNC: 143 MMOL/L (ref 134–144)
WBC # BLD AUTO: 4.5 X10E3/UL (ref 3.4–10.8)

## 2019-12-13 PROCEDURE — 99214 OFFICE O/P EST MOD 30 MIN: CPT | Mod: S$GLB,,, | Performed by: FAMILY MEDICINE

## 2019-12-13 PROCEDURE — 99213 PR OFFICE/OUTPT VISIT, EST, LEVL III, 20-29 MIN: ICD-10-PCS | Mod: S$GLB,,, | Performed by: FAMILY MEDICINE

## 2019-12-13 PROCEDURE — 99213 OFFICE O/P EST LOW 20 MIN: CPT | Mod: S$GLB,,, | Performed by: FAMILY MEDICINE

## 2019-12-13 PROCEDURE — 99214 PR OFFICE/OUTPT VISIT, EST, LEVL IV, 30-39 MIN: ICD-10-PCS | Mod: S$GLB,,, | Performed by: FAMILY MEDICINE

## 2019-12-13 NOTE — LETTER
[facility] [place] [address], [address]  [facility] [address]  Phone: [phone]  Fax: [phone]  [facility]: [id number]    Pt Name: Babar Casper  Injury Date: 12/11/2019   Employee ID:  Date of First Treatment: 12/13/2019   Company: [employer]       Appointment Time: 09:45 AM Arrived:    Provider: Raj Radford MD Time Out:     Office Treatment:   1. Work related injury    2. Animal bite of palm of hand, left, subsequent encounter          Patient Instructions: Attention not to aggravate affected area    Restrictions: Regular Duty     Return Appointment: as needed

## 2019-12-13 NOTE — PROGRESS NOTES
Subjective:       Patient ID: Babar Casper is a 24 y.o. male.    Vitals:  vitals were not taken for this visit.     Chief Complaint: No chief complaint on file.    Pt would like to be checked for STD. No current sx.     Exposure to STD   The patient is experiencing no pain.       Constitution: Negative.   Gastrointestinal: Negative.    Genitourinary: Negative.    Neurological: Negative.        Objective:      Physical Exam   Constitutional: He is oriented to person, place, and time. He appears well-developed and well-nourished. He is cooperative.  Non-toxic appearance. He does not appear ill. No distress.   HENT:   Head: Normocephalic and atraumatic.   Right Ear: Hearing, tympanic membrane and ear canal normal.   Left Ear: Hearing, tympanic membrane and ear canal normal.   Nose: Nose normal. No mucosal edema, rhinorrhea or nasal deformity. No epistaxis. Right sinus exhibits no maxillary sinus tenderness and no frontal sinus tenderness. Left sinus exhibits no maxillary sinus tenderness and no frontal sinus tenderness.   Mouth/Throat: Uvula is midline and mucous membranes are normal. No trismus in the jaw. Normal dentition. No uvula swelling. No posterior oropharyngeal erythema.   Eyes: Conjunctivae and lids are normal. Right eye exhibits no discharge. Left eye exhibits no discharge. No scleral icterus.   Neck: Trachea normal, normal range of motion, full passive range of motion without pain and phonation normal. Neck supple.   Cardiovascular: Normal rate and normal pulses.   Pulmonary/Chest: Effort normal. No respiratory distress.   Abdominal: Soft. Normal appearance. He exhibits no distension, no pulsatile midline mass and no mass. There is no tenderness.   Musculoskeletal: Normal range of motion. He exhibits no edema or deformity.   Neurological: He is alert and oriented to person, place, and time. He exhibits normal muscle tone. Coordination normal.   Skin: Skin is warm, dry, intact, not diaphoretic and not  pale.   Psychiatric: He has a normal mood and affect. His speech is normal and behavior is normal. Judgment and thought content normal. Cognition and memory are normal.   Nursing note and vitals reviewed.        Assessment:       1. Physically well but worried        Plan:         Physically well but worried

## 2019-12-13 NOTE — PROGRESS NOTES
"Subjective:       Patient ID: Babar Casper is a 24 y.o. male.    Chief Complaint: Animal Bite ( follow up )    Pt is follow up for work comp injury. He was bit on hs left thumb by a monkey while at work ([employer]) on 12/11/2019. Pt states his thumb feels "just fine."    Animal Bite    The incident occurred more than 2 days ago. The incident occurred at work. There is an injury to the left thumb. Pertinent negatives include no chest pain, no numbness, no nausea, no vomiting, no headaches, no light-headedness, no tingling and no cough. There have been no prior injuries to these areas. He is right-handed. His tetanus status is UTD. He has been behaving normally.       Constitution: Negative for chills, fatigue and fever.   HENT: Negative for congestion and sore throat.    Neck: Negative for painful lymph nodes.   Cardiovascular: Negative for chest pain and leg swelling.   Eyes: Negative for double vision and blurred vision.   Respiratory: Negative for cough and shortness of breath.    Gastrointestinal: Negative for nausea, vomiting and diarrhea.   Genitourinary: Negative for dysuria, frequency and urgency.   Musculoskeletal: Negative for joint pain, joint swelling, muscle cramps and muscle ache.   Skin: Negative for color change, pale and rash.   Allergic/Immunologic: Negative for seasonal allergies.   Neurological: Negative for dizziness, history of vertigo, light-headedness, passing out, headaches and numbness.   Hematologic/Lymphatic: Negative for swollen lymph nodes, easy bruising/bleeding and history of blood clots. Does not bruise/bleed easily.   Psychiatric/Behavioral: Negative for nervous/anxious, sleep disturbance and depression. The patient is not nervous/anxious.         Objective:      Physical Exam   Constitutional: He appears well-developed and well-nourished.   HENT:   Head: Normocephalic.   Eyes: Pupils are equal, round, and reactive to light.   Neck: Normal range of motion.   Musculoskeletal: Normal " range of motion.   Neurological: He is alert.   Skin: Skin is warm and dry.   Wound well healed on left thumb-- no signs of infection or ROM deficit.    Nursing note and vitals reviewed.      Assessment:       1. Work related injury    2. Animal bite of palm of hand, left, subsequent encounter        Plan:            Patient Instructions: Attention not to aggravate affected area   Restrictions: Regular Duty  No follow-ups on file.

## 2019-12-14 LAB
HAV IGM SERPL QL IA: NEGATIVE
HBV CORE IGM SERPL QL IA: NEGATIVE
HBV SURFACE AG SERPL QL IA: NEGATIVE
HCV AB S/CO SERPL IA: <0.1 S/CO RATIO (ref 0–0.9)
HIV 1+2 AB+HIV1 P24 AG SERPL QL IA: NON REACTIVE
RPR SER QL: NORMAL

## 2019-12-17 LAB
C TRACH RRNA SPEC QL NAA+PROBE: NEGATIVE
N GONORRHOEA RRNA SPEC QL NAA+PROBE: NEGATIVE

## 2019-12-24 ENCOUNTER — TELEPHONE (OUTPATIENT)
Dept: URGENT CARE | Facility: CLINIC | Age: 24
End: 2019-12-24

## 2019-12-24 NOTE — TELEPHONE ENCOUNTER
Pt notified     ----- Message from Raj Radford MD sent at 12/19/2019  9:26 AM CST -----  Please call the patient regarding his normal result. Find out why RPR cancelled

## 2020-01-31 ENCOUNTER — OFFICE VISIT (OUTPATIENT)
Dept: URGENT CARE | Facility: CLINIC | Age: 25
End: 2020-01-31
Payer: COMMERCIAL

## 2020-01-31 VITALS
HEIGHT: 71 IN | SYSTOLIC BLOOD PRESSURE: 132 MMHG | OXYGEN SATURATION: 98 % | RESPIRATION RATE: 18 BRPM | BODY MASS INDEX: 20.72 KG/M2 | DIASTOLIC BLOOD PRESSURE: 76 MMHG | HEART RATE: 62 BPM | WEIGHT: 148 LBS | TEMPERATURE: 99 F

## 2020-01-31 DIAGNOSIS — J10.1 INFLUENZA A: ICD-10-CM

## 2020-01-31 DIAGNOSIS — R68.89 FLU-LIKE SYMPTOMS: Primary | ICD-10-CM

## 2020-01-31 LAB
CTP QC/QA: YES
FLUAV AG NPH QL: POSITIVE
FLUBV AG NPH QL: NEGATIVE

## 2020-01-31 PROCEDURE — 87804 POCT INFLUENZA A/B: ICD-10-PCS | Mod: QW,S$GLB,, | Performed by: FAMILY MEDICINE

## 2020-01-31 PROCEDURE — 99214 PR OFFICE/OUTPT VISIT, EST, LEVL IV, 30-39 MIN: ICD-10-PCS | Mod: 25,S$GLB,, | Performed by: FAMILY MEDICINE

## 2020-01-31 PROCEDURE — 87804 INFLUENZA ASSAY W/OPTIC: CPT | Mod: QW,S$GLB,, | Performed by: FAMILY MEDICINE

## 2020-01-31 PROCEDURE — 99214 OFFICE O/P EST MOD 30 MIN: CPT | Mod: 25,S$GLB,, | Performed by: FAMILY MEDICINE

## 2020-01-31 NOTE — PROGRESS NOTES
"Subjective:       Patient ID: Babar Casper is a 24 y.o. male.    Vitals:  height is 5' 11" (1.803 m) and weight is 67.1 kg (148 lb). His oral temperature is 99.1 °F (37.3 °C). His blood pressure is 132/76 and his pulse is 62. His respiration is 18 and oxygen saturation is 98%.     Chief Complaint: Sore Throat; Cough; Nasal Congestion; and Generalized Body Aches    Pt presents today with sore/scratchy throat, cough, body aches, headache X's 2-3 days. Pt is herbal supplements with no relief.     Sore Throat    This is a new problem. The current episode started in the past 7 days. The problem has been unchanged. There has been no fever. The fever has been present for less than 1 day. The pain is at a severity of 6/10. The pain is moderate. Associated symptoms include congestion, coughing and headaches. Pertinent negatives include no abdominal pain, diarrhea, drooling, ear discharge, ear pain, hoarse voice, plugged ear sensation, neck pain, shortness of breath, stridor, swollen glands, trouble swallowing or vomiting. He has had no exposure to strep or mono. Treatments tried: herbal supplements. The treatment provided no relief.   Cough   Associated symptoms include headaches, postnasal drip and a sore throat. Pertinent negatives include no chills, ear pain, eye redness, fever, hemoptysis, myalgias, rash, shortness of breath or wheezing.       Constitution: Negative for chills, sweating, fatigue and fever.   HENT: Positive for congestion, postnasal drip, sinus pain, sinus pressure and sore throat. Negative for ear pain, ear discharge, drooling, trouble swallowing and voice change.    Neck: Negative for neck pain and painful lymph nodes.   Eyes: Negative for eye redness.   Respiratory: Positive for cough. Negative for chest tightness, sputum production, bloody sputum, COPD, shortness of breath, stridor, wheezing and asthma.    Gastrointestinal: Negative for abdominal pain, nausea, vomiting and diarrhea. "   Musculoskeletal: Negative for muscle ache.   Skin: Negative for rash.   Allergic/Immunologic: Negative for seasonal allergies and asthma.   Neurological: Positive for headaches.   Hematologic/Lymphatic: Negative for swollen lymph nodes.       Objective:      Physical Exam   Constitutional: He is oriented to person, place, and time. He appears well-developed and well-nourished. He is cooperative.  Non-toxic appearance. He does not have a sickly appearance. He appears ill. No distress.   HENT:   Head: Normocephalic and atraumatic.   Right Ear: Hearing, tympanic membrane, external ear and ear canal normal.   Left Ear: Hearing, tympanic membrane, external ear and ear canal normal.   Nose: Nose normal. No mucosal edema, rhinorrhea or nasal deformity. No epistaxis. Right sinus exhibits no maxillary sinus tenderness and no frontal sinus tenderness. Left sinus exhibits no maxillary sinus tenderness and no frontal sinus tenderness.   Mouth/Throat: Uvula is midline, oropharynx is clear and moist and mucous membranes are normal. No trismus in the jaw. Normal dentition. No uvula swelling. No oropharyngeal exudate, posterior oropharyngeal edema or posterior oropharyngeal erythema.   Eyes: Conjunctivae and lids are normal. No scleral icterus.   Neck: Trachea normal, full passive range of motion without pain and phonation normal. Neck supple. No neck rigidity. No edema and no erythema present.   Cardiovascular: Normal rate, regular rhythm, normal heart sounds, intact distal pulses and normal pulses.   Pulmonary/Chest: Effort normal and breath sounds normal. No respiratory distress. He has no decreased breath sounds. He has no rhonchi.   Abdominal: Normal appearance.   Musculoskeletal: Normal range of motion. He exhibits no edema or deformity.   Neurological: He is alert and oriented to person, place, and time. He exhibits normal muscle tone. Coordination normal.   Skin: Skin is warm, dry, intact, not diaphoretic and not pale.    Psychiatric: He has a normal mood and affect. His speech is normal and behavior is normal. Judgment and thought content normal. Cognition and memory are normal.   Nursing note and vitals reviewed.        Assessment:       1. Flu-like symptoms    2. Influenza A        Plan:         Flu-like symptoms  -     POCT Influenza A/B    Influenza A    Other orders  -     baloxavir marboxiL (XOFLUZA) 20 mg tablet; Take 2 tablets (40 mg total) by mouth once. for 1 dose  Dispense: 2 tablet; Refill: 0      Results for orders placed or performed in visit on 01/31/20   POCT Influenza A/B   Result Value Ref Range    Rapid Influenza A Ag Positive (A) Negative    Rapid Influenza B Ag Negative Negative     Acceptable Yes

## 2020-02-27 ENCOUNTER — OFFICE VISIT (OUTPATIENT)
Dept: URGENT CARE | Facility: CLINIC | Age: 25
End: 2020-02-27
Payer: COMMERCIAL

## 2020-02-27 VITALS
DIASTOLIC BLOOD PRESSURE: 69 MMHG | HEIGHT: 71 IN | OXYGEN SATURATION: 100 % | TEMPERATURE: 98 F | SYSTOLIC BLOOD PRESSURE: 129 MMHG | WEIGHT: 148 LBS | RESPIRATION RATE: 16 BRPM | HEART RATE: 74 BPM | BODY MASS INDEX: 20.72 KG/M2

## 2020-02-27 DIAGNOSIS — T14.8XXA ANIMAL BITE: Primary | ICD-10-CM

## 2020-02-27 PROCEDURE — 80053 COMPREHENSIVE METABOLIC PANEL: ICD-10-PCS | Mod: QW,S$GLB,, | Performed by: PHYSICIAN ASSISTANT

## 2020-02-27 PROCEDURE — 80053 COMPREHEN METABOLIC PANEL: CPT | Mod: QW,S$GLB,, | Performed by: PHYSICIAN ASSISTANT

## 2020-02-27 PROCEDURE — 85025 COMPLETE CBC W/AUTO DIFF WBC: CPT | Mod: QW,S$GLB,, | Performed by: PHYSICIAN ASSISTANT

## 2020-02-27 PROCEDURE — 85025 CBC W/ AUTO DIFFERENTIAL: ICD-10-PCS | Mod: QW,S$GLB,, | Performed by: PHYSICIAN ASSISTANT

## 2020-02-27 PROCEDURE — 99203 PR OFFICE/OUTPT VISIT, NEW, LEVL III, 30-44 MIN: ICD-10-PCS | Mod: 25,S$GLB,, | Performed by: PHYSICIAN ASSISTANT

## 2020-02-27 PROCEDURE — 99203 OFFICE O/P NEW LOW 30 MIN: CPT | Mod: 25,S$GLB,, | Performed by: PHYSICIAN ASSISTANT

## 2020-02-27 NOTE — LETTER
[facility] [place] [address], [address]  [facility] [address]  Phone: [phone]  Fax: [phone]  [facility]: [email or web address]    Pt Name: Babar Casper  Injury Date: 02/27/2020   Employee ID:  Date of First Treatment: 02/27/2020   Company: [employer]      Appointment Time: 05:55 PM Arrived: 6:10   Provider: LEYLA Crandall Time Out: 7:20     Office Treatment:   1. Animal bite          Patient Instructions: Attention not to aggravate affected area    Restrictions: Regular Duty     Return Appointment: prn

## 2020-02-28 NOTE — PROGRESS NOTES
Today @ 3:00pm. Pt stated that he's not in any pain. Pt's pain scale today is 0/10, just feels like a scrape.

## 2020-02-28 NOTE — PROGRESS NOTES
Subjective:       Patient ID: Babar Casper is a 24 y.o. male.    Chief Complaint: Animal Bite    Pt present today with a W/C Injury to the Lower back.. Pt works for [employer] [place], Pt stated that he was sitting in front of the Monkey Cage getting ready to open the gate and the Monkey tried to flee the cage. Pt caught the Monkey under his arm and the Monkey bite Pt on his lower Rt side of his back. This event happen     ROS     Objective:      Physical Exam   Constitutional: He is oriented to person, place, and time. He appears well-developed and well-nourished. No distress.   HENT:   Head: Normocephalic and atraumatic.   Right Ear: External ear normal.   Left Ear: External ear normal.   Nose: Nose normal.   Eyes: Conjunctivae, EOM and lids are normal.   Neck: Trachea normal, full passive range of motion without pain and phonation normal. Neck supple.   Musculoskeletal: Normal range of motion.   Neurological: He is alert and oriented to person, place, and time.   Skin: Skin is warm, dry and intact. He is not diaphoretic. No pallor.   Small, scratch approx 1mm long to lower right back   Psychiatric: He has a normal mood and affect. His speech is normal and behavior is normal. Judgment and thought content normal. Cognition and memory are normal.   Nursing note and vitals reviewed.      Assessment:       1. Animal bite        Plan:       [facility] To order CBC and CMP. Yaneth has called Valtrex and pharmacy.  Patient needed to leave in the middle of appointment to get his 1st dose of Valtrex to meet within 4th hour start.  Confirmed with patient that he does not work with [employer]. Wound cleansed while in clinic.     Patient Instructions: Attention not to aggravate affected area   Restrictions: Regular Duty  Follow up if symptoms worsen or fail to improve.

## 2020-02-29 LAB
ALBUMIN SERPL-MCNC: 5.2 G/DL (ref 4.1–5.2)
ALBUMIN/GLOB SERPL: 2.5 {RATIO} (ref 1.2–2.2)
ALP SERPL-CCNC: 77 IU/L (ref 39–117)
ALT SERPL-CCNC: 13 IU/L (ref 0–44)
AST SERPL-CCNC: 17 IU/L (ref 0–40)
BASOPHILS # BLD AUTO: 0 X10E3/UL (ref 0–0.2)
BASOPHILS NFR BLD AUTO: 1 %
BILIRUB SERPL-MCNC: 0.3 MG/DL (ref 0–1.2)
BUN SERPL-MCNC: 16 MG/DL (ref 6–20)
BUN/CREAT SERPL: 13 (ref 9–20)
CALCIUM SERPL-MCNC: 9.6 MG/DL (ref 8.7–10.2)
CHLORIDE SERPL-SCNC: 98 MMOL/L (ref 96–106)
CO2 SERPL-SCNC: 25 MMOL/L (ref 20–29)
CREAT SERPL-MCNC: 1.24 MG/DL (ref 0.76–1.27)
EOSINOPHIL # BLD AUTO: 0.1 X10E3/UL (ref 0–0.4)
EOSINOPHIL NFR BLD AUTO: 2 %
ERYTHROCYTE [DISTWIDTH] IN BLOOD BY AUTOMATED COUNT: 13.6 % (ref 11.6–15.4)
GLOBULIN SER CALC-MCNC: 2.1 G/DL (ref 1.5–4.5)
GLUCOSE SERPL-MCNC: ABNORMAL MG/DL
HCT VFR BLD AUTO: 46.3 % (ref 37.5–51)
HGB BLD-MCNC: 14.9 G/DL (ref 13–17.7)
IMM GRANULOCYTES # BLD AUTO: 0 X10E3/UL (ref 0–0.1)
IMM GRANULOCYTES NFR BLD AUTO: 0 %
LYMPHOCYTES # BLD AUTO: 1.5 X10E3/UL (ref 0.7–3.1)
LYMPHOCYTES NFR BLD AUTO: 28 %
MCH RBC QN AUTO: 29.8 PG (ref 26.6–33)
MCHC RBC AUTO-ENTMCNC: 32.2 G/DL (ref 31.5–35.7)
MCV RBC AUTO: 93 FL (ref 79–97)
MONOCYTES # BLD AUTO: 0.4 X10E3/UL (ref 0.1–0.9)
MONOCYTES NFR BLD AUTO: 7 %
NEUTROPHILS # BLD AUTO: 3.3 X10E3/UL (ref 1.4–7)
NEUTROPHILS NFR BLD AUTO: 62 %
PLATELET # BLD AUTO: 197 X10E3/UL (ref 150–450)
POTASSIUM SERPL-SCNC: ABNORMAL MMOL/L
PROT SERPL-MCNC: 7.3 G/DL (ref 6–8.5)
RBC # BLD AUTO: 5 X10E6/UL (ref 4.14–5.8)
SODIUM SERPL-SCNC: 142 MMOL/L (ref 134–144)
WBC # BLD AUTO: 5.3 X10E3/UL (ref 3.4–10.8)

## 2020-10-01 ENCOUNTER — OFFICE VISIT (OUTPATIENT)
Dept: URGENT CARE | Facility: CLINIC | Age: 25
End: 2020-10-01
Payer: COMMERCIAL

## 2020-10-01 VITALS
WEIGHT: 148 LBS | SYSTOLIC BLOOD PRESSURE: 144 MMHG | HEIGHT: 71 IN | OXYGEN SATURATION: 100 % | TEMPERATURE: 97 F | HEART RATE: 57 BPM | BODY MASS INDEX: 20.72 KG/M2 | DIASTOLIC BLOOD PRESSURE: 84 MMHG | RESPIRATION RATE: 16 BRPM

## 2020-10-01 DIAGNOSIS — B34.9 VIRAL SYNDROME: Primary | ICD-10-CM

## 2020-10-01 DIAGNOSIS — R11.0 NAUSEA: ICD-10-CM

## 2020-10-01 DIAGNOSIS — R52 BODY ACHES: ICD-10-CM

## 2020-10-01 LAB
CTP QC/QA: YES
CTP QC/QA: YES
FLUAV AG NPH QL: NEGATIVE
FLUBV AG NPH QL: NEGATIVE
SARS-COV-2 RDRP RESP QL NAA+PROBE: NEGATIVE

## 2020-10-01 PROCEDURE — 87804 INFLUENZA ASSAY W/OPTIC: CPT | Mod: QW,S$GLB,, | Performed by: PHYSICIAN ASSISTANT

## 2020-10-01 PROCEDURE — 99214 PR OFFICE/OUTPT VISIT, EST, LEVL IV, 30-39 MIN: ICD-10-PCS | Mod: 25,S$GLB,, | Performed by: PHYSICIAN ASSISTANT

## 2020-10-01 PROCEDURE — 99214 OFFICE O/P EST MOD 30 MIN: CPT | Mod: 25,S$GLB,, | Performed by: PHYSICIAN ASSISTANT

## 2020-10-01 PROCEDURE — 87804 POCT INFLUENZA A/B: ICD-10-PCS | Mod: 59,QW,S$GLB, | Performed by: PHYSICIAN ASSISTANT

## 2020-10-01 PROCEDURE — U0002: ICD-10-PCS | Mod: QW,S$GLB,, | Performed by: PHYSICIAN ASSISTANT

## 2020-10-01 PROCEDURE — U0002 COVID-19 LAB TEST NON-CDC: HCPCS | Mod: QW,S$GLB,, | Performed by: PHYSICIAN ASSISTANT

## 2020-10-01 RX ORDER — ONDANSETRON 4 MG/1
4 TABLET, FILM COATED ORAL EVERY 8 HOURS PRN
Qty: 30 TABLET | Refills: 0 | Status: SHIPPED | OUTPATIENT
Start: 2020-10-01 | End: 2023-06-13

## 2020-10-01 NOTE — PATIENT INSTRUCTIONS
"  Viral Syndrome (Adult)  A viral illness may cause a number of symptoms. The symptoms depend on the part of the body that the virus affects. If it settles in your nose, throat, and lungs, it may cause cough, sore throat, congestion, and sometimes headache. If it settles in your stomach and intestinal tract, it may cause vomiting and diarrhea. Sometimes it causes vague symptoms like "aching all over," feeling tired, loss of appetite, or fever.  A viral illness usually lasts 1 to 2 weeks, but sometimes it lasts longer. In some cases, a more serious infection can look like a viral syndrome in the first few days of the illness. You may need another exam and additional tests to know the difference. Watch for the warning signs listed below.  Home care  Follow these guidelines for taking care of yourself at home:  · If symptoms are severe, rest at home for the first 2 to 3 days.  · Stay away from cigarette smoke - both your smoke and the smoke from others.  · You may use over-the-counter acetaminophen or ibuprofen for fever, muscle aching, and headache, unless another medicine was prescribed for this. If you have chronic liver or kidney disease or ever had a stomach ulcer or GI bleeding, talk with your doctor before using these medicines. No one who is younger than 18 and ill with a fever should take aspirin. It may cause severe disease or death.  · Your appetite may be poor, so a light diet is fine. Avoid dehydration by drinking 8 to 12 8-ounce glasses of fluids each day. This may include water; orange juice; lemonade; apple, grape, and cranberry juice; clear fruit drinks; electrolyte replacement and sports drinks; and decaffeinated teas and coffee. If you have been diagnosed with a kidney disease, ask your doctor how much and what types of fluids you should drink to prevent dehydration. If you have kidney disease, drinking too much fluid can cause it build up in the your body and be dangerous to your " health.  · Over-the-counter remedies won't shorten the length of the illness but may be helpful for cough, sore throat; and nasal and sinus congestion. Don't use decongestants if you have high blood pressure.  Follow-up care  Follow up with your healthcare provider if you do not improve over the next week.  Call 911  Get emergency medical care if any of the following occur:  · Convulsion  · Feeling weak, dizzy, or like you are going to faint  · Chest pain, shortness of breath, wheezing, or difficulty breathing  When to seek medical advice  Call your healthcare provider right away if any of these occur:  · Cough with lots of colored sputum (mucus) or blood in your sputum  · Chest pain, shortness of breath, wheezing, or difficulty breathing  · Severe headache; face, neck, or ear pain  · Severe, constant pain in the lower right side of your belly (abdominal)  · Continued vomiting (cant keep liquids down)  · Frequent diarrhea (more than 5 times a day); blood (red or black color) or mucus in diarrhea  · Feeling weak, dizzy, or like you are going to faint  · Extreme thirst  · Fever of 100.4°F (38°C) or higher, or as directed by your healthcare provider  Date Last Reviewed: 9/25/2015  © 3489-5047 [employer]. [address] [place] [address], [place] [address]. All rights reserved. This information is not intended as a substitute for professional medical care. Always follow your healthcare professional's instructions.      Please follow up with your Primary care provider within 2-5 days if your signs and symptoms have not resolved or worsen.     If your condition worsens or fails to improve we recommend that you receive another evaluation at the emergency room immediately or contact your primary medical clinic to discuss your concerns.   You must understand that you have received an Urgent Care treatment only and that you may be released before all of your medical problems are known or treated. You, the patient, will  arrange for follow up care as instructed.     RED FLAGS/WARNING SYMPTOMS DISCUSSED WITH PATIENT THAT WOULD WARRANT EMERGENT MEDICAL ATTENTION. PATIENT VERBALIZED UNDERSTANDING.

## 2020-10-01 NOTE — LETTER
October 1, 2020      [facility] [address], [address]  [facility] [address]  Phone: [phone]  Fax: [phone]       Patient: Babar Casper   YOB: 1995  Date of Visit: 10/01/2020    To Whom It May Concern:    Sanjiv Casper  was at [facility] on 10/01/2020. He may return to work/school on 10/3/2020 with no restrictions. If you have any questions or concerns, or if I can be of further assistance, please do not hesitate to contact me.    Sincerely,      Jeff Razo PA-C

## 2020-10-01 NOTE — PROGRESS NOTES
"Subjective:       Patient ID: Babar Casper is a 25 y.o. male.    Vitals:  height is 5' 11" (1.803 m) and weight is 67.1 kg (148 lb). His oral temperature is 97.4 °F (36.3 °C). His blood pressure is 144/84 (abnormal) and his pulse is 57 (abnormal). His respiration is 16 and oxygen saturation is 100%.     Chief Complaint: Headache    Pt presents to urgent care today with headaches, nausea, constant fatigue, skin sensitivity, and body aches.  Symptoms started 6 days ago.  He was last swabbed for COVID 4 days ago. He has not tried OTC medication.  He has only tried ibuprofen. Denies chest pain, SOB, or sinus congestion.    Headache   This is a new problem. The current episode started in the past 7 days. The problem occurs constantly. The problem has been unchanged. The pain does not radiate. The pain quality is similar to prior headaches. Associated symptoms include muscle aches and nausea. Pertinent negatives include no blurred vision, coughing, dizziness, fever, sore throat or vomiting. Associated symptoms comments: Fatigue, skin sensitivity. Nothing aggravates the symptoms. He has tried NSAIDs for the symptoms. The treatment provided no relief.       Constitution: Positive for fatigue. Negative for chills and fever.   HENT: Negative for congestion and sore throat.    Neck: Negative for painful lymph nodes.   Cardiovascular: Negative for chest pain and leg swelling.   Eyes: Negative for double vision and blurred vision.   Respiratory: Negative for cough and shortness of breath.    Gastrointestinal: Positive for nausea. Negative for vomiting and diarrhea.   Genitourinary: Negative for dysuria, frequency and urgency.   Musculoskeletal: Positive for muscle ache. Negative for joint pain, joint swelling and muscle cramps.   Skin: Negative for color change, pale and rash.   Allergic/Immunologic: Negative for seasonal allergies.   Neurological: Positive for headaches. Negative for dizziness, history of vertigo, " light-headedness and passing out.   Hematologic/Lymphatic: Negative for swollen lymph nodes, easy bruising/bleeding and history of blood clots. Does not bruise/bleed easily.   Psychiatric/Behavioral: Negative for nervous/anxious, sleep disturbance and depression. The patient is not nervous/anxious.        Objective:      Physical Exam   Constitutional: He is oriented to person, place, and time. He appears well-developed. He is cooperative.  Non-toxic appearance. He does not appear ill. No distress.   HENT:   Head: Normocephalic and atraumatic.   Ears:   Right Ear: Hearing, tympanic membrane, external ear and ear canal normal. Tympanic membrane is not erythematous and not bulging. No middle ear effusion. impacted cerumen  Left Ear: Hearing, tympanic membrane, external ear and ear canal normal. Tympanic membrane is not erythematous and not bulging.  No middle ear effusion. impacted cerumen  Nose: Nose normal. No mucosal edema, rhinorrhea, nasal deformity or congestion. No epistaxis. Right sinus exhibits no maxillary sinus tenderness and no frontal sinus tenderness. Left sinus exhibits no maxillary sinus tenderness and no frontal sinus tenderness.   Mouth/Throat: Uvula is midline, oropharynx is clear and moist and mucous membranes are normal. No trismus in the jaw. Normal dentition. No uvula swelling. No oropharyngeal exudate, posterior oropharyngeal edema, posterior oropharyngeal erythema, tonsillar abscesses or cobblestoning. No tonsillar exudate.   Eyes: Conjunctivae and lids are normal. Right eye exhibits no discharge. Left eye exhibits no discharge. No scleral icterus.   Neck: Trachea normal, full passive range of motion without pain and phonation normal. Neck supple. No neck rigidity. No edema and no erythema present.   Cardiovascular: Normal rate, regular rhythm, normal heart sounds and normal pulses. Exam reveals no gallop and no friction rub.   No murmur heard.  Pulmonary/Chest: Effort normal and breath sounds  normal. No stridor. No respiratory distress. He has no decreased breath sounds. He has no wheezes. He has no rhonchi. He has no rales.   Abdominal: Normal appearance.   Musculoskeletal: Normal range of motion.         General: No deformity.   Lymphadenopathy:        Head (right side): No submandibular and no tonsillar adenopathy present.        Head (left side): No submandibular and no tonsillar adenopathy present.     He has no cervical adenopathy.        Right cervical: No superficial cervical and no posterior cervical adenopathy present.       Left cervical: No superficial cervical and no posterior cervical adenopathy present.   Neurological: He is alert and oriented to person, place, and time. He exhibits normal muscle tone. Coordination normal.   Skin: Skin is warm, dry, intact, not diaphoretic and not pale. Psychiatric: His speech is normal and behavior is normal. Judgment and thought content normal.   Nursing note and vitals reviewed.        Results for orders placed or performed in visit on 10/01/20   POCT COVID-19 Rapid Screening   Result Value Ref Range    POC Rapid COVID Negative Negative     Acceptable Yes    POCT Influenza A/B   Result Value Ref Range    Rapid Influenza A Ag Negative Negative    Rapid Influenza B Ag Negative Negative     Acceptable Yes        Assessment:       1. Viral syndrome    2. Body aches    3. Nausea        Plan:     Patient was Covid-19 Rapid tested at time of visit as well as influenza and both are negative at this time. Reviewed results with patient. Discussed with patient that their symptoms are consistent with a URI at this time. Flonase should be used twice daily and take a daily Zyrtec to resolve sinus inflammation and post-nasal drip. Take benzonatate for cough suppression. Should symptoms persist or worsen you may return to clinic for evaluation or follow-up with your PCP. Patient verbalized understanding and all of their questions were  "answered.       Viral syndrome    Body aches  -     POCT COVID-19 Rapid Screening  -     POCT Influenza A/B    Nausea  -     ondansetron (ZOFRAN) 4 MG tablet; Take 1 tablet (4 mg total) by mouth every 8 (eight) hours as needed for Nausea.  Dispense: 30 tablet; Refill: 0      Patient Instructions     Viral Syndrome (Adult)  A viral illness may cause a number of symptoms. The symptoms depend on the part of the body that the virus affects. If it settles in your nose, throat, and lungs, it may cause cough, sore throat, congestion, and sometimes headache. If it settles in your stomach and intestinal tract, it may cause vomiting and diarrhea. Sometimes it causes vague symptoms like "aching all over," feeling tired, loss of appetite, or fever.  A viral illness usually lasts 1 to 2 weeks, but sometimes it lasts longer. In some cases, a more serious infection can look like a viral syndrome in the first few days of the illness. You may need another exam and additional tests to know the difference. Watch for the warning signs listed below.  Home care  Follow these guidelines for taking care of yourself at home:  · If symptoms are severe, rest at home for the first 2 to 3 days.  · Stay away from cigarette smoke - both your smoke and the smoke from others.  · You may use over-the-counter acetaminophen or ibuprofen for fever, muscle aching, and headache, unless another medicine was prescribed for this. If you have chronic liver or kidney disease or ever had a stomach ulcer or GI bleeding, talk with your doctor before using these medicines. No one who is younger than 18 and ill with a fever should take aspirin. It may cause severe disease or death.  · Your appetite may be poor, so a light diet is fine. Avoid dehydration by drinking 8 to 12 8-ounce glasses of fluids each day. This may include water; orange juice; lemonade; apple, grape, and cranberry juice; clear fruit drinks; electrolyte replacement and sports drinks; and " decaffeinated teas and coffee. If you have been diagnosed with a kidney disease, ask your doctor how much and what types of fluids you should drink to prevent dehydration. If you have kidney disease, drinking too much fluid can cause it build up in the your body and be dangerous to your health.  · Over-the-counter remedies won't shorten the length of the illness but may be helpful for cough, sore throat; and nasal and sinus congestion. Don't use decongestants if you have high blood pressure.  Follow-up care  Follow up with your healthcare provider if you do not improve over the next week.  Call 911  Get emergency medical care if any of the following occur:  · Convulsion  · Feeling weak, dizzy, or like you are going to faint  · Chest pain, shortness of breath, wheezing, or difficulty breathing  When to seek medical advice  Call your healthcare provider right away if any of these occur:  · Cough with lots of colored sputum (mucus) or blood in your sputum  · Chest pain, shortness of breath, wheezing, or difficulty breathing  · Severe headache; face, neck, or ear pain  · Severe, constant pain in the lower right side of your belly (abdominal)  · Continued vomiting (cant keep liquids down)  · Frequent diarrhea (more than 5 times a day); blood (red or black color) or mucus in diarrhea  · Feeling weak, dizzy, or like you are going to faint  · Extreme thirst  · Fever of 100.4°F (38°C) or higher, or as directed by your healthcare provider  Date Last Reviewed: 9/25/2015  © 3611-6669 [employer]. [address] [place] [address], [place] [address]. All rights reserved. This information is not intended as a substitute for professional medical care. Always follow your healthcare professional's instructions.      Please follow up with your Primary care provider within 2-5 days if your signs and symptoms have not resolved or worsen.     If your condition worsens or fails to improve we recommend that you receive another  evaluation at the emergency room immediately or contact your primary medical clinic to discuss your concerns.   You must understand that you have received an Urgent Care treatment only and that you may be released before all of your medical problems are known or treated. You, the patient, will arrange for follow up care as instructed.     RED FLAGS/WARNING SYMPTOMS DISCUSSED WITH PATIENT THAT WOULD WARRANT EMERGENT MEDICAL ATTENTION. PATIENT VERBALIZED UNDERSTANDING.

## 2021-05-06 ENCOUNTER — PATIENT MESSAGE (OUTPATIENT)
Dept: RESEARCH | Facility: HOSPITAL | Age: 26
End: 2021-05-06

## 2022-05-30 ENCOUNTER — OFFICE VISIT (OUTPATIENT)
Dept: PSYCHIATRY | Facility: CLINIC | Age: 27
End: 2022-05-30
Payer: COMMERCIAL

## 2022-05-30 DIAGNOSIS — F41.1 GAD (GENERALIZED ANXIETY DISORDER): ICD-10-CM

## 2022-05-30 DIAGNOSIS — F32.1 MODERATE MAJOR DEPRESSION: Primary | ICD-10-CM

## 2022-05-30 PROCEDURE — 99999 PR PBB SHADOW E&M-EST. PATIENT-LVL II: ICD-10-PCS | Mod: PBBFAC,,, | Performed by: CASE MANAGER/CARE COORDINATOR

## 2022-05-30 PROCEDURE — 90791 PR PSYCHIATRIC DIAGNOSTIC EVALUATION: ICD-10-PCS | Mod: S$GLB,,, | Performed by: CASE MANAGER/CARE COORDINATOR

## 2022-05-30 PROCEDURE — 99999 PR PBB SHADOW E&M-EST. PATIENT-LVL II: CPT | Mod: PBBFAC,,, | Performed by: CASE MANAGER/CARE COORDINATOR

## 2022-05-30 PROCEDURE — 90791 PSYCH DIAGNOSTIC EVALUATION: CPT | Mod: S$GLB,,, | Performed by: CASE MANAGER/CARE COORDINATOR

## 2022-05-30 NOTE — PROGRESS NOTES
"Primary Care Behavioral Health: Initial  Patient Name: Babar Casper  Date:  5/30/2022   Site:  [facility]  Referral source:   Chief complaint/reason for encounter:  Anxiety and depression    History of present illness:  Mr. Babar Casper is a 26 y.o. Not  or /a male.  Patient was seen, examined and chart was reviewed.  Babar Casper reviewed and agreed to informed consent and the limits of confidentiality. Patient shared that he has difficulties with depression and anxiety. Patient noted passive suicidal thoughts almost daily and that he had previously thought of a plan of cutting his wrists. Patient denied any previous attempt and stated that he would talk to his girlfriend and a coworker. Patient also noted that his girlfriend is a protective factor for him. Patient shared that he and his girlfriend have been together for three years and have lived together for almost three years. Patient reportedly has few friends at this time. Patient works at the [employer] in [place] as a  and studies kinesiology in school. Patient stated that his job often leads to feels of anxiety. Patient shared that he had a "nervous breakdown" that lasted for about 30 minutes in early May due to having to work alone for a shift and potentially needing to work alone even longer. Patient stated he cried and felt his heart racing at this time. Patient shared that he enjoys exercising, martial arts, and video games.    No past medical history on file.      Current Outpatient Medications:     cyanocobalamin (VITAMIN B-12) 100 MCG tablet, Take 100 mcg by mouth once daily., Disp: , Rfl:     fluticasone (FLONASE) 50 mcg/actuation nasal spray, 1 spray (50 mcg total) by Each Nare route 2 (two) times daily as needed for Rhinitis or Allergies. (Patient not taking: Reported on 1/31/2020), Disp: 1 Bottle, Rfl: 1    multivitamin capsule, Take 1 capsule by mouth once daily., Disp: , Rfl:     " mupirocin (BACTROBAN) 2 % ointment, Apply to affected area 3 times daily (Patient not taking: Reported on 1/31/2020), Disp: 22 g, Rfl: 1    ondansetron (ZOFRAN ODT) 4 MG TbDL, Take 1 tablet (4 mg total) by mouth every 6 (six) hours as needed (Nausea/vomiting)., Disp: 12 tablet, Rfl: 0    ondansetron (ZOFRAN) 4 MG tablet, Take 1 tablet (4 mg total) by mouth every 8 (eight) hours as needed for Nausea., Disp: 30 tablet, Rfl: 0    ranitidine (ZANTAC) 300 MG tablet, Take 1 tablet (300 mg total) by mouth nightly., Disp: 30 tablet, Rfl: 1    zinc gluconate 50 mg tablet, Take 50 mg by mouth once daily., Disp: , Rfl:       Psychiatric history:  Psychiatric medication: Took Zoloft for seven days in 2017.  Previous hospitalizations: Patient denied  History of outpatient treatment: Patient reportedly saw a psychiatrist around 2017 for one session.  Previous suicide attempt:  Patient denied suicidal attempt. Patient did acknowledge that he has held a knife to his wrist previously with the last time being within the past week. He denied cutting himself.  Family history of psychiatric illness: Patient stated his father and aunt are prescribed Lexapro and his half-brother is prescribed Zoloft.    Current and past substance use:  Alcohol:  Patient reportedly drinks two to three times per week and has one drink each time on average. Stated about a year ago that he drank two to three drinks per night.  Drugs:  Patient reportedly smokes marijuana daily but acknowledged that he has been smoking three to four days per week currently.   Tobacco:  Denied current use.  Caffeine:  Patient reportedly drink two double shots of coffee concentrate daily.    Psychiatric symptoms:  Depression:  Reported diminished mood for at least half of the day for 10 days in the past two weeks; difficulties concentrating; passive suicidal thoughts; noted depressive symptoms started when he was about 15 years old and increased in the last 3.5  "years.  Fallon/Hypomania:  Denied high energy that he cannot control but acknowledged some increased energy at work.  Anxiety:  Social anxiety; hear racing when thinking about work or school; isolates when anxious; irritability; picks fingernails; reported that anxiety started about age 15 and has increased the last 3.5 years.  Thoughts:  Denied current delusions or hallucinations. Patient reported that a baby deer  in his arms when he was 10 years old and that he had hallucinations that night of his bed being made of dead deer.  Suicidal thoughts/behaviors:  Patient denied active suicidal ideation but did acknowledge passive suicidal thoughts. He reported he has thoughts of this kind daily but denied any intent. He did report that his plan would be to cut himself and that within the past week he put a knife against his wrist but did not cut himself. Patient stated that he wants to live and improve himself. He stated that his girlfriend and a coworker know about these thoughts and that he would tell them if he experiences suicidal thoughts. Patient denied any homicidal ideation, plan and intent.  Patient noted agreement to call 911/and or present to the ED if he experienced suicidal or homicidal ideation, plan or intent. Patient was also provided with national suicide hotline and agreed that he would call the number if he has suicidal thoughts.  Self-injury:  Patient denied but did acknowledge that he has previously put a knife on his skin but denied cutting himself. Patient stated this last occurred within the past week.  Sleep: Described sleep as "restless" and that he gets 6-7 hours of sleep per night.  Trauma: Patient shared that a baby deer  in his arms as part of a research study with a relative when he was 10 years old. Patient noted hallucinations that night of thinking his bed was made of dead deer. He noted that he slept with the light on for the next few nights.      Mental Status Exam:  General " appearance:  appears stated age, casually dressed, appropriately groomed  Speech:  normal rate, normal tone, normal pitch, normal volume  Level of cooperation:  cooperative  Thought processes:  logical, goal-directed  Mood:  Saddened   Thought content:  no illusions, no visual hallucinations, no auditory hallucinations, no delusions, no active or passive homicidal thoughts, no active suicidal ideation, no obsessions, no compulsions, no violence; patient acknowledged passive suicidal thoughts almost daily  Affect:  Mood congruent and somewhat anxious  Orientation:  oriented to person, place, situation and date  Memory/Attention and Concentration:  No gross deficits made evident during conversation.  Judgment and insight: fair  Language:  intact    Over the last 2 weeks, how often have you been bothered by any of the following problems?  Little interest or pleasure in doing things: Several days  Feeling down, depressed, or hopeless: Nearly every day  Trouble falling or staying asleep, or sleeping too much: More than half the days  Feeling tired or having little energy: Nearly every day  Poor appetite or overeating: More than half the days  Feeling bad about yourself - or that you are a failure or have let yourself or your family down: More than half the days  Trouble concentrating on things, such as reading the newspaper or watching television: Several days  Moving or speaking so slowly that other people could have noticed. Or the opposite - being so fidgety or restless that you have been moving around a lot more than usual: More than half the days  Thoughts that you would be better off dead, or of hurting yourself in some way: Nearly every day  PHQ-9 Total Score: 19  If you checked off any problems, how difficult have these problems made it for you to do your work, take care of things at home, or get along with other people?: Somewhat difficult      GAD7 5/30/2022   1. Feeling nervous, anxious, or on edge? 2   2. Not  being able to stop or control worrying? 3   3. Worrying too much about different things? 1   4. Trouble relaxing? 2   5. Being so restless that it is hard to sit still? 1   6. Becoming easily annoyed or irritable? 2   7. Feeling afraid as if something awful might happen? 1   SANDY-7 Score 12       Impression:    Patient presented with symptoms of anxiety and depression. Patient reported thoughts about suicide nearly daily but denied any intent. He stated that these thoughts started occurring around the age of 17 years old. Patient shared his girlfriend is a protective factor for him. Patient also shared that a friend of his attempted suicide when he was a teenager. Patient was provided with national suicide hotline. He noted agreement to call 911 and/or present to the ED if he has suicidal thoughts. Patient reported that he is not currently prescribed any psychiatric medication but noted that he is scheduled to meet with a psychiatrist on June 23, 2022 for medication management. Patient would likely benefit from more consistent and longer-term therapy than I can provide in this setting. Patient agreed to a referral for longer-term therapy. He also agreed to meet with me for one more session for continuity of care. Patient appears to lack effective self-care as he reportedly thinks about work often. Patient noted that he would like to engage in martial arts again. This could be a way for him to increase self-care and increase positive socialization which would help increase positive mood. Patient appeared motivated to work on reaching his therapeutic goals.      Diagnosis:    1. Moderate major depression     2. SANDY (generalized anxiety disorder)          Plan:      1) Increase Self-Care  2) CBT to increase positive mood  3) Referral to longer-term therapy  4) Patient will keep his appointment with psychiatrist to discuss potential medication management      Length of Appointment: 60 minutes        Raj Whiteside  [place] License #[id number]

## 2022-05-31 ENCOUNTER — TELEPHONE (OUTPATIENT)
Dept: PSYCHIATRY | Facility: CLINIC | Age: 27
End: 2022-05-31
Payer: COMMERCIAL

## 2022-06-03 ENCOUNTER — TELEPHONE (OUTPATIENT)
Dept: PSYCHIATRY | Facility: CLINIC | Age: 27
End: 2022-06-03
Payer: COMMERCIAL

## 2022-06-03 NOTE — TELEPHONE ENCOUNTER
Called pt to schedule appt no answer LVM (left clinic contact info)      ----- Message from Raj Whiteside PsyD sent at 5/30/2022  9:32 AM CDT -----  Please schedule for follow-up in primary care in a little more than a month when I have an opening.

## 2022-06-29 ENCOUNTER — PATIENT MESSAGE (OUTPATIENT)
Dept: PSYCHIATRY | Facility: CLINIC | Age: 27
End: 2022-06-29
Payer: COMMERCIAL

## 2022-10-11 ENCOUNTER — OFFICE VISIT (OUTPATIENT)
Dept: URGENT CARE | Facility: CLINIC | Age: 27
End: 2022-10-11
Payer: OTHER MISCELLANEOUS

## 2022-10-11 VITALS
TEMPERATURE: 99 F | HEIGHT: 71 IN | OXYGEN SATURATION: 99 % | SYSTOLIC BLOOD PRESSURE: 128 MMHG | BODY MASS INDEX: 20.44 KG/M2 | HEART RATE: 83 BPM | RESPIRATION RATE: 17 BRPM | DIASTOLIC BLOOD PRESSURE: 77 MMHG | WEIGHT: 146 LBS

## 2022-10-11 DIAGNOSIS — M54.9 DORSALGIA, UNSPECIFIED: ICD-10-CM

## 2022-10-11 DIAGNOSIS — S39.012A ACUTE MYOFASCIAL STRAIN OF LUMBAR REGION, INITIAL ENCOUNTER: Primary | ICD-10-CM

## 2022-10-11 PROCEDURE — 99203 OFFICE O/P NEW LOW 30 MIN: CPT | Mod: S$GLB,,, | Performed by: PHYSICIAN ASSISTANT

## 2022-10-11 PROCEDURE — 72100 XR LUMBAR SPINE 2 OR 3 VIEWS: ICD-10-PCS | Mod: S$GLB,,, | Performed by: RADIOLOGY

## 2022-10-11 PROCEDURE — 72100 X-RAY EXAM L-S SPINE 2/3 VWS: CPT | Mod: S$GLB,,, | Performed by: RADIOLOGY

## 2022-10-11 PROCEDURE — 99203 PR OFFICE/OUTPT VISIT, NEW, LEVL III, 30-44 MIN: ICD-10-PCS | Mod: S$GLB,,, | Performed by: PHYSICIAN ASSISTANT

## 2022-10-11 NOTE — LETTER
[facility] [facility]  [address], [address]  [place] [address]  Phone: [phone]  Fax: [phone]  [facility]: [id number]    Pt Name: Babar Casper  Injury Date: 10/10/2022   Employee ID: [id number] Date of First Treatment: 10/11/2022   Company: [employer]      Appointment Time: 12:00 PM Arrived: 12:17pm   Provider: Silvio Fiore PA-C Time Out:1:50pm     Office Treatment:   1. Acute myofascial strain of lumbar region, initial encounter    2. Dorsalgia, unspecified          Patient Instructions: Daily home exercises/warm soaks    Restrictions: Avoid frequent bending/lifting/twisting, Avoid climbing/kneeling/squatting, No lifting/pushing/pulling more than 10 lbs, Home today (Begin light duty 10/12/2022)     Return Appointment: 10/18/2022 @ 12pm

## 2022-10-11 NOTE — PROGRESS NOTES
Subjective:       Patient ID: Babar Casper is a 27 y.o. male.    Chief Complaint: Back Pain    W/C visit for low back pain. Patient works for [employer]. 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 DOI: 10/10/2022. Pain today 7/10. Patient taking OTC NSAIDS and heating pad.  Patient denies bowel or bladder incontinence or saddle anesthesia.  He does report a few instances of pain radiating from the low back to the right foot.  Says he has injured his back before, no surgeries or procedures. MEB    Back Pain  The current episode started today. The problem is unchanged. The pain is present in the lumbar spine. The quality of the pain is described as aching. The pain does not radiate. The pain is at a severity of 7/10. The pain is severe. The symptoms are aggravated by coughing, sitting and standing. Stiffness is present All day. Pertinent negatives include no abdominal pain, bladder incontinence, bowel incontinence, chest pain, dysuria, fever or numbness. He has tried NSAIDs and heat for the symptoms. The treatment provided no relief.     Constitution: Negative for chills, fatigue and fever.   HENT:  Negative for facial trauma.    Neck: Negative for neck pain and neck stiffness.   Cardiovascular:  Negative for chest trauma and chest pain.   Eyes:  Negative for eye trauma and eye pain.   Respiratory:  Negative for shortness of breath.    Gastrointestinal:  Negative for abdominal pain and bowel incontinence.   Genitourinary:  Negative for dysuria, urgency, bladder incontinence and hematuria.   Musculoskeletal:  Positive for pain and back pain. Negative for joint pain, abnormal ROM of joint, muscle cramps and history of spine disorder.   Skin:  Negative for rash and wound.   Neurological:  Negative for coordination disturbances, numbness and tingling.      Objective:      Physical Exam  Vitals and nursing note reviewed.   Constitutional:        General: He is not in acute distress.     Appearance: Normal appearance. He is well-developed.   HENT:      Head: Normocephalic and atraumatic.      Right Ear: Hearing and external ear normal.      Left Ear: Hearing and external ear normal.      Nose: Nose normal. No nasal deformity.   Eyes:      General: Lids are normal.      Conjunctiva/sclera: Conjunctivae normal.      Right eye: Right conjunctiva is not injected.      Left eye: Left conjunctiva is not injected.   Neck:      Trachea: Trachea normal.   Cardiovascular:      Pulses: Normal pulses.           Dorsalis pedis pulses are 2+ on the right side and 2+ on the left side.        Posterior tibial pulses are 2+ on the right side and 2+ on the left side.   Pulmonary:      Effort: Pulmonary effort is normal. No respiratory distress.      Breath sounds: No stridor.   Musculoskeletal:      Cervical back: Normal and normal range of motion. No spinous process tenderness or muscular tenderness.      Thoracic back: Normal.      Lumbar back: Tenderness present. No deformity. Decreased range of motion. Negative right straight leg raise test and negative left straight leg raise test.        Back:    Skin:     General: Skin is warm and dry.      Findings: No abrasion or bruising.   Neurological:      Mental Status: He is alert.      GCS: GCS eye subscore is 4. GCS verbal subscore is 5. GCS motor subscore is 6.      Sensory: No sensory deficit.      Motor: Motor function is intact. No weakness (BLE strength 5/5).      Deep Tendon Reflexes: Reflexes are normal and symmetric.      Reflex Scores:       Patellar reflexes are 2+ on the right side and 2+ on the left side.       Achilles reflexes are 2+ on the right side and 2+ on the left side.  Psychiatric:         Attention and Perception: He is attentive.         Speech: Speech normal.         Behavior: Behavior normal.         Thought Content: Thought content normal.         Lumbar x-ray unremarkable.  Pending radiology  review.      Assessment:       1. Acute myofascial strain of lumbar region, initial encounter    2. Dorsalgia, unspecified        Plan:       Pt prefers OTC ibuprofen     Patient Instructions: Daily home exercises/warm soaks   Restrictions: Avoid frequent bending/lifting/twisting, Avoid climbing/kneeling/squatting, No lifting/pushing/pulling more than 10 lbs, Home today (Begin light duty 10/12/2022)  Follow up in about 1 week (around 10/18/2022).

## 2022-10-18 ENCOUNTER — OFFICE VISIT (OUTPATIENT)
Dept: URGENT CARE | Facility: CLINIC | Age: 27
End: 2022-10-18
Payer: OTHER MISCELLANEOUS

## 2022-10-18 VITALS
BODY MASS INDEX: 20.44 KG/M2 | TEMPERATURE: 98 F | RESPIRATION RATE: 16 BRPM | DIASTOLIC BLOOD PRESSURE: 71 MMHG | SYSTOLIC BLOOD PRESSURE: 128 MMHG | WEIGHT: 146 LBS | OXYGEN SATURATION: 100 % | HEIGHT: 71 IN | HEART RATE: 55 BPM

## 2022-10-18 DIAGNOSIS — S39.012D ACUTE MYOFASCIAL STRAIN OF LUMBAR REGION, SUBSEQUENT ENCOUNTER: ICD-10-CM

## 2022-10-18 DIAGNOSIS — M54.10 RADICULAR PAIN OF LOWER EXTREMITY: ICD-10-CM

## 2022-10-18 DIAGNOSIS — Z02.6 ENCOUNTER RELATED TO WORKER'S COMPENSATION CLAIM: Primary | ICD-10-CM

## 2022-10-18 PROCEDURE — 99214 OFFICE O/P EST MOD 30 MIN: CPT | Mod: S$GLB,,, | Performed by: FAMILY MEDICINE

## 2022-10-18 PROCEDURE — 99214 PR OFFICE/OUTPT VISIT, EST, LEVL IV, 30-39 MIN: ICD-10-PCS | Mod: S$GLB,,, | Performed by: FAMILY MEDICINE

## 2022-10-18 RX ORDER — METHYLPREDNISOLONE 4 MG/1
TABLET ORAL
Qty: 1 EACH | Refills: 0 | Status: SHIPPED | OUTPATIENT
Start: 2022-10-18 | End: 2023-06-13

## 2022-10-18 NOTE — LETTER
[facility]  [address], [address]  [place] [address]  Phone: [phone]  Fax: [phone]  [facility]: [id number]    Pt Name: Babar Casper  Injury Date: 10/10/2022   Employee ID: [id number] Date of Treatment: 10/18/2022   Company: [employer]      Appointment Time: 07:45 AM Arrived: 800   Provider: Raj Radford MD Time Out:900     Office Treatment:   1. Encounter related to worker's compensation claim    2. Acute myofascial strain of lumbar region, subsequent encounter    3. Radicular pain of lower extremity      Medications Ordered This Encounter   Medications    methylPREDNISolone (MEDROL DOSEPACK) 4 mg tablet      Patient Instructions: Daily home exercises/warm soaks, Attention not to aggravate affected area    Restrictions:  (continue current restrictions)     Return Appointment: 10/26 at 830a

## 2022-10-18 NOTE — PROGRESS NOTES
Subjective:       Patient ID: Babar Casper is a 27 y.o. male.    Chief Complaint: Back Pain    Pt presents to urgent care for a w/c follow up.  He works for [employer].  He states that the pain in his back is better,  now he is having sharp pains through his right thigh.  Pt is taking ibuprofen for the pain.  Pain scale today- 2.  DOI- 10/11/2022.     Back Pain  This is a new problem. The current episode started in the past 7 days. The problem occurs constantly. The problem has been gradually improving since onset. The pain radiates to the right thigh. The pain is at a severity of 2/10. The pain is mild. He has tried nothing for the symptoms. The treatment provided mild relief.     Musculoskeletal:  Positive for back pain.      Objective:      Physical Exam  Musculoskeletal:         General: Tenderness present.        Back:       Comments: FRM but with discomfort           Assessment:       1. Encounter related to worker's compensation claim    2. Acute myofascial strain of lumbar region, subsequent encounter    3. Radicular pain of lower extremity          Plan:         Medications Ordered This Encounter   Medications    methylPREDNISolone (MEDROL DOSEPACK) 4 mg tablet     Sig: Dispense one pavan. use as directed     Dispense:  1 each     Refill:  0     Patient Instructions: Daily home exercises/warm soaks, Attention not to aggravate affected area   Restrictions:  (continue current restrictions)  No follow-ups on file.

## 2022-10-26 ENCOUNTER — OFFICE VISIT (OUTPATIENT)
Dept: URGENT CARE | Facility: CLINIC | Age: 27
End: 2022-10-26
Payer: OTHER MISCELLANEOUS

## 2022-10-26 VITALS
HEART RATE: 46 BPM | SYSTOLIC BLOOD PRESSURE: 112 MMHG | DIASTOLIC BLOOD PRESSURE: 68 MMHG | OXYGEN SATURATION: 99 % | RESPIRATION RATE: 16 BRPM

## 2022-10-26 DIAGNOSIS — S39.012D ACUTE MYOFASCIAL STRAIN OF LUMBAR REGION, SUBSEQUENT ENCOUNTER: Primary | ICD-10-CM

## 2022-10-26 DIAGNOSIS — M54.9 DORSALGIA, UNSPECIFIED: ICD-10-CM

## 2022-10-26 DIAGNOSIS — Z02.6 ENCOUNTER RELATED TO WORKER'S COMPENSATION CLAIM: ICD-10-CM

## 2022-10-26 PROCEDURE — 99214 OFFICE O/P EST MOD 30 MIN: CPT | Mod: S$GLB,,, | Performed by: FAMILY MEDICINE

## 2022-10-26 PROCEDURE — 99214 PR OFFICE/OUTPT VISIT, EST, LEVL IV, 30-39 MIN: ICD-10-PCS | Mod: S$GLB,,, | Performed by: FAMILY MEDICINE

## 2022-10-26 RX ORDER — LIDOCAINE 50 MG/G
1 PATCH TOPICAL DAILY
Qty: 15 PATCH | Refills: 1 | Status: SHIPPED | OUTPATIENT
Start: 2022-10-26 | End: 2023-06-13

## 2022-10-26 NOTE — LETTER
[facility]  [address], [address]  [facility] [address]  Phone: [phone]  Fax: [phone]  [facility]: [id number]    Pt Name: Babar Casper  Injury Date: 10/10/2022   Employee ID: [id number] Date of Treatment: 10/26/2022   Company: [employer]      Appointment Time: 08:25 AM Arrived: 840   Provider: Raj Radford MD Time Out:945     Office Treatment:   1. Acute myofascial strain of lumbar region, subsequent encounter    2. Encounter related to worker's compensation claim    3. Dorsalgia, unspecified      Medications Ordered This Encounter   Medications    LIDOcaine (LIDODERM) 5 %      Patient Instructions: Attention not to aggravate affected area, Daily home exercises/warm soaks, PT to be scheduled once authorized, Begin Physical Therapy    Restrictions: Sit or stand as needed, No Prolonged standing/walking (limit lifting to 15#)may advance duties as tolerated     Return Appointment: 11/16 at 9am      Some OTC measures to help in recovery(if no allergies to, renal issues or pregnant):  Tylenol 325mg 3x per day  Ibuprofen 400mg 3x per day OR Aleve regular strength one tablet 2x per day  Take Pepcid 20mg BID  If applicable or discussed: Magnesium OTC daily; Topical Voltaren Gel; Lidocaine patches  Massage area if possible  Resting of the injured area  Ice for ankle, wrist or elbow injury  Elevation of the injured area if applicable  Heating pad for muscle injury  Stretching/ROM exercises as described in clinic.

## 2022-10-26 NOTE — PROGRESS NOTES
Subjective:       Patient ID: Babar Casper is a 27 y.o. male.    Chief Complaint: Follow-up     Return Visit.  Pt works for [employer].  Follow up low back injury.  Pt states he is better.  Still with some stifness and pain with a lot of standing or walking.  Pt finished steroids yest, using ibuprofen only now.    Pain  3/10.     initial visit 10/11/2022 W/C visit for low back pain. Patient works for [employer]. 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 DOI: 10/10/2022. Pain today 7/10. Patient taking OTC NSAIDS and heating pad.  Patient denies bowel or bladder incontinence or saddle anesthesia.  He does report a few instances of pain radiating from the low back to the right foot.  Says he has injured his back before, no surgeries or procedures. MEB       Follow-up  This is a new problem. The current episode started 1 to 4 weeks ago. The problem occurs intermittently. The problem has been gradually improving. The symptoms are aggravated by standing and walking. Treatments tried: ibuprofin. The treatment provided moderate relief.   ROS     Objective:      Physical Exam       Back:      Full forward flexion  Pain with twisting and lateral bending  Assessment:       1. Acute myofascial strain of lumbar region, subsequent encounter    2. Encounter related to worker's compensation claim    3. Dorsalgia, unspecified          Plan:     Pt feels that he is improving but still with some pain- tried lifting a 10kg animal but had inc in pain.  Will initiate PT given timeline and conservative treatment tried.     Medications Ordered This Encounter   Medications    LIDOcaine (LIDODERM) 5 %     Sig: Place 1 patch onto the skin once daily. Remove & Discard patch within 12 hours or as directed by MD     Dispense:  15 patch     Refill:  1     Patient Instructions: Attention not to aggravate affected area, Daily home exercises/warm soaks, PT to be  scheduled once authorized, Begin Physical Therapy   Restrictions: Sit or stand as needed, No Prolonged standing/walking (limit lifting to 15#)  No follow-ups on file.

## 2022-11-17 ENCOUNTER — TELEPHONE (OUTPATIENT)
Dept: URGENT CARE | Facility: CLINIC | Age: 27
End: 2022-11-17
Payer: COMMERCIAL

## 2022-11-17 NOTE — TELEPHONE ENCOUNTER
Called patient and left a voice message and call back number regarding the missed [facility] health appointment. AFG

## 2022-12-30 ENCOUNTER — OFFICE VISIT (OUTPATIENT)
Dept: URGENT CARE | Facility: CLINIC | Age: 27
End: 2022-12-30
Payer: OTHER MISCELLANEOUS

## 2022-12-30 VITALS
WEIGHT: 146 LBS | DIASTOLIC BLOOD PRESSURE: 69 MMHG | BODY MASS INDEX: 20.44 KG/M2 | RESPIRATION RATE: 17 BRPM | SYSTOLIC BLOOD PRESSURE: 116 MMHG | HEART RATE: 64 BPM | OXYGEN SATURATION: 99 % | HEIGHT: 71 IN | TEMPERATURE: 99 F

## 2022-12-30 DIAGNOSIS — S39.012D ACUTE MYOFASCIAL STRAIN OF LUMBAR REGION, SUBSEQUENT ENCOUNTER: Primary | ICD-10-CM

## 2022-12-30 PROCEDURE — 99214 OFFICE O/P EST MOD 30 MIN: CPT | Mod: S$GLB,,, | Performed by: FAMILY MEDICINE

## 2022-12-30 PROCEDURE — 99214 PR OFFICE/OUTPT VISIT, EST, LEVL IV, 30-39 MIN: ICD-10-PCS | Mod: S$GLB,,, | Performed by: FAMILY MEDICINE

## 2022-12-30 NOTE — LETTER
[facility] [place] [address], [address]  [facility] [address]  Phone: [phone]  Fax: [phone]  [facility]: [id number]    Pt Name: Babar Casper  Injury Date: 10/10/2022   Employee ID: [id number] Date of Treatment: 12/30/2022   Company: [employer]      Appointment Time: 08:05 AM Arrived: 830   Provider: Raj Radford MD Time Out:855     Office Treatment:   1. Acute myofascial strain of lumbar region, subsequent encounter          Patient Instructions: Daily home exercises/warm soaks    Restrictions: Regular Duty, Discharged from Occupational Health            You have a work related injury. Medical care and treatment required as a result of a work-related injury  should be focused on restoring functional ability required to meet the patient's daily and work activities and return to work, while striving to restore the patient's health to its pre-injury status in so far as is feasible.  Some OTC measures to help in recovery(if no allergies to, renal issues or pregnant):  Tylenol 325mg 3x per day  Ibuprofen 400mg 3x per day OR Aleve regular strength one tablet 2x per day  Take Pepcid 20mg BID  If applicable or discussed: Magnesium OTC daily; Topical Voltaren Gel; Lidocaine patches  Massage area if possible  Resting of the injured area  Ice for ankle, wrist or elbow injury  Elevation of the injured area if applicable  Heating pad for muscle injury  Stretching/ROM exercises as described in clinic.

## 2022-12-30 NOTE — PROGRESS NOTES
Subjective:       Patient ID: Babar Casper is a 27 y.o. male.    Chief Complaint: Back Pain    WC Return Visit.  Pt works for [employer].  Follow up low back injury.  Pt states he is better.  Still with some soreness in his back.  Patient would like to be cleared to return to work.    Pain  2/10.      Back Pain  The pain is at a severity of 2/10. The pain is mild.     Musculoskeletal:  Positive for back pain.      Objective:      Physical Exam    Normal ROM with minimal discomfort.   Assessment:       1. Acute myofascial strain of lumbar region, subsequent encounter        Plan:     Patient states he is working regular duty at work the last few weeks without issue.  Need full release.       Patient Instructions: Daily home exercises/warm soaks   Restrictions: Regular Duty, Discharged from Occupational Health  No follow-ups on file.

## 2023-05-04 ENCOUNTER — OFFICE VISIT (OUTPATIENT)
Dept: URGENT CARE | Facility: CLINIC | Age: 28
End: 2023-05-04
Payer: OTHER MISCELLANEOUS

## 2023-05-04 VITALS
DIASTOLIC BLOOD PRESSURE: 84 MMHG | HEIGHT: 71 IN | RESPIRATION RATE: 16 BRPM | TEMPERATURE: 98 F | HEART RATE: 79 BPM | SYSTOLIC BLOOD PRESSURE: 132 MMHG | BODY MASS INDEX: 20.58 KG/M2 | WEIGHT: 147 LBS | OXYGEN SATURATION: 97 %

## 2023-05-04 DIAGNOSIS — M54.50 LUMBOSACRAL PAIN: ICD-10-CM

## 2023-05-04 DIAGNOSIS — M54.16 LUMBAR RADICULOPATHY, CHRONIC: ICD-10-CM

## 2023-05-04 DIAGNOSIS — Z02.6 ENCOUNTER RELATED TO WORKER'S COMPENSATION CLAIM: Primary | ICD-10-CM

## 2023-05-04 DIAGNOSIS — M54.9 DORSALGIA, UNSPECIFIED: ICD-10-CM

## 2023-05-04 PROCEDURE — 99213 PR OFFICE/OUTPT VISIT, EST, LEVL III, 20-29 MIN: ICD-10-PCS | Mod: 25,S$GLB,, | Performed by: FAMILY MEDICINE

## 2023-05-04 PROCEDURE — 99213 OFFICE O/P EST LOW 20 MIN: CPT | Mod: 25,S$GLB,, | Performed by: FAMILY MEDICINE

## 2023-05-04 PROCEDURE — 96372 THER/PROPH/DIAG INJ SC/IM: CPT | Mod: S$GLB,,, | Performed by: FAMILY MEDICINE

## 2023-05-04 PROCEDURE — 96372 PR INJECTION,THERAP/PROPH/DIAG2ST, IM OR SUBCUT: ICD-10-PCS | Mod: S$GLB,,, | Performed by: FAMILY MEDICINE

## 2023-05-04 RX ORDER — NAPROXEN 500 MG/1
500 TABLET ORAL 2 TIMES DAILY WITH MEALS
Qty: 30 TABLET | Refills: 0 | Status: SHIPPED | OUTPATIENT
Start: 2023-05-04 | End: 2024-05-03

## 2023-05-04 RX ORDER — LIDOCAINE 560 MG/1
1 PATCH PERCUTANEOUS; TOPICAL; TRANSDERMAL 2 TIMES DAILY
Qty: 15 PATCH | Refills: 1 | Status: SHIPPED | OUTPATIENT
Start: 2023-05-04 | End: 2023-06-13

## 2023-05-04 RX ORDER — KETOROLAC TROMETHAMINE 30 MG/ML
30 INJECTION, SOLUTION INTRAMUSCULAR; INTRAVENOUS
Status: COMPLETED | OUTPATIENT
Start: 2023-05-04 | End: 2023-05-04

## 2023-05-04 RX ORDER — METHOCARBAMOL 500 MG/1
500 TABLET, FILM COATED ORAL 2 TIMES DAILY PRN
Qty: 30 TABLET | Refills: 0 | Status: SHIPPED | OUTPATIENT
Start: 2023-05-04 | End: 2023-05-14

## 2023-05-04 RX ADMIN — KETOROLAC TROMETHAMINE 30 MG: 30 INJECTION, SOLUTION INTRAMUSCULAR; INTRAVENOUS at 01:05

## 2023-05-04 NOTE — LETTER
Urgent Care - [place] [address], [address]  [facility] [address]  Phone: [phone]  Fax: [phone]  [employer]: [id number]    Pt Name: Babar Casper  Injury Date: 04/27/2023   Employee ID: [id number] Date of First Treatment: 05/04/2023   Company: [employer]      Appointment Time: 12:45 PM Arrived: 1245   Provider: Raj Radford MD Time Out:120     Office Treatment:   1. Encounter related to worker's compensation claim    2. Lumbosacral pain    3. Dorsalgia, unspecified    4. Lumbar radiculopathy, chronic      Medications Ordered This Encounter   Medications    ketorolac injection 30 mg    LIDOcaine 4 % PtMd    methocarbamoL (ROBAXIN) 500 MG Tab    naproxen (NAPROSYN) 500 MG tablet      Patient Instructions: Attention not to aggravate affected area, Daily home exercises/warm soaks, MRI to be scheduled once authorized    Restrictions: Disabled until next office visit     Return Appointment: 5/11 or sooner If needed     You have a work related injury. Medical care and treatment required as a result of a work-related injury  should be focused on restoring functional ability required to meet your daily and work activities and return to work, while striving to restore your health to pre-injury status in so far as is feasible.  If Rx a medication that can be sedating, DO NOT TAKE DURING YOUR WORK DAY.    Some OTC measures to help in recovery(if no allergies to, renal issues or pregnant):  Tylenol 325mg 3x per day  Ibuprofen 400mg 3x per day OR Aleve regular strength one tablet 2x per day  Take Pepcid 20mg BID  If applicable or discussed: Magnesium OTC daily; Topical Voltaren Gel; Lidocaine patches  Massage area if possible  Resting of the injured area  Ice for ankle, wrist or elbow injury  Elevation of the injured area if applicable  Heating pad for muscle injury  Stretching/ROM exercises as described in clinic.   ** BE ADVISED: You should be in  regular contact with your W/C  to know the status of your claim and /or (if any)pending referrals**

## 2023-05-04 NOTE — PROGRESS NOTES
Subjective:      Patient ID: Babar Casper is a 27 y.o. male.    Chief Complaint: Back Pain    Pt presents to urgent care for a w/c initial visit.  He works for [employer].  He states that on Thursday, he was lifting a transfer case and felt a twinge in his right shoulder, and then his back started to hurt.  He then continued to work. He was seen at the ER on Friday.  Pain scale today- 8/10. He was given muscle relaxers, Norco and steroids at the ER.  He finished all the medication as of today.       4/28/2023- 19-year-old male who has a benign past medical history, presents complaining of lumbar back pain that began yesterday.  No history any fall or direct trauma but the patient states he was lifting weights earlier in the day and then at work was lifting crates containing small primaries for he works at the [employer].  He admits that he is had significant trouble moving.  No complaints of any fever or chills.  No bowel or bladder problems including incontinence or retention.  No similar prior problems.  No complaints of any numbness to the perineum.  No radicular pain into the legs.  No abdominal pain, nausea vomiting.  No penile or testicular pain.  No sensory changes to the foot.    Back Pain  This is a new problem. The current episode started in the past 7 days. The problem occurs constantly. The problem is unchanged. The quality of the pain is described as aching. The pain radiates to the right thigh. The pain is at a severity of 8/10. He has tried nothing for the symptoms. The treatment provided mild relief.     Musculoskeletal:  Positive for back pain.   Objective:     Physical Exam  Musculoskeletal:      Lumbar back: Spasms and tenderness present. Decreased range of motion.        Back:       Comments: + slump test on right      Assessment:      1. Encounter related to worker's compensation claim    2. Lumbosacral pain    3. Dorsalgia, unspecified    4. Lumbar radiculopathy, chronic       Plan:   Patient states that he was in a state of paralysis at his house.  Presented to ER with some was given anti-inflammatories as well as narcotic medication in hospital as well as narcotics to go home.  No other imaging aside from x-rays were performed  Given nature of patient's pain will order MRI lumbar spine as concern for disc injury    Patient to follow-up next week    Medications Ordered This Encounter   Medications    ketorolac injection 30 mg    LIDOcaine 4 % PtMd     Sig: Apply 1 application topically 2 (two) times a day.     Dispense:  15 patch     Refill:  1    methocarbamoL (ROBAXIN) 500 MG Tab     Sig: Take 1 tablet (500 mg total) by mouth 2 (two) times daily as needed.     Dispense:  30 tablet     Refill:  0    naproxen (NAPROSYN) 500 MG tablet     Sig: Take 1 tablet (500 mg total) by mouth 2 (two) times daily with meals.     Dispense:  30 tablet     Refill:  0     Patient Instructions: Attention not to aggravate affected area, Daily home exercises/warm soaks, MRI to be scheduled once authorized   Restrictions: Disabled until next office visit  No follow-ups on file.

## 2023-05-11 ENCOUNTER — OFFICE VISIT (OUTPATIENT)
Dept: URGENT CARE | Facility: CLINIC | Age: 28
End: 2023-05-11
Payer: OTHER MISCELLANEOUS

## 2023-05-11 VITALS
OXYGEN SATURATION: 98 % | WEIGHT: 147 LBS | BODY MASS INDEX: 20.58 KG/M2 | HEIGHT: 71 IN | TEMPERATURE: 98 F | HEART RATE: 50 BPM | SYSTOLIC BLOOD PRESSURE: 127 MMHG | DIASTOLIC BLOOD PRESSURE: 77 MMHG | RESPIRATION RATE: 16 BRPM

## 2023-05-11 DIAGNOSIS — M54.16 LUMBAR RADICULOPATHY, CHRONIC: ICD-10-CM

## 2023-05-11 DIAGNOSIS — S39.012D ACUTE MYOFASCIAL STRAIN OF LUMBAR REGION, SUBSEQUENT ENCOUNTER: ICD-10-CM

## 2023-05-11 DIAGNOSIS — M54.9 DORSALGIA, UNSPECIFIED: ICD-10-CM

## 2023-05-11 DIAGNOSIS — M54.50 LUMBOSACRAL PAIN: Primary | ICD-10-CM

## 2023-05-11 PROCEDURE — 96372 PR INJECTION,THERAP/PROPH/DIAG2ST, IM OR SUBCUT: ICD-10-PCS | Mod: S$GLB,,, | Performed by: FAMILY MEDICINE

## 2023-05-11 PROCEDURE — 99213 PR OFFICE/OUTPT VISIT, EST, LEVL III, 20-29 MIN: ICD-10-PCS | Mod: 25,S$GLB,, | Performed by: FAMILY MEDICINE

## 2023-05-11 PROCEDURE — 96372 THER/PROPH/DIAG INJ SC/IM: CPT | Mod: S$GLB,,, | Performed by: FAMILY MEDICINE

## 2023-05-11 PROCEDURE — 99213 OFFICE O/P EST LOW 20 MIN: CPT | Mod: 25,S$GLB,, | Performed by: FAMILY MEDICINE

## 2023-05-11 RX ORDER — METHYLPREDNISOLONE 4 MG/1
TABLET ORAL
Qty: 1 EACH | Refills: 0 | Status: SHIPPED | OUTPATIENT
Start: 2023-05-11 | End: 2023-06-13

## 2023-05-11 RX ORDER — KETOROLAC TROMETHAMINE 30 MG/ML
30 INJECTION, SOLUTION INTRAMUSCULAR; INTRAVENOUS
Status: COMPLETED | OUTPATIENT
Start: 2023-05-11 | End: 2023-05-11

## 2023-05-11 RX ADMIN — KETOROLAC TROMETHAMINE 30 MG: 30 INJECTION, SOLUTION INTRAMUSCULAR; INTRAVENOUS at 08:05

## 2023-05-11 NOTE — LETTER
Urgent Care - [place] [address], [address]  [place] [address]  Phone: [phone]  Fax: [phone]  [employer]: [id number]    Pt Name: Babar Casper  Injury Date: 04/27/2023   Employee ID: [id number] Date of Treatment: 05/11/2023   Company: [employer]      Appointment Time: 09:15 AM Arrived: 805   Provider: Raj Radford MD Time Out:840     Office Treatment:   1. Lumbosacral pain    2. Dorsalgia, unspecified    3. Lumbar radiculopathy, chronic    4. Acute myofascial strain of lumbar region, subsequent encounter      Medications Ordered This Encounter   Medications    ketorolac injection 30 mg    methylPREDNISolone (MEDROL DOSEPACK) 4 mg tablet      Patient Instructions: Daily home exercises/warm soaks, MRI to be scheduled once authorized, Begin Physical Therapy, PT to be scheduled once authorized, Attention not to aggravate affected area    Restrictions:  (continue current restrictions)     Return Appointment: 5/23 or sooner if needed

## 2023-05-11 NOTE — PROGRESS NOTES
Subjective:      Patient ID: Babar Casper is a 27 y.o. male.    Chief Complaint: Back Pain    Pt returns for W/C related injury. States that he is showing signs of improvement but still feels debilitated to perform daily work functions by pain of daily routine. Pain increases with bending, twisting and any movements affecting flexion of lumbar. Still using naproxen and flexeril as prescribed. PS 6-7/10    5/04/2023: Pt presents to urgent care for a w/c initial visit.  He works for [employer].  He states that on Thursday, he was lifting a transfer case and felt a twinge in his right shoulder, and then his back started to hurt.  He then continued to work. He was seen at the ER on Friday.  Pain scale today- 8/10. He was given muscle relaxers, Norco and steroids at the ER.  He finished all the medication as of today.         4/28/2023- 19-year-old male who has a benign past medical history, presents complaining of lumbar back pain that began yesterday.  No history any fall or direct trauma but the patient states he was lifting weights earlier in the day and then at work was lifting crates containing small primaries for he works at the [employer] center.  He admits that he is had significant trouble moving.  No complaints of any fever or chills.  No bowel or bladder problems including incontinence or retention.  No similar prior problems.  No complaints of any numbness to the perineum.  No radicular pain into the legs.  No abdominal pain, nausea vomiting.  No penile or testicular pain.  No sensory changes to the foot.       Back Pain    Musculoskeletal:  Positive for back pain.   Objective:     Physical Exam     Lumbar back: Spasms and tenderness present. Decreased range of motion.        Back:       Comments: + slump test on right   Assessment:      1. Lumbosacral pain    2. Dorsalgia, unspecified    3. Lumbar radiculopathy, chronic    4. Acute myofascial strain of lumbar region, subsequent encounter       Plan:       Medications Ordered This Encounter   Medications    ketorolac injection 30 mg    methylPREDNISolone (MEDROL DOSEPACK) 4 mg tablet     Sig: Dispense one pavan. use as directed     Dispense:  1 each     Refill:  0     Patient Instructions: Daily home exercises/warm soaks, MRI to be scheduled once authorized, Begin Physical Therapy, PT to be scheduled once authorized, Attention not to aggravate affected area   Restrictions:  (continue current restrictions)  No follow-ups on file.

## 2023-05-16 ENCOUNTER — HOSPITAL ENCOUNTER (OUTPATIENT)
Dept: RADIOLOGY | Facility: HOSPITAL | Age: 28
Discharge: HOME | End: 2023-05-16
Attending: FAMILY MEDICINE
Payer: OTHER MISCELLANEOUS

## 2023-05-16 ENCOUNTER — TELEPHONE (OUTPATIENT)
Dept: URGENT CARE | Facility: CLINIC | Age: 28
End: 2023-05-16
Payer: COMMERCIAL

## 2023-05-16 DIAGNOSIS — M54.9 DORSALGIA, UNSPECIFIED: Primary | ICD-10-CM

## 2023-05-16 DIAGNOSIS — M54.16 LUMBAR RADICULOPATHY, CHRONIC: ICD-10-CM

## 2023-05-16 DIAGNOSIS — M54.50 LUMBOSACRAL PAIN: ICD-10-CM

## 2023-05-16 DIAGNOSIS — M54.10 RADICULAR PAIN OF LOWER EXTREMITY: ICD-10-CM

## 2023-05-16 PROCEDURE — 72148 MRI LUMBAR SPINE W/O DYE: CPT | Mod: TC,PO

## 2023-05-16 PROCEDURE — 72148 MRI LUMBAR SPINE W/O DYE: CPT | Mod: 26,,, | Performed by: RADIOLOGY

## 2023-05-16 PROCEDURE — 72148 MRI LUMBAR SPINE WITHOUT CONTRAST: ICD-10-PCS | Mod: 26,,, | Performed by: RADIOLOGY

## 2023-05-16 NOTE — TELEPHONE ENCOUNTER
Called to discuss MRI results. NO answer, unable to leave VM.   PT pending. Will send referral to pain mgmt as well given MRI findings.

## 2023-05-19 ENCOUNTER — CLINICAL SUPPORT (OUTPATIENT)
Dept: REHABILITATION | Facility: HOSPITAL | Age: 28
End: 2023-05-19
Attending: FAMILY MEDICINE
Payer: COMMERCIAL

## 2023-05-19 DIAGNOSIS — M54.41 CHRONIC BILATERAL LOW BACK PAIN WITH RIGHT-SIDED SCIATICA: Primary | ICD-10-CM

## 2023-05-19 DIAGNOSIS — G89.29 CHRONIC BILATERAL LOW BACK PAIN WITH RIGHT-SIDED SCIATICA: Primary | ICD-10-CM

## 2023-05-19 DIAGNOSIS — M62.81 MUSCLE WEAKNESS: ICD-10-CM

## 2023-05-19 PROCEDURE — 97161 PT EVAL LOW COMPLEX 20 MIN: CPT | Mod: PN | Performed by: PHYSICAL THERAPIST

## 2023-05-19 PROCEDURE — 97110 THERAPEUTIC EXERCISES: CPT | Mod: PN | Performed by: PHYSICAL THERAPIST

## 2023-05-19 PROCEDURE — 97140 MANUAL THERAPY 1/> REGIONS: CPT | Mod: PN | Performed by: PHYSICAL THERAPIST

## 2023-05-19 NOTE — PLAN OF CARE
[facility]  Physical Therapy Initial Evaluation    Name: Babar Casper  Clinic Number: [id number]    Therapy Diagnosis:   Encounter Diagnoses   Name Primary?    Chronic bilateral low back pain with right-sided sciatica Yes    Muscle weakness      Physician: Raj Radford, *    Physician Orders: PT Eval and Treat   Medical Diagnosis from Referral:   M54.50 (ICD-10-CM) - Lumbosacral pain   M54.9 (ICD-10-CM) - Dorsalgia, unspecified   M54.16 (ICD-10-CM) - Lumbar radiculopathy, chronic   S39.012D (ICD-10-CM) - Acute myofascial strain of lumbar region, subsequent encounter     Evaluation Date: 5/19/2023  Authorization Period Expiration: 11-16-23  Plan of Care Expiration: 7-14-23  Progress Note Due: 6-19-23  Visit # / Visits authorized: 1/9  FOTO: Issued Visit #: 1    MD Follow up appointment: 5-23-23    Precautions: Standard    Time In: 12:30  Time Out: 1:25  Total Billable Time: 55 minutes    Subjective   Date of onset: 4-27-23  History of current condition - Babar reports: lifting transfer cage with a monkey in it that weighed less than 20# but more than 10# and carried cage on shoulder and after monkey moved into his regular cage then moved box in front of him holding with one hand and leaned forward bending at waist with legs straight and lowered the transfer cage to ground and felt a strain in R shoulder that lasted 1/2 hour and then resolved.  Within an hour of incident began with LBP that progressively worsened and had trouble walking by end of day.  That night he began with severe muscle spasms while in shower and fell to ground and then crawled into bed and later got father to drive pt to ER.  Pt was given medication and then a few days later went to Urgent Care for WC and prescribed more medication and stayed on back for past few weeks.  Pt states he tried doing a few stretches and is progressively slowly improving but still with pain.    Pt had a similar incident in October  when lifting a monkey out of cage and was holding monkey away from body and had been in squatted position had some pain but not to this level.  Pt states he initially began with back pain as a teenager after jumping and some pain, some soreness but no serious pain.  Pt states within past 5 years at work has had incidents with lifting but used a back brace and eventually did get better      Medical History: general good health, hx of fx ankle L and healed in a cast  No past medical history on file.    Surgical History:   Babar Casper  has no past surgical history on file.    Medications:   Babar has a current medication list which includes the following prescription(s): cyanocobalamin, fluticasone propionate, hydrocodone-acetaminophen, lidocaine, lidocaine, methylprednisolone, methylprednisolone, multivitamin, mupirocin, naproxen, ondansetron, ondansetron, ranitidine, and zinc gluconate.    Allergies:   Review of patient's allergies indicates:  No Known Allergies     Imaging, MRI studies: 1. No acute process.  Normal appearance of the terminal cord.  2. Multilevel age advanced degenerative disc changes at L3-L4 through L5-S1 most notable at L4-L5 with there is a broad-based caudally migrating disc extrusion contributing to mild to moderate narrowing.  There is contact of the traversing subarticular L5 nerves without clear mass effect.  No high-grade spinal canal or foraminal narrowing.  Level by level details given above.    X:ray1. Straightening of the lumbar lordosis and right-sided curvature of the lumbar spine could be positional or associated with muscle spasm.  Otherwise no acute abnormality identified.    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    Prior Level of Function: no limitations   Current Level of Function: limited with sitting, FB activities   Exercise for Wellness: work out at home with free weights and an iron gym to do pull up     Pain:  Current 4/10, worst 6/10, best 0/10   Location: bilateral LB into R buttock and can go to mid posterior R thigh  Description: Sharp  Aggravating Factors: Sitting, Flexing, and Getting out of bed/chair  Easing Factors: lying down  Sleep is not disturbed. Sleeping position: stomach  Saddle symptoms: neg  Bowel or bladder:  neg    Pts goals: get back to normal and be able to lift weights and exercise and back to work     Objective     Postural examination in standing:  - normal lumbar lordosis  - forward head  - forward shoulders  - R  hip high  - L shoulder high  - L lateral shift trunk     Postural examination in sitting:   - slouched posture   - forward head  - forward shoulders      Functional assessment:   - walking: decreased trunk mobility noted with gait   - sit to stand: mod difficulty  - sit to supine: min difficulty       - supine to sit: min difficulty  - supine to prone: min difficulty    Lumbar active range of motion in standing is:  - flexion - toes                     - extension -  75%                         - left side bending -  to knee         - right side bending -  to knee          Pelvic positioning: AR R      Flexibility testing:  - hamstrings:     90/90 test R 15 L 15           - gastrocnemius:   DF ankle R 5 degrees L 5 degrees    Muscle Strength  MMT R L   Hip flexion 3+/5 4/5   Hip abduction 4-/5 4/5   Hip extension 3+/5 4/5   Knee extension 5/5 5/5   Knee flexion 5/5 5/5   Ankle dorsiflexion 5/5 5/5   EHL 5/5 5/5   Ankle eversion 5/5 5/5       Endurance is not tested    Lumbar Special tests:  SLR neg    Palpation: mod tightness and TTP lumbar paraspinals and R gluteals/piriformis    Joint mobility: mod decreased mobility  FRS L L  3    Sensation: Intact  Reflexes: +1 knees and ankle      CMS Impairment/Limitation/Restriction for FOTO lumbar Survey    Therapist reviewed FOTO scores for Babar Casper on 5/19/2023.   FOTO documents entered into EPIC - see Media section.    Limitation Score: 38%       TREATMENT   Treatment Time In: 12:55  Treatment Time Out: 1;25  Total Treatment time separate from Evaluation: 30 minutes    Babar received therapeutic exercises to develop posture and pelvic positioning  for 20 minutes including  Instructed pt in spine and pelvic girdle anatomy and biomechanics and effect of exercise to promote normal positioning, motion and to decrease pain.      Push/pull x 3  Press up x 6 build up to 10    Instructed pt in increased awareness of symptoms.  Instructed pt in push/pull at onset of increased symptoms followed by press up and to perform at least TID.        Pain level 1/10 after session and improved positioning noted    Babar received the following manual therapy techniques: Joint mobilizations with MET were applied to the: L3 for 10 minutes, including:  MET SL R to L3     Home Exercises and Patient Education Provided    Education provided:   - Instructed pt in spine and pelvic girdle anatomy and biomechanics and effect of exercise to promote normal positioning, motion and to decrease pain.    - Instructed pt in increased awareness of symptoms.  Instructed pt in push/pull at onset of increased symptoms followed by press up.    - HEP   - exercise in pain free zone     Written Home Exercises Provided: Yes   Exercises were reviewed and Babar was able to demonstrate them prior to the end of the session.  Babar demonstrated good  understanding of the education provided.     See EMR under Patient Instructions for exercises provided 5/19/2023.    Assessment   Babar is a 27 y.o. male referred to outpatient Physical Therapy with a medical diagnosis of   M54.50 (ICD-10-CM) - Lumbosacral pain   M54.9 (ICD-10-CM) - Dorsalgia,  unspecified   M54.16 (ICD-10-CM) - Lumbar radiculopathy, chronic   S39.012D (ICD-10-CM) - Acute myofascial strain of lumbar region, subsequent encounter   . Pt presents with LBP with slight lateral shift of trunk to L with L3 dysfunction along with pelvic dysfunction with decreased strength noted    Pt prognosis is Good.   Pt will benefit from skilled outpatient Physical Therapy to address the deficits stated above and in the chart below, provide pt/family education, and to maximize pt's level of independence.     Plan of care discussed with patient: Yes  Pt's spiritual, cultural and educational needs considered and patient is agreeable to the plan of care and goals as stated below:     Anticipated Barriers for therapy: none    Medical Necessity is demonstrated by the following  History  Co-morbidities and personal factors that may impact the plan of care Co-morbidities:   none    Personal Factors:   no deficits     low   Examination  Body Structures and Functions, activity limitations and participation restrictions that may impact the plan of care Body Regions:   back  lower extremities  trunk    Body Systems:    ROM  strength    Participation Restrictions:   Work and exercise    Activity limitations:   Learning and applying knowledge  no deficits    General Tasks and Commands  no deficits    Communication  no deficits    Mobility  lifting and carrying objects    Self care  dressing    Domestic Life  shopping  cooking  doing house work (cleaning house, washing dishes, laundry)  assisting others    Interactions/Relationships  no deficits    Life Areas  no deficits    Community and Social Life  no deficits         moderate   Clinical Presentation evolving clinical presentation with changing clinical characteristics moderate   Decision Making/ Complexity Score: low     GOALS:   Short Term Goals:  4 weeks  Increase strength 1/2 muscle grade  Improve postural awareness of pelvis to independently identify dysfunction  with min assist from PT  Be able to perform HEP with minimal cueing required    Long Term Goals: 8 weeks  Improve muscle strength 1 muscle grade  Improve muscle strength with MMT to 4+/5 to 5/5  Improve and stabilize proper pelvic positioning  Restore ability to ambulate with normal pain free gait  Walking for ADL and work will be restored without increased pain  Restore ability to stand for ADL and work without increased pain  Restore ability to sit for ADL and work without increased pain  Restore ability to participate in an exercise program for Wellness ;;  Restore ability to perform ADL's and household activities independently and without increased pain  Restore ability to carry monkeys and cages as needed for work to perform full job duties as  #2    Plan   Plan of care Certification: 5/19/2023 to 7-14-23.    Outpatient Physical Therapy 1-2 times weekly for 8 weeks to include the following interventions: Therapeutic exercises, manual therapy, neuromuscular re-education, therapeutic activities, modalities such as moist heat, ice, ultrasound and electrical stimulation, lumbar mechanical traction and dry needling will be considered and utilized as needed    Selina Dempsey, PT

## 2023-05-22 ENCOUNTER — CLINICAL SUPPORT (OUTPATIENT)
Dept: REHABILITATION | Facility: HOSPITAL | Age: 28
End: 2023-05-22
Payer: COMMERCIAL

## 2023-05-22 DIAGNOSIS — M62.81 MUSCLE WEAKNESS: ICD-10-CM

## 2023-05-22 DIAGNOSIS — M54.41 CHRONIC BILATERAL LOW BACK PAIN WITH RIGHT-SIDED SCIATICA: Primary | ICD-10-CM

## 2023-05-22 DIAGNOSIS — G89.29 CHRONIC BILATERAL LOW BACK PAIN WITH RIGHT-SIDED SCIATICA: Primary | ICD-10-CM

## 2023-05-22 PROCEDURE — 97110 THERAPEUTIC EXERCISES: CPT | Mod: PN | Performed by: PHYSICAL THERAPIST

## 2023-05-22 PROCEDURE — 97530 THERAPEUTIC ACTIVITIES: CPT | Mod: PN | Performed by: PHYSICAL THERAPIST

## 2023-05-22 NOTE — PROGRESS NOTES
"[facility]   Physical Therapy Treatment Note     Name: Babar Casper  Clinic Number: [id number]    Therapy Diagnosis:   Encounter Diagnoses   Name Primary?    Chronic bilateral low back pain with right-sided sciatica Yes    Muscle weakness      Physician: Raj Radford, *    Visit Date: 5/22/2023    Evaluation Date: 5/19/2023  Authorization Period Expiration: 11-16-23  Plan of Care Expiration: 7-14-23  Progress Note Due: 6-19-23  Visit # / Visits authorized: 2/9  FOTO: Issued Visit #: 1     MD Follow up appointment: 5-23-23     Precautions: Standard       PTA Visit #: 0/5     Time In: 7:48  Time Out: 8:32  Total Billable Time: 44 minutes    SUBJECTIVE     Pt reports: feeling much better Pain getting in and out of car.  Pt had no pain when getting up and getting ready no pain.    Pt was compliant with home exercise program.  Response to previous treatment: felt better  Functional change: able to sit with less pain in erect posture     Pain: 5/10 at end of session 1/10  Location: right LB    OBJECTIVE     Pelvic positioning: AR R , level at end of session  L3 level   Min muscle tightness noted     Treatment     Babar received the treatments listed below:      therapeutic exercises to develop strength, endurance, ROM, posture, and core stabilization for 19 minutes including:  Push/pull x 3  Press up x 10     Kinesiotape was performed with 10" I strip SI region and to the lumbo-sacral region for pain relief and support. 3 strips used  Instructed pt in use, care and precautions with tape.       Therapeutic activities were performed to improve function of riding in car.  Ergonomic assessment of car seat was performed and modifications to car seat were performed to improve support and decrease pain with sitting in car  Instructed pt in proper transfer techniques . Pt to add towel to seat and instructed pt to use lumbar roll for any sitting to maintain trunk extension.  Discussed possible " return to work.  Discussed if pt would be able to avoid sitting, FB activities for now and work in standing and maintain erect posture and if needed ability to perform push/pull and press ups r   Reiterated Instructed pt in increased awareness of symptoms.  Instructed pt in push/pull at onset of increased symptoms.    Therapeutic activity performed for 25 min    Patient Education and Home Exercises     Home Exercises Provided and Patient Education Provided     Education provided:   - Instructed pt in increased awareness of symptoms.  Instructed pt in push/pull at onset of increased symptoms.    - consideration lumbar brace for support  - HEP   - exercise in pain free zone       Written Home Exercises Provided: Patient instructed to cont prior HEP. Exercises were reviewed and Babar was able to demonstrate them prior to the end of the session.  Babar demonstrated good  understanding of the education provided. See EMR under Patient Instructions for exercises provided during therapy sessions    ASSESSMENT     Pt with improved symptoms overall.  Pt's truck does not provide good support and may benefit from adjustments.  Patient appears to understand increased awareness of symptoms and to perform push/pull at onset of increased symptoms.        Babar Is progressing well towards his goals.   Pt prognosis is Good.     Pt will continue to benefit from skilled outpatient physical therapy to address the goals listed in the initial evaluation, provide pt/family education and to maximize pt's level of independence in the home and community environment.     Pt's spiritual, cultural and educational needs considered and pt agreeable to plan of care and goals.     Anticipated barriers to physical therapy: none    GOALS:   Short Term Goals:  4 weeks (Progressing, not met)  Increase strength 1/2 muscle grade  Improve postural awareness of pelvis to independently identify dysfunction with min assist from PT  Be able to perform HEP with  minimal cueing required     Long Term Goals: 8 weeks (Progressing, not met)  Improve muscle strength 1 muscle grade  Improve muscle strength with MMT to 4+/5 to 5/5  Improve and stabilize proper pelvic positioning  Restore ability to ambulate with normal pain free gait  Walking for ADL and work will be restored without increased pain  Restore ability to stand for ADL and work without increased pain  Restore ability to sit for ADL and work without increased pain  Restore ability to participate in an exercise program for Wellness ;;  Restore ability to perform ADL's and household activities independently and without increased pain  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      Progress strength and stab as tolerated     Selina Dempsey, PT

## 2023-05-23 ENCOUNTER — OFFICE VISIT (OUTPATIENT)
Dept: URGENT CARE | Facility: CLINIC | Age: 28
End: 2023-05-23
Payer: OTHER MISCELLANEOUS

## 2023-05-23 VITALS
RESPIRATION RATE: 16 BRPM | HEIGHT: 71 IN | HEART RATE: 63 BPM | OXYGEN SATURATION: 99 % | WEIGHT: 147 LBS | SYSTOLIC BLOOD PRESSURE: 122 MMHG | DIASTOLIC BLOOD PRESSURE: 80 MMHG | TEMPERATURE: 98 F | BODY MASS INDEX: 20.58 KG/M2

## 2023-05-23 DIAGNOSIS — M54.10 RADICULAR PAIN OF LOWER EXTREMITY: ICD-10-CM

## 2023-05-23 DIAGNOSIS — M54.9 DORSALGIA, UNSPECIFIED: Primary | ICD-10-CM

## 2023-05-23 DIAGNOSIS — M54.16 LUMBAR RADICULOPATHY, CHRONIC: ICD-10-CM

## 2023-05-23 DIAGNOSIS — Z02.6 ENCOUNTER RELATED TO WORKER'S COMPENSATION CLAIM: ICD-10-CM

## 2023-05-23 DIAGNOSIS — M54.50 LUMBOSACRAL PAIN: ICD-10-CM

## 2023-05-23 PROCEDURE — 99213 OFFICE O/P EST LOW 20 MIN: CPT | Mod: S$GLB,,, | Performed by: FAMILY MEDICINE

## 2023-05-23 PROCEDURE — 99213 PR OFFICE/OUTPT VISIT, EST, LEVL III, 20-29 MIN: ICD-10-PCS | Mod: S$GLB,,, | Performed by: FAMILY MEDICINE

## 2023-05-23 NOTE — LETTER
[facility] - [place] [address], [address]  [place] [address]  Phone: [phone]  Fax: [phone]  [facility]: [phone]    Pt Name: Babar Casper  Injury Date: 04/27/2023   Employee ID: [id number] Date of First Treatment: 05/23/2023   Company: [employer]      Appointment Time: 07:45 AM Arrived: 8:02 am   Provider: Raj Radford MD Time Out:8:40 am     Office Treatment:   1. Dorsalgia, unspecified    2. Lumbar radiculopathy, chronic    3. Lumbosacral pain    4. Radicular pain of lower extremity              Patient Instructions: Daily home exercises/warm soaks.   Attention not to aggravate affected area    Restrictions:  (continue current restrictions)            Return Appointment: 6/7/2023

## 2023-05-23 NOTE — PROGRESS NOTES
Subjective:      Patient ID: Babar Casper is a 27 y.o. male.    Chief Complaint: Back Pain    Pt returns for work related injury. States his pain has moderately improved but still impeding ability to perform work related functions. Onset of pain mostly caused by sitting for extended periods of time with driving, as well as bending, twisting and flexion/extension. No longer taking rx and using motrin prn. States the two PT sessions he underwent have been greatly successful in helping his sx's. PS 3/10    5/11/23: Pt returns for W/C related injury. States that he is showing signs of improvement but still feels debilitated to perform daily work functions by pain of daily routine. Pain increases with bending, twisting and any movements affecting flexion of lumbar. Still using naproxen and flexeril as prescribed. PS 6-7/10     5/04/2023: Pt presents to urgent care for a w/c initial visit.  He works for [employer].  He states that on Thursday, he was lifting a transfer case and felt a twinge in his right shoulder, and then his back started to hurt.  He then continued to work. He was seen at the ER on Friday.  Pain scale today- 8/10. He was given muscle relaxers, Norco and steroids at the ER.  He finished all the medication as of today.       4/28/2023- 19-year-old male who has a benign past medical history, presents complaining of lumbar back pain that began yesterday.  No history any fall or direct trauma but the patient states he was lifting weights earlier in the day and then at work was lifting crates containing small primaries for he works at the [employer].  He admits that he is had significant trouble moving.  No complaints of any fever or chills.  No bowel or bladder problems including incontinence or retention.  No similar prior problems.  No complaints of any numbness to the perineum.  No radicular pain into the legs.  No abdominal pain, nausea vomiting.  No penile or testicular pain.  No  sensory changes to the foot.  ROS  Objective:     Physical Exam  Musculoskeletal:        Back:       Physical Exam   Lumbar back: Spasms and tenderness present. Decreased range of motion.   Back:       Assessment:      1. Dorsalgia, unspecified    2. Lumbar radiculopathy, chronic    3. Lumbosacral pain    4. Radicular pain of lower extremity    5. Encounter related to worker's compensation claim      Plan:   Pain mgmt referral made due to sx and correlating MRI results. In PT- improving but still with discomfort and concern for ability to perform job taks, even if restricted.               No follow-ups on file.

## 2023-05-24 NOTE — PROGRESS NOTES
"[facility]   Physical Therapy Treatment Note     Name: Babar Casper  Clinic Number: [id number]    Therapy Diagnosis:   Encounter Diagnoses   Name Primary?    Chronic bilateral low back pain with right-sided sciatica Yes    Muscle weakness      Physician: Raj Radford, *    Visit Date: 5/25/2023    Evaluation Date: 5/19/2023  Authorization Period Expiration: 11-16-23  Plan of Care Expiration: 7-14-23  Progress Note Due: 6-19-23  Visit # / Visits authorized: 3/9  FOTO: Issued Visit #: 1     MD Follow up appointment: 5-23-23     Precautions: Standard       PTA Visit #: 0/5     Time In: 10:05  Time Out: 10:45  Total Billable Time: 30 minutes    SUBJECTIVE     Pt reports: improving slowly  Pt states some pain after riding in truck waiting on foam wedge to come in using another pillow for LB and seat currently   Pt states tape did help.    Pt was compliant with home exercise program.  Response to previous treatment: felt better  Functional change: able to sit with less pain in erect posture     Pain: 2/10  at best 0/10  at worst 5/10 at end of session 1/10  Location: right LB    OBJECTIVE     Pelvic positioning: AR R , level at end of session  L3 level   Min muscle tightness noted     Treatment     Babar received the treatments listed below:      therapeutic exercises to develop strength, endurance, ROM, posture, and core stabilization for 20 direct  minutes including:  Push/pull x 3  Press up x 10    Arm lift prone x 10   Leg lift prone x 10  Push/pull x 3  Press up x 10    Kinesiotape was performed with 10" I strip SI region and to the lumbo-sacral region for pain relief and support. 3 strips used  Instructed pt in use, care and precautions with tape.       Therapeutic activities were performed to improve function of riding in car.  Ergonomic assessment of car seat was performed and further modifications to car seat with adding pilllow to cushion and telling pt to roll up towel for " lumbar support were performed to improve support and decrease pain with sitting in car  Instructed pt in proper transfer techniques . Reviewed in [employer] different lumbar supports to consider   Reiterated Instructed pt in increased awareness of symptoms.  Instructed pt in push/pull at onset of increased symptoms.    Therapeutic activity performed for 10 min    Patient Education and Home Exercises     Home Exercises Provided and Patient Education Provided     Education provided:   - Instructed pt in increased awareness of symptoms.  Instructed pt in push/pull at onset of increased symptoms.    - consideration lumbar brace for support  - HEP   - exercise in pain free zone       Written Home Exercises Provided:yes. Exercises were reviewed and Babar was able to demonstrate them prior to the end of the session.  Babar demonstrated good  understanding of the education provided. See EMR under Patient Instructions for exercises provided during therapy sessions    ASSESSMENT     Pt with improved symptoms overall.  Pt's truck does not provide good support and may benefit from adjustments.  Patient appears to understand increased awareness of symptoms and to perform push/pull at onset of increased symptoms.        Babar Is progressing well towards his goals.   Pt prognosis is Good.     Pt will continue to benefit from skilled outpatient physical therapy to address the goals listed in the initial evaluation, provide pt/family education and to maximize pt's level of independence in the home and community environment.     Pt's spiritual, cultural and educational needs considered and pt agreeable to plan of care and goals.     Anticipated barriers to physical therapy: none    GOALS:   Short Term Goals:  4 weeks (Progressing, not met)  Increase strength 1/2 muscle grade  Improve postural awareness of pelvis to independently identify dysfunction with min assist from PT  Be able to perform HEP with minimal cueing required     Long Term  Goals: 8 weeks (Progressing, not met)  Improve muscle strength 1 muscle grade  Improve muscle strength with MMT to 4+/5 to 5/5  Improve and stabilize proper pelvic positioning  Restore ability to ambulate with normal pain free gait  Walking for ADL and work will be restored without increased pain  Restore ability to stand for ADL and work without increased pain  Restore ability to sit for ADL and work without increased pain  Restore ability to participate in an exercise program for Wellness ;;  Restore ability to perform ADL's and household activities independently and without increased pain  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      Progress strength and stab as tolerated     Selina Dempsey, PT

## 2023-05-25 ENCOUNTER — CLINICAL SUPPORT (OUTPATIENT)
Dept: REHABILITATION | Facility: HOSPITAL | Age: 28
End: 2023-05-25
Payer: COMMERCIAL

## 2023-05-25 DIAGNOSIS — M54.41 CHRONIC BILATERAL LOW BACK PAIN WITH RIGHT-SIDED SCIATICA: Primary | ICD-10-CM

## 2023-05-25 DIAGNOSIS — G89.29 CHRONIC BILATERAL LOW BACK PAIN WITH RIGHT-SIDED SCIATICA: Primary | ICD-10-CM

## 2023-05-25 DIAGNOSIS — M62.81 MUSCLE WEAKNESS: ICD-10-CM

## 2023-05-25 PROCEDURE — 97530 THERAPEUTIC ACTIVITIES: CPT | Mod: PN | Performed by: PHYSICAL THERAPIST

## 2023-05-25 PROCEDURE — 97110 THERAPEUTIC EXERCISES: CPT | Mod: PN | Performed by: PHYSICAL THERAPIST

## 2023-05-29 NOTE — PROGRESS NOTES
[facility]   Physical Therapy Treatment Note     Name: Babar Casper  Clinic Number: [id number]    Therapy Diagnosis:   Encounter Diagnoses   Name Primary?    Chronic bilateral low back pain with right-sided sciatica Yes    Muscle weakness      Physician: Raj Radford, *    Visit Date: 5/30/2023    Evaluation Date: 5/19/2023  Authorization Period Expiration: 11-16-23  Plan of Care Expiration: 7-14-23  Progress Note Due: 6-19-23  Visit # / Visits authorized: 4/9  FOTO: Issued Visit #: 1     MD Follow up appointment: 5-23-23     Precautions: Standard       PTA Visit #: 0/5     Time In: 1:03  Time Out: 1:50  Total Billable Time: 47 minutes    SUBJECTIVE     Pt reports:generally been feeling better.  Having some sharp pain in L side LB which started about 30 min after woke up.  Pt states he got some relief with ex but still some pain.  Got wedge for car seat and really helps and arm and leg lifts have really helped   Pt was compliant with home exercise program.  Response to previous treatment: felt better  Functional change: able to sit with less pain in erect posture     Pain: 4/10  at best 0/10  at end of session 0-1/10  Location: right LB    OBJECTIVE     Pelvic positioning: AR R , level at end of session  L3 level     Treatment     Babar received the treatments listed below:      therapeutic exercises to develop strength, endurance, ROM, posture, and core stabilization for 32 minutes including:  Pt with pelvic dysfunction and did not correct with push/pull.  Performed contract-relax to piriformis followed by push/pull which corrected dysfunction.  Pt was able to note a positive change in symptoms. Written instructions were provided to patient to be able to perform at home when push/pull alone does not work.       Push/pull x 3  Press up x 10   Arm lift prone x 10  Leg lift prone x 10    Arm and leg lift prone x 10  Push/pull x 3  Press up x 10    Pulldown with retraction with YTB  "x 10  Retraction with YTB x 10  ER with YTB x 10  Provided pt YTB for home use      (NP)Kinesiotape was performed with 10" I strip SI region and to the lumbo-sacral region for pain relief and support. 3 strips used  Instructed pt in use, care and precautions with tape.       Therapeutic activities were performed to restore function to return to work  Discussed work activities and with attempt at lifting 7# hot pack wrapped in towel to simulate carrying monkey and lifting into and out of cage pt led to pelvic dysfunction with lifting and felt slight increase in symptoms  Performed 10 reps of lifting 2# dumbbell up and placing on weight stack with 2 foam pads to simulate height pt feels high cage is and no dysfunction.  Pt with slight dysfunction after performing with 4# x 10 reps.  Pt required MET piriformis followed by push/pull to realign.    Therapeutic activity performed for 15 min    Patient Education and Home Exercises     Home Exercises Provided and Patient Education Provided     Education provided:   - Instructed pt in increased awareness of symptoms.  Instructed pt in push/pull at onset of increased symptoms.    - consideration lumbar brace for support  - HEP   - exercise in pain free zone       Written Home Exercises Provided:yes. Exercises were reviewed and Babar was able to demonstrate them prior to the end of the session.  Babar demonstrated good  understanding of the education provided. See EMR under Patient Instructions for exercises provided during therapy sessions    ASSESSMENT   Pt developed dysfunction and was unable to self mobilize but with MET piriformis and then push/pull  able to mobilize and note + change in symptoms.  Pt very limited with ability to lift monkeys at this time.  Pt may also benefit from scap stab to further improve stability with lifting.        Babar Is progressing well towards his goals.   Pt prognosis is Good.     Pt will continue to benefit from skilled outpatient physical " therapy to address the goals listed in the initial evaluation, provide pt/family education and to maximize pt's level of independence in the home and community environment.     Pt's spiritual, cultural and educational needs considered and pt agreeable to plan of care and goals.     Anticipated barriers to physical therapy: none    GOALS:   Short Term Goals:  4 weeks (Progressing, not met)  Increase strength 1/2 muscle grade  Improve postural awareness of pelvis to independently identify dysfunction with min assist from PT  Be able to perform HEP with minimal cueing required     Long Term Goals: 8 weeks (Progressing, not met)  Improve muscle strength 1 muscle grade  Improve muscle strength with MMT to 4+/5 to 5/5  Improve and stabilize proper pelvic positioning  Restore ability to ambulate with normal pain free gait  Walking for ADL and work will be restored without increased pain  Restore ability to stand for ADL and work without increased pain  Restore ability to sit for ADL and work without increased pain  Restore ability to participate in an exercise program for Wellness ;;  Restore ability to perform ADL's and household activities independently and without increased pain  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      Progress strength and stab as tolerated  Pt to contact work supervisor and find out what return to work options he has to work in restrictive capacity to avoid re-injury    Selina Dempsey, PT

## 2023-05-30 ENCOUNTER — CLINICAL SUPPORT (OUTPATIENT)
Dept: REHABILITATION | Facility: HOSPITAL | Age: 28
End: 2023-05-30
Payer: COMMERCIAL

## 2023-05-30 DIAGNOSIS — M54.41 CHRONIC BILATERAL LOW BACK PAIN WITH RIGHT-SIDED SCIATICA: Primary | ICD-10-CM

## 2023-05-30 DIAGNOSIS — M62.81 MUSCLE WEAKNESS: ICD-10-CM

## 2023-05-30 DIAGNOSIS — G89.29 CHRONIC BILATERAL LOW BACK PAIN WITH RIGHT-SIDED SCIATICA: Primary | ICD-10-CM

## 2023-05-30 PROCEDURE — 97530 THERAPEUTIC ACTIVITIES: CPT | Mod: PN | Performed by: PHYSICAL THERAPIST

## 2023-05-30 PROCEDURE — 97110 THERAPEUTIC EXERCISES: CPT | Mod: PN | Performed by: PHYSICAL THERAPIST

## 2023-05-31 NOTE — PROGRESS NOTES
[facility]   Physical Therapy Treatment Note     Name: Babar Casper  Clinic Number: [id number]    Therapy Diagnosis:   Encounter Diagnoses   Name Primary?    Chronic bilateral low back pain with right-sided sciatica Yes    Muscle weakness      Physician: Raj Radford, *    Visit Date: 6/1/2023    Evaluation Date: 5/19/2023  Authorization Period Expiration: 11-16-23  Plan of Care Expiration: 7-14-23  Progress Note Due: 6-19-23  Visit # / Visits authorized: 5/9  FOTO: Issued Visit #: 1     MD Follow up appointment: 5-23-23     Precautions: Standard       PTA Visit #: 0/5     Time In: 1:04  Time Out: 2:00  Total Billable Time: 56 minutes    SUBJECTIVE     Pt reports: having most of pain after sitting on toilet and gets better after exercise.  Pt states last couple of days more pain at times and have to maintain really strict extension principles.  Pt states upon awakening will feel some pain, but not enough to wake him up due to pain.     Pt was compliant with home exercise program.  Response to previous treatment: felt better  Functional change: able to sit with less pain in erect posture, walking or lying down during day    Pain: 5/10  at best 0/10  at end of session 0-1/10  Location: right LB    OBJECTIVE     Pelvic positioning: AR R , level at end of session  L5 FRS R  Treatment     Babar received the treatments listed below:      therapeutic exercises to develop strength, endurance, ROM, posture, and core stabilization for 46 minutes including:  Performed MET SL R  L5 and realigned and pt noted improved symptoms     Push/pull x 3  Press up x 10      Arm and leg lift prone x 20  Push/pull x 3  Press up x 10      Pulldown with retraction with YTB x 10  Retraction with YTB x 2/5  ER with YTB x 10  Fatigue at end  Pt with slight discomfort in LB which appears to be muscular at end of session Instructed pt to decrease back to 10 reps arm and leg lift for today and tomorrow and then  "progress to 15 reps Sat and 20 Sunday if doing okay   Verbal and tactile cueing provided for proper performance and recruitment.      Kinesiotape was performed with 10" I strip SI region and to the lumbo-sacral region for pain relief and support. 3 strips used  Instructed pt in use, care and precautions with tape.       Therapeutic activities were performed to restore function to return to work  Pt has not called work yet to determine if current restrictions could be accommodated.   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  Instructed pt in push/pull at onset of increased symptoms.    - consideration lumbar brace for support  - HEP   - exercise in pain free zone       Written Home Exercises Provided:yes. Exercises were reviewed and Babar was able to demonstrate them prior to the end of the session.  Babar demonstrated good  understanding of the education provided. See EMR under Patient Instructions for exercises provided during therapy sessions    ASSESSMENT   Pt developed dysfunction at L5 which may have occurred when performing different therapeutic activities which led to pelvic dysfunction also, but kept lifting at 2# and pt with min pain at end of session which was best level achieved.  Pt also understands to decrease reps with arm and leg lift prone for now and to stop at fatigue with scap stab ex to avoid compensation.        Babar Is progressing well towards his goals.   Pt prognosis is Good.     Pt will continue to benefit from skilled outpatient physical " therapy to address the goals listed in the initial evaluation, provide pt/family education and to maximize pt's level of independence in the home and community environment.     Pt's spiritual, cultural and educational needs considered and pt agreeable to plan of care and goals.     Anticipated barriers to physical therapy: none    GOALS:   Short Term Goals:  4 weeks (Progressing, not met)  Increase strength 1/2 muscle grade  Improve postural awareness of pelvis to independently identify dysfunction with min assist from PT  Be able to perform HEP with minimal cueing required     Long Term Goals: 8 weeks (Progressing, not met)  Improve muscle strength 1 muscle grade  Improve muscle strength with MMT to 4+/5 to 5/5  Improve and stabilize proper pelvic positioning  Restore ability to ambulate with normal pain free gait  Walking for ADL and work will be restored without increased pain  Restore ability to stand for ADL and work without increased pain  Restore ability to sit for ADL and work without increased pain  Restore ability to participate in an exercise program for Wellness ;;  Restore ability to perform ADL's and household activities independently and without increased pain  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  Pt understands to call if problem arises before next appt    Progress strength and stab as tolerated  Pt to contact work supervisor and find out what return to work options he has to work in restrictive capacity to avoid re-injury    Selina Dempsey, PT

## 2023-06-01 ENCOUNTER — CLINICAL SUPPORT (OUTPATIENT)
Dept: REHABILITATION | Facility: HOSPITAL | Age: 28
End: 2023-06-01
Payer: OTHER MISCELLANEOUS

## 2023-06-01 DIAGNOSIS — G89.29 CHRONIC BILATERAL LOW BACK PAIN WITH RIGHT-SIDED SCIATICA: Primary | ICD-10-CM

## 2023-06-01 DIAGNOSIS — M54.41 CHRONIC BILATERAL LOW BACK PAIN WITH RIGHT-SIDED SCIATICA: Primary | ICD-10-CM

## 2023-06-01 DIAGNOSIS — M62.81 MUSCLE WEAKNESS: ICD-10-CM

## 2023-06-01 PROCEDURE — 97110 THERAPEUTIC EXERCISES: CPT | Mod: PN | Performed by: PHYSICAL THERAPIST

## 2023-06-01 PROCEDURE — 97530 THERAPEUTIC ACTIVITIES: CPT | Mod: PN | Performed by: PHYSICAL THERAPIST

## 2023-06-06 NOTE — PROGRESS NOTES
[facility]   Physical Therapy Progress and Treatment Note     Name: Babar Casper  Clinic Number: [id number]    Therapy Diagnosis:   Encounter Diagnoses   Name Primary?    Chronic bilateral low back pain with right-sided sciatica Yes    Muscle weakness      Physician: Raj Radford, *    Visit Date: 6/7/2023    Evaluation Date: 5/19/2023  Authorization Period Expiration: 11-16-23  Plan of Care Expiration: 7-14-23  Progress Note Due: 6-7-23   Visit # / Visits authorized: 6/9  FOTO: Issued Visit #: 1     MD Follow up appointment: 6-7-23     Precautions: Standard       PTA Visit #: 0/5     Time In: 7:00  Time Out: 7:51  Total Appointment Time (timed & untimed codes): 51 minutes     SUBJECTIVE     Pt reports: pt still gets increased pain with sitting activities such as sitting on toilet, riding in car and having increased pain during day, Pt states ex help but pain returns .   Pt with increased pain with cooking and tried to do more standing and was having pain chopping on cutting board and pain got to 7/10  Pt was compliant with home exercise program.  Response to previous treatment: felt better  Functional change: able to sit with less pain in erect posture, walking or lying down during day    Pain: 3/10  at worst 7/10 with cooking at best 0/10  at end of session 0-1/10  Initial evalCurrent 4/10, worst 6/10, best 0/10    Location: right LB    OBJECTIVE     Functional assessment:   - walking: continues with some cautiousness with gait at IE decreased trunk mobility noted with gait   - sit to stand: no difficulty at IE mod difficulty  - sit to supine: no difficulty at IE min difficulty       - supine to sit: min difficulty  - supine to prone: min difficulty    Lumbar active range of motion in standing is: 6-7-23  - flexion - not tested due to maintaining ext principles                     - extension -  100%                         - left side bending -  mid thigh  did not push due  "to ext principles       - right side bending -  mid thigh did not push due to ext principles             Lumbar active range of motion in standing is: initial eval  - flexion - toes                     - extension -  75%                         - left side bending -  to knee         - right side bending -  to knee           Pelvic positioning: pt able to self mobilizeAR R        Muscle Strength 6-7-23  MMT R L   Hip flexion 4-/5 4/5   Hip abduction 4+/5 4+/5   Hip extension 4+/5 4+/5   Ankle dorsiflexion 5/5 5/5   EHL 5/5 5/5   Ankle eversion 5/5 5/5          Muscle Strength initial eval  MMT R L   Hip flexion 3+/5 4/5   Hip abduction 4-/5 4/5   Hip extension 3+/5 4/5   Knee extension 5/5 5/5   Knee flexion 5/5 5/5   Ankle dorsiflexion 5/5 5/5   EHL 5/5 5/5   Ankle eversion 5/5 5/5         Endurance is poor at IE not tested     Lumbar Special tests:  SLR neg     Palpation: continue with TTP lumbar paraspinals, no significant in gluteals/piriformis mod-severe rhomboids/middle trap mod tightness and TTP lumbar paraspinals and R gluteals/piriformis     Joint mobility: min decreased spring at IE mod decreased mobility  No lumbar dysfunction noted at IE FRS L L 3         Treatment     Babar received the treatments listed below:      therapeutic exercises to develop strength, endurance, ROM, posture, and core stabilization for 21 minutes including:  Push/pull x 3  Press up x 10     Instructed pt to decrease strengthening to 5 reps QID to allow further rest between sets    Verbal review of ex today  Arm and leg lift prone x 20  Push/pull x 3  Press up x 10      Pulldown with retraction with YTB x 10  Retraction with YTB x 2/5  ER with YTB x 10    Lumbar mechanical traction was performed to decompress spine and decrease muscle tightness and pain.  Traction was performed static with a load of 60# for 10 minutes.     Pt reported feeling continued soreness after treatment     (NP)Kinesiotape was performed with 10" I strip SI " region and to the lumbo-sacral region for pain relief and support. 3 strips used  Instructed pt in use, care and precautions with tape.       Therapeutic activities were performed to instruct pt further in unloading and maintaining extension principles.  Noted pt FB to  phone and after lying prone on mat pt was asked to sit up and when he did he went into [place] style sitting.  Instructed pt in effect on lumbar spine with these motions  Chon lumbar taping was performed to the lumbar paraspinals to maintain extension and provide biofeedback to avoid flexion activities and maintain extension principles.  Instructed pt in use, care and precautions with tape.      Therapeutic activity performed for 15 min    Patient Education and Home Exercises     Home Exercises Provided and Patient Education Provided     Education provided:   - Instructed pt in increased awareness of symptoms.  Instructed pt in push/pull at onset of increased symptoms.    - consideration lumbar brace for support  - HEP   - exercise in pain free zone       Written Home Exercises Provided:yes. Exercises were reviewed and Babar was able to demonstrate them prior to the end of the session.  Babar demonstrated good  understanding of the education provided. See EMR under Patient Instructions for exercises provided during therapy sessions    ASSESSMENT   Pt continues with symptoms and need for push/pull and press up.  Pt with muscular soreness so will further decrease strengthening reps at each session.  Pt may benefit from lumbar traction.  Pt also may benefit from Givens taping to further enhance extension principles.  Pt will benefit from continued treatment to assist pt with return to work goals as pt continues to work on acute symptoms     Babar Is progressing well towards his goals.   Pt prognosis is Good.     Pt will continue to benefit from skilled outpatient physical therapy to address the goals listed in the initial evaluation, provide  pt/family education and to maximize pt's level of independence in the home and community environment.     Pt's spiritual, cultural and educational needs considered and pt agreeable to plan of care and goals.     Anticipated barriers to physical therapy: none    GOALS:   Short Term Goals:  4 weeks (Progressing, not met)  Increase strength 1/2 muscle grade  Improve postural awareness of pelvis to independently identify dysfunction with min assist from PT  Be able to perform HEP with minimal cueing required     Long Term Goals: 8 weeks (Progressing, not met)  Improve muscle strength 1 muscle grade  Improve muscle strength with MMT to 4+/5 to 5/5  Improve and stabilize proper pelvic positioning  Restore ability to ambulate with normal pain free gait  Walking for ADL and work will be restored without increased pain  Restore ability to stand for ADL and work without increased pain  Restore ability to sit for ADL and work without increased pain  Restore ability to participate in an exercise program for Wellness ;;  Restore ability to perform ADL's and household activities independently and without increased pain  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  Pt understands to call if problem arises before next appt    Progress strength and stab as tolerated  Pt to contact work supervisor and find out what return to work options he has to work in restrictive capacity to avoid re-injury    Selina Dempsey, PT

## 2023-06-07 ENCOUNTER — CLINICAL SUPPORT (OUTPATIENT)
Dept: REHABILITATION | Facility: HOSPITAL | Age: 28
End: 2023-06-07
Payer: OTHER MISCELLANEOUS

## 2023-06-07 ENCOUNTER — OFFICE VISIT (OUTPATIENT)
Dept: URGENT CARE | Facility: CLINIC | Age: 28
End: 2023-06-07
Payer: OTHER MISCELLANEOUS

## 2023-06-07 VITALS
RESPIRATION RATE: 16 BRPM | OXYGEN SATURATION: 97 % | DIASTOLIC BLOOD PRESSURE: 76 MMHG | BODY MASS INDEX: 20.58 KG/M2 | HEART RATE: 51 BPM | TEMPERATURE: 98 F | SYSTOLIC BLOOD PRESSURE: 129 MMHG | HEIGHT: 71 IN | WEIGHT: 147 LBS

## 2023-06-07 DIAGNOSIS — M54.10 RADICULAR PAIN OF LOWER EXTREMITY: ICD-10-CM

## 2023-06-07 DIAGNOSIS — M54.41 CHRONIC BILATERAL LOW BACK PAIN WITH RIGHT-SIDED SCIATICA: Primary | ICD-10-CM

## 2023-06-07 DIAGNOSIS — S39.012D ACUTE MYOFASCIAL STRAIN OF LUMBAR REGION, SUBSEQUENT ENCOUNTER: ICD-10-CM

## 2023-06-07 DIAGNOSIS — M54.50 LUMBOSACRAL PAIN: ICD-10-CM

## 2023-06-07 DIAGNOSIS — M54.9 DORSALGIA, UNSPECIFIED: ICD-10-CM

## 2023-06-07 DIAGNOSIS — M62.81 MUSCLE WEAKNESS: ICD-10-CM

## 2023-06-07 DIAGNOSIS — M54.16 LUMBAR RADICULOPATHY, CHRONIC: ICD-10-CM

## 2023-06-07 DIAGNOSIS — G89.29 CHRONIC BILATERAL LOW BACK PAIN WITH RIGHT-SIDED SCIATICA: Primary | ICD-10-CM

## 2023-06-07 DIAGNOSIS — Z02.6 ENCOUNTER RELATED TO WORKER'S COMPENSATION CLAIM: Primary | ICD-10-CM

## 2023-06-07 PROCEDURE — 99213 PR OFFICE/OUTPT VISIT, EST, LEVL III, 20-29 MIN: ICD-10-PCS | Mod: S$GLB,,, | Performed by: FAMILY MEDICINE

## 2023-06-07 PROCEDURE — 99213 OFFICE O/P EST LOW 20 MIN: CPT | Mod: S$GLB,,, | Performed by: FAMILY MEDICINE

## 2023-06-07 PROCEDURE — 97012 MECHANICAL TRACTION THERAPY: CPT | Mod: PN | Performed by: PHYSICAL THERAPIST

## 2023-06-07 PROCEDURE — 97530 THERAPEUTIC ACTIVITIES: CPT | Mod: PN | Performed by: PHYSICAL THERAPIST

## 2023-06-07 PROCEDURE — 97110 THERAPEUTIC EXERCISES: CPT | Mod: PN | Performed by: PHYSICAL THERAPIST

## 2023-06-07 RX ORDER — METHOCARBAMOL 500 MG/1
500 TABLET, FILM COATED ORAL 2 TIMES DAILY PRN
Qty: 30 TABLET | Refills: 1 | Status: SHIPPED | OUTPATIENT
Start: 2023-06-07

## 2023-06-07 NOTE — PROGRESS NOTES
Subjective:      Patient ID: Babar Casper is a 27 y.o. male.    Chief Complaint: Back Pain    Pt presents to urgent care for a w/c follow up.  He works for [employer].  He states that he has been going to physical therapy, and it is helping.  He is having a lot of pain in his back though.  He states that he thinks that it is just taking a while to heal.  Pain scale- 4 DOI- 4/27/2023.  He has been taking ibuprofen for the pain.  He tries not to take the naproxen.         5/23/2023- Pt returns for work related injury. States his pain has moderately improved but still impeding ability to perform work related functions. Onset of pain mostly caused by sitting for extended periods of time with driving, as well as bending, twisting and flexion/extension. No longer taking rx and using motrin prn. States the two PT sessions he underwent have been greatly successful in helping his sx's. PS 3/10    Back Pain  This is a new problem. The current episode started more than 1 month ago. The problem occurs constantly. The problem is unchanged. The quality of the pain is described as aching. The pain radiates to the left thigh and right thigh. The pain is at a severity of 4/10. The pain is severe. The symptoms are aggravated by bending, sitting and standing. He has tried NSAIDs for the symptoms. The treatment provided mild relief.     Musculoskeletal:  Positive for back pain.   Objective:     Physical Exam     Lumbar back: Spasms and tenderness present. Decreased range of motion.        Back:       Comments: + slump test on right     Assessment:      1. Encounter related to worker's compensation claim    2. Dorsalgia, unspecified    3. Lumbar radiculopathy, chronic    4. Lumbosacral pain    5. Radicular pain of lower extremity    6. Acute myofascial strain of lumbar region, subsequent encounter      Plan:   Has tried up to 4 # at PT but had inc spasms afterwards.   Will keep off at this time.     Medications Ordered This Encounter    Medications    methocarbamoL (ROBAXIN) 500 MG Tab     Sig: Take 1 tablet (500 mg total) by mouth 2 (two) times daily as needed.     Dispense:  30 tablet     Refill:  1     Patient Instructions: Attention not to aggravate affected area, Daily home exercises/warm soaks, Referral to specialist to be scheduled, once authorized   Restrictions:  (continue current work status.)  No follow-ups on file.

## 2023-06-07 NOTE — PLAN OF CARE
[facility]   Physical Therapy Progress and Treatment Note     Name: Babar Casper  Clinic Number: [id number]    Therapy Diagnosis:   Encounter Diagnoses   Name Primary?    Chronic bilateral low back pain with right-sided sciatica Yes    Muscle weakness      Physician: Raj Radford, *    Visit Date: 6/7/2023    Evaluation Date: 5/19/2023  Authorization Period Expiration: 11-16-23  Plan of Care Expiration: 7-14-23  Progress Note Due: 6-7-23   Visit # / Visits authorized: 6/9  FOTO: Issued Visit #: 1     MD Follow up appointment: 6-7-23     Precautions: Standard       PTA Visit #: 0/5     Time In: 7:00  Time Out: 7:51  Total Appointment Time (timed & untimed codes): 51 minutes     SUBJECTIVE     Pt reports: pt still gets increased pain with sitting activities such as sitting on toilet, riding in car and having increased pain during day, Pt states ex help but pain returns .   Pt with increased pain with cooking and tried to do more standing and was having pain chopping on cutting board and pain got to 7/10  Pt was compliant with home exercise program.  Response to previous treatment: felt better  Functional change: able to sit with less pain in erect posture, walking or lying down during day    Pain: 3/10  at worst 7/10 with cooking at best 0/10  at end of session 0-1/10  Initial evalCurrent 4/10, worst 6/10, best 0/10    Location: right LB    OBJECTIVE     Functional assessment:   - walking: continues with some cautiousness with gait at IE decreased trunk mobility noted with gait   - sit to stand: no difficulty at IE mod difficulty  - sit to supine: no difficulty at IE min difficulty       - supine to sit: min difficulty  - supine to prone: min difficulty    Lumbar active range of motion in standing is: 6-7-23  - flexion - not tested due to maintaining ext principles                     - extension -  100%                         - left side bending -  mid thigh  did not push due  "to ext principles       - right side bending -  mid thigh did not push due to ext principles             Lumbar active range of motion in standing is: initial eval  - flexion - toes                     - extension -  75%                         - left side bending -  to knee         - right side bending -  to knee           Pelvic positioning: pt able to self mobilizeAR R        Muscle Strength 6-7-23  MMT R L   Hip flexion 4-/5 4/5   Hip abduction 4+/5 4+/5   Hip extension 4+/5 4+/5   Ankle dorsiflexion 5/5 5/5   EHL 5/5 5/5   Ankle eversion 5/5 5/5          Muscle Strength initial eval  MMT R L   Hip flexion 3+/5 4/5   Hip abduction 4-/5 4/5   Hip extension 3+/5 4/5   Knee extension 5/5 5/5   Knee flexion 5/5 5/5   Ankle dorsiflexion 5/5 5/5   EHL 5/5 5/5   Ankle eversion 5/5 5/5         Endurance is poor at IE not tested     Lumbar Special tests:  SLR neg     Palpation: continue with TTP lumbar paraspinals, no significant in gluteals/piriformis mod-severe rhomboids/middle trap mod tightness and TTP lumbar paraspinals and R gluteals/piriformis     Joint mobility: min decreased spring at IE mod decreased mobility  No lumbar dysfunction noted at IE FRS L L 3         Treatment     Babar received the treatments listed below:      therapeutic exercises to develop strength, endurance, ROM, posture, and core stabilization for 21 minutes including:  Push/pull x 3  Press up x 10     Instructed pt to decrease strengthening to 5 reps QID to allow further rest between sets    Verbal review of ex today  Arm and leg lift prone x 20  Push/pull x 3  Press up x 10      Pulldown with retraction with YTB x 10  Retraction with YTB x 2/5  ER with YTB x 10    Lumbar mechanical traction was performed to decompress spine and decrease muscle tightness and pain.  Traction was performed static with a load of 60# for 10 minutes.     Pt reported feeling continued soreness after treatment     (NP)Kinesiotape was performed with 10" I strip SI " region and to the lumbo-sacral region for pain relief and support. 3 strips used  Instructed pt in use, care and precautions with tape.       Therapeutic activities were performed to instruct pt further in unloading and maintaining extension principles.  Noted pt FB to  phone and after lying prone on mat pt was asked to sit up and when he did he went into [place] style sitting.  Instructed pt in effect on lumbar spine with these motions  Chon lumbar taping was performed to the lumbar paraspinals to maintain extension and provide biofeedback to avoid flexion activities and maintain extension principles.  Instructed pt in use, care and precautions with tape.      Therapeutic activity performed for 15 min    Patient Education and Home Exercises     Home Exercises Provided and Patient Education Provided     Education provided:   - Instructed pt in increased awareness of symptoms.  Instructed pt in push/pull at onset of increased symptoms.    - consideration lumbar brace for support  - HEP   - exercise in pain free zone       Written Home Exercises Provided:yes. Exercises were reviewed and Babar was able to demonstrate them prior to the end of the session.  Babar demonstrated good  understanding of the education provided. See EMR under Patient Instructions for exercises provided during therapy sessions    ASSESSMENT   Pt continues with symptoms and need for push/pull and press up.  Pt with muscular soreness so will further decrease strengthening reps at each session.  Pt may benefit from lumbar traction.  Pt also may benefit from Givens taping to further enhance extension principles.  Pt will benefit from continued treatment to assist pt with return to work goals as pt continues to work on acute symptoms     Babar Is progressing well towards his goals.   Pt prognosis is Good.     Pt will continue to benefit from skilled outpatient physical therapy to address the goals listed in the initial evaluation, provide  pt/family education and to maximize pt's level of independence in the home and community environment.     Pt's spiritual, cultural and educational needs considered and pt agreeable to plan of care and goals.     Anticipated barriers to physical therapy: none    GOALS:   Short Term Goals:  4 weeks (Progressing, not met)  Increase strength 1/2 muscle grade  Improve postural awareness of pelvis to independently identify dysfunction with min assist from PT  Be able to perform HEP with minimal cueing required     Long Term Goals: 8 weeks (Progressing, not met)  Improve muscle strength 1 muscle grade  Improve muscle strength with MMT to 4+/5 to 5/5  Improve and stabilize proper pelvic positioning  Restore ability to ambulate with normal pain free gait  Walking for ADL and work will be restored without increased pain  Restore ability to stand for ADL and work without increased pain  Restore ability to sit for ADL and work without increased pain  Restore ability to participate in an exercise program for Wellness ;;  Restore ability to perform ADL's and household activities independently and without increased pain  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  Pt understands to call if problem arises before next appt    Progress strength and stab as tolerated  Pt to contact work supervisor and find out what return to work options he has to work in restrictive capacity to avoid re-injury    Selina Dempsey, PT

## 2023-06-07 NOTE — LETTER
[facility] [place] [address], [address]  [place] [address]  Phone: [phone]  Fax: [phone]  [facility]: [email or web address]    Pt Name: Babar Casper  Injury Date: 07/02/2018   Employee ID: [id number] Date ofTreatment: 06/07/2023   Company: [employer]      Appointment Time: 08:15 AM Arrived: 815   Provider: Raj Radford MD Time Out:830     Office Treatment:   1. Encounter related to worker's compensation claim    2. Dorsalgia, unspecified    3. Lumbar radiculopathy, chronic    4. Lumbosacral pain    5. Radicular pain of lower extremity    6. Acute myofascial strain of lumbar region, subsequent encounter          Patient Instructions: Attention not to aggravate affected area, Daily home exercises/warm soaks, Referral to specialist to be scheduled, once authorized    Restrictions:  (continue current work status.)     Return Appointment: 7/6 or sooner if needed

## 2023-06-08 NOTE — PROGRESS NOTES
[facility]   Physical Therapy Treatment Note     Name: Babar Casper  Clinic Number: [id number]    Therapy Diagnosis:   Encounter Diagnoses   Name Primary?    Chronic bilateral low back pain with right-sided sciatica Yes    Muscle weakness      Physician: Raj Radford, *    Visit Date: 6/9/2023    Evaluation Date: 5/19/2023  Authorization Period Expiration: 11-16-23  Plan of Care Expiration: 7-14-23  Progress Note Due: 6-16-23   Visit # / Visits authorized: 7/9  FOTO: Issued Visit #: 1     MD Follow up appointment: 6-16-23     Precautions: Standard       PTA Visit #: 0/5     Time In: 7:22  Time Out: 8:10  Total Appointment Time (timed & untimed codes): 38 direct minutes     SUBJECTIVE     Pt reports: while tape on the back felt better, some shooting leg pain to mid thigh at times after traction which resolved by AM  Tape off last night and this AM bent forward slightly and felt back pain and still feeling a little sore    Pt was compliant with home exercise program.  Response to previous treatment: felt better  Functional change: able to sit with less pain in erect posture, walking or lying down during day has cushion for car and is improved.      Pain: with tape on 0/10  current 2/10   at end of session 1/10 in lower thoracic region describing as a soreness  Initial evalCurrent 4/10, worst 6/10, best 0/10    Location: right LB    OBJECTIVE     AR R pelvis,     Min-mod tightness paraspinals      Treatment     Babar received the treatments listed below:      therapeutic exercises to develop strength, endurance, ROM, posture, and core stabilization for 8 direct minutes including:  Instructed pt in spine and pelvic girdle anatomy and biomechanics and effect of exercise to promote normal positioning, motion and to decrease pain.    Showed pt mechanics of spine as relates to disc    Push/pull x 3  Press up x 10   Pt still with some soreness in lower thoracic paraspinals.  1/10 in  "standing.  Instructed pt to monitor soreness and if continue may need to decrease resp with strengthening further.      Instructed pt to decrease strengthening to 5 reps QID to allow further rest between sets    Verbal review of ex today  Arm and leg lift prone x 20  Push/pull x 3  Press up x 10      Pulldown with retraction with YTB x 10  Retraction with YTB x 2/5  ER with YTB x 10    Manual therapy techniques were performed to improve joint and soft tissue mobility, decrease muscle tightness and pain to lumbar and thoracic paraspinals and lumbar spine for 10 minutes.    STM to lumbar and thoracic paraspinals and P/A mob lumbar spine       HOLD due to increased symptoms Lumbar mechanical traction was performed to decompress spine and decrease muscle tightness and pain.  Traction was performed static with a load of 60# for 10 minutes.     Pt reported feeling continued soreness after treatment     (NP)Kinesiotape was performed with 10" I strip SI region and to the lumbo-sacral region for pain relief and support. 3 strips used  Instructed pt in use, care and precautions with tape.       Therapeutic activities were performed to instruct pt further in unloading and maintaining extension principles.  Instructed pt further in unloading to assist in healing process.  Review of car seat and is still a little low due to severe angle of bottom car seat.  Instructed pt to add a small towel under cushion to provide more level support of hip to knee alignment and pt still has a little height left in cab for head height   Givens lumbar taping was performed to the lumbar paraspinals to maintain extension and provide biofeedback to avoid flexion activities and maintain extension principles.  Instructed pt in use, care and precautions with tape.      Therapeutic activity performed for 20 min    Patient Education and Home Exercises     Home Exercises Provided and Patient Education Provided     Education provided:   - Instructed pt " in increased awareness of symptoms.  Instructed pt in push/pull at onset of increased symptoms.    - consideration lumbar brace for support  - HEP   - exercise in pain free zone       Written Home Exercises Provided:yes. Exercises were reviewed and Babar was able to demonstrate them prior to the end of the session.  Babar demonstrated good  understanding of the education provided. See EMR under Patient Instructions for exercises provided during therapy sessions    ASSESSMENT   Pt had a + response to taping, but still very limited with FB as noted by pain with forgetting about maintaining ext and doing slight FB. Pt guillaume treatment well and decreased pain at end of session. Pt appears to understand unloading and modifying strengthening as needed.  Pt may benefit from slightly further increased lift to car seat cushion.  Pt understands to continue to maintain extension principles.  Pt with min mod tightness with decreased tightness after manual therapy, but still with some soreness in standing and will monitor.      Babar Is progressing well towards his goals.   Pt prognosis is Good.     Pt will continue to benefit from skilled outpatient physical therapy to address the goals listed in the initial evaluation, provide pt/family education and to maximize pt's level of independence in the home and community environment.     Pt's spiritual, cultural and educational needs considered and pt agreeable to plan of care and goals.     Anticipated barriers to physical therapy: none    GOALS:   Short Term Goals:  4 weeks (Progressing, not met)  Increase strength 1/2 muscle grade  Improve postural awareness of pelvis to independently identify dysfunction with min assist from PT  Be able to perform HEP with minimal cueing required     Long Term Goals: 8 weeks (Progressing, not met)  Improve muscle strength 1 muscle grade  Improve muscle strength with MMT to 4+/5 to 5/5  Improve and stabilize proper pelvic positioning  Restore ability to  ambulate with normal pain free gait  Walking for ADL and work will be restored without increased pain  Restore ability to stand for ADL and work without increased pain  Restore ability to sit for ADL and work without increased pain  Restore ability to participate in an exercise program for Wellness ;;  Restore ability to perform ADL's and household activities independently and without increased pain  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  Pt understands to call if problem arises before next appt    Progress strength and stab as tolerated  Pt to contact work supervisor and find out what return to work options he has to work in restrictive capacity to avoid re-injury    Selina Dempsey, PT

## 2023-06-09 ENCOUNTER — CLINICAL SUPPORT (OUTPATIENT)
Dept: REHABILITATION | Facility: HOSPITAL | Age: 28
End: 2023-06-09
Payer: OTHER MISCELLANEOUS

## 2023-06-09 DIAGNOSIS — M62.81 MUSCLE WEAKNESS: ICD-10-CM

## 2023-06-09 DIAGNOSIS — G89.29 CHRONIC BILATERAL LOW BACK PAIN WITH RIGHT-SIDED SCIATICA: Primary | ICD-10-CM

## 2023-06-09 DIAGNOSIS — M54.41 CHRONIC BILATERAL LOW BACK PAIN WITH RIGHT-SIDED SCIATICA: Primary | ICD-10-CM

## 2023-06-09 PROCEDURE — 97140 MANUAL THERAPY 1/> REGIONS: CPT | Mod: PN | Performed by: PHYSICAL THERAPIST

## 2023-06-09 PROCEDURE — 97110 THERAPEUTIC EXERCISES: CPT | Mod: PN | Performed by: PHYSICAL THERAPIST

## 2023-06-09 PROCEDURE — 97530 THERAPEUTIC ACTIVITIES: CPT | Mod: PN | Performed by: PHYSICAL THERAPIST

## 2023-06-12 NOTE — PROGRESS NOTES
[facility] OUTPATIENT THERAPY AND WELLNESS   Physical Therapy Treatment Note     Name: Babar Casper  Clinic Number: [id number]    Therapy Diagnosis:   Encounter Diagnoses   Name Primary?    Chronic bilateral low back pain with right-sided sciatica Yes    Muscle weakness      Physician: Raj Radford, *    Visit Date: 6/13/2023    Evaluation Date: 5/19/2023  Authorization Period Expiration: 11-16-23  Plan of Care Expiration: 7-14-23  Progress Note Due: 6-16-23   Visit # / Visits authorized: 8/9  FOTO: Issued Visit #: 1     MD Follow up appointment: 6-16-23     Precautions: Standard       PTA Visit #: 0/5     Time In: 7:53  Time Out: 8:30 then pt returned after meeting with pain management for taping 11:00-11:05    Total Appointment Time (timed & untimed codes): 28 direct minutes     SUBJECTIVE     Pt reports: overall did fine. Pt states highest was 6/10 when doing dishes, worked on avoided FB, but heavier pots were a difficulty Pt states ex did help. Did lift a package that he thought was lighter than it was and aggravated back Pain level got up to 4/10 but able to resolve symptoms   Tape was removed yesterday  Pt was compliant with home exercise program.  Response to previous treatment: felt better  Functional change: able to sit with less pain in erect posture, walking or lying down during day has cushion for car and is improved.      Pain: with tape on 0/10  current 5/10   at end of session 0/10 in lower back and 2/10 in R rib  Initial evalCurrent 4/10, worst 6/10, best 0/10    Location: right LB    OBJECTIVE     AR R pelvis,     Min-mod tightness paraspinals      Treatment     Babar received the treatments listed below:      therapeutic exercises to develop strength, endurance, ROM, posture, and core stabilization for 13 direct minutes including:  Instructed pt in spine and pelvic girdle anatomy and biomechanics and effect of exercise to promote normal positioning, motion and to decrease pain.    Showed pt  "mechanics of spine as relates to disc    Push/pull x 3  Press up x 10    arm and leg lift to separate exercises and will monitor for aggravation of symptoms    Arm lift prone x 10   Leg lift prone x 10  (NP)Arm and leg lift prone x 20  Push/pull x 3  Press up x 10      Pulldown with retraction with YTB x 10  Retraction with YTB x 2/5  ER with YTB x 10    (NP)Manual therapy techniques were performed to improve joint and soft tissue mobility, decrease muscle tightness and pain to lumbar and thoracic paraspinals and lumbar spine for 0 minutes.    STM to lumbar and thoracic paraspinals and P/A mob lumbar spine       HOLD due to increased symptoms Lumbar mechanical traction was performed to decompress spine and decrease muscle tightness and pain.  Traction was performed static with a load of 60# for 10 minutes.     Pt reported feeling continued soreness after treatment     (NP)Kinesiotape was performed with 10" I strip SI region and to the lumbo-sacral region for pain relief and support. 3 strips used  Instructed pt in use, care and precautions with tape.       Therapeutic activities were performed to instruct pt further in unloading and maintaining extension principles. Discussed further extension principles and keeping pain level down to minimum level and need to avoid pain level as high as 6/10 with ADL.  Instructed pt in BB standing Givens lumbar taping was performed to the lumbar paraspinals to maintain extension and provide biofeedback to avoid flexion activities and maintain extension principles.  Instructed pt in use, care and precautions with tape.      Therapeutic activity performed for 15 min    Patient Education and Home Exercises     Home Exercises Provided and Patient Education Provided     Education provided:   - Instructed pt in increased awareness of symptoms.  Instructed pt in push/pull at onset of increased symptoms.    - consideration lumbar brace for support  - HEP   - exercise in pain " free zone       Written Home Exercises Provided:yes. Exercises were reviewed and Babar was able to demonstrate them prior to the end of the session.  Babar demonstrated good  understanding of the education provided. See EMR under Patient Instructions for exercises provided during therapy sessions    ASSESSMENT   Pt had a + response to taping, but still very limited with FB as noted by pain with forgetting about maintaining ext and doing slight FB. Pt guillaume treatment well and decreased pain at end of session. Pt appears to understand unloading and modifying strengthening as needed.  Pt may benefit from slightly further increased lift to car seat cushion.  Pt understands to continue to maintain extension principles.  Pt with min mod tightness with decreased tightness after manual therapy, but still with some soreness in standing and will monitor.      Babar Is progressing well towards his goals.   Pt prognosis is Good.     Pt will continue to benefit from skilled outpatient physical therapy to address the goals listed in the initial evaluation, provide pt/family education and to maximize pt's level of independence in the home and community environment.     Pt's spiritual, cultural and educational needs considered and pt agreeable to plan of care and goals.     Anticipated barriers to physical therapy: none    GOALS:   Short Term Goals:  4 weeks (Progressing, not met)  Increase strength 1/2 muscle grade  Improve postural awareness of pelvis to independently identify dysfunction with min assist from PT  Be able to perform HEP with minimal cueing required     Long Term Goals: 8 weeks (Progressing, not met)  Improve muscle strength 1 muscle grade  Improve muscle strength with MMT to 4+/5 to 5/5  Improve and stabilize proper pelvic positioning  Restore ability to ambulate with normal pain free gait  Walking for ADL and work will be restored without increased pain  Restore ability to stand for ADL and work without increased  pain  Restore ability to sit for ADL and work without increased pain  Restore ability to participate in an exercise program for Wellness ;;  Restore ability to perform ADL's and household activities independently and without increased pain  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  Pt understands to call if problem arises before next appt    Progress strength and stab as tolerated  Pt to contact work supervisor and find out what return to work options he has to work in restrictive capacity to avoid re-injury    Selina Dempsey, PT

## 2023-06-13 ENCOUNTER — OFFICE VISIT (OUTPATIENT)
Dept: PAIN MEDICINE | Facility: CLINIC | Age: 28
End: 2023-06-13
Payer: OTHER MISCELLANEOUS

## 2023-06-13 ENCOUNTER — CLINICAL SUPPORT (OUTPATIENT)
Dept: REHABILITATION | Facility: HOSPITAL | Age: 28
End: 2023-06-13
Payer: OTHER MISCELLANEOUS

## 2023-06-13 ENCOUNTER — TELEPHONE (OUTPATIENT)
Dept: PAIN MEDICINE | Facility: CLINIC | Age: 28
End: 2023-06-13
Payer: COMMERCIAL

## 2023-06-13 VITALS
SYSTOLIC BLOOD PRESSURE: 132 MMHG | HEIGHT: 71 IN | DIASTOLIC BLOOD PRESSURE: 74 MMHG | BODY MASS INDEX: 20.49 KG/M2 | HEART RATE: 47 BPM | WEIGHT: 146.38 LBS

## 2023-06-13 DIAGNOSIS — G89.29 CHRONIC BILATERAL LOW BACK PAIN WITH RIGHT-SIDED SCIATICA: Primary | ICD-10-CM

## 2023-06-13 DIAGNOSIS — M54.41 CHRONIC BILATERAL LOW BACK PAIN WITH RIGHT-SIDED SCIATICA: Primary | ICD-10-CM

## 2023-06-13 DIAGNOSIS — M47.816 LUMBAR SPONDYLOSIS: Primary | ICD-10-CM

## 2023-06-13 DIAGNOSIS — M62.81 MUSCLE WEAKNESS: ICD-10-CM

## 2023-06-13 DIAGNOSIS — M54.50 ACUTE BILATERAL LOW BACK PAIN WITHOUT SCIATICA: ICD-10-CM

## 2023-06-13 PROCEDURE — 99204 OFFICE O/P NEW MOD 45 MIN: CPT | Mod: S$GLB,,, | Performed by: PHYSICIAN ASSISTANT

## 2023-06-13 PROCEDURE — 97530 THERAPEUTIC ACTIVITIES: CPT | Mod: PN | Performed by: PHYSICAL THERAPIST

## 2023-06-13 PROCEDURE — 97110 THERAPEUTIC EXERCISES: CPT | Mod: PN | Performed by: PHYSICAL THERAPIST

## 2023-06-13 PROCEDURE — 99999 PR PBB SHADOW E&M-EST. PATIENT-LVL III: ICD-10-PCS | Mod: PBBFAC,,, | Performed by: PHYSICIAN ASSISTANT

## 2023-06-13 PROCEDURE — 99204 PR OFFICE/OUTPT VISIT, NEW, LEVL IV, 45-59 MIN: ICD-10-PCS | Mod: S$GLB,,, | Performed by: PHYSICIAN ASSISTANT

## 2023-06-13 PROCEDURE — 99999 PR PBB SHADOW E&M-EST. PATIENT-LVL III: CPT | Mod: PBBFAC,,, | Performed by: PHYSICIAN ASSISTANT

## 2023-06-13 NOTE — PROGRESS NOTES
[facility] New Patient Evaluation      Referred by: Raj Radford MD    PCP: none    CC:   Chief Complaint   Patient presents with    Low-back Pain     4-27-23 injury at work.      Workers Comp Evaluation    HPI:   Babar Casper is a 27 y.o. year old male patient who is relatively healthy.   He presents in referral from Raj Radford MD for back pain.  He works at [employer].  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  He was treated with nsaids, heat, lidocaine and referred to PT.  By December 2022 he was doing better and released to return to work.  On 4-28-23 he lifted weights early in the day then carried an animal crate at work later in the day.  He had sudden on set back pain and was evaluated in the ER.  Has since been followed by occupational healthy.  He ha pain I nteh lower back and buttocks.  Pain is constant and on bad days pain does radiate to the superior half of the right thigh.  He has tried multiple medications and PT.  He has seen some slow steady improvement, but pain is still present.     Denies bowel/ bladder incontinence.    Past and current medications:  Antineuropathics:  NSAIDs:  naproxen (tried IM toradol)  Antidepressants:  Muscle relaxers:  robaxin  Opioids: (tried norco)  Antiplatelets/Anticoagulants:  Others:  (tried prednisone)    Physical Therapy/ Chiropractic care:  PT in November 2022.  Current PT in May 2023. 7 visits thus far.    Pain Intervention History:  none    Past Spine Surgical History:  none        History:    Current Outpatient Medications:     methocarbamoL (ROBAXIN) 500 MG Tab, Take 1 tablet (500 mg total) by mouth 2 (two) times daily as needed., Disp: 30 tablet, Rfl: 1    multivitamin capsule, Take 1 capsule by mouth once daily., Disp: , Rfl:     naproxen (NAPROSYN) 500 MG tablet, Take 1 tablet (500 mg total) by mouth 2 (two) times daily with meals.  "(Patient taking differently: Take 500 mg by mouth as needed.), Disp: 30 tablet, Rfl: 0    History reviewed. No pertinent past medical history.    History reviewed. No pertinent surgical history.    Family History   Problem Relation Age of Onset    No Known Problems Mother     Depression Father     Depression Brother        Social History     Socioeconomic History    Marital status: Single   Tobacco Use    Smoking status: Former     Types: Cigarettes    Smokeless tobacco: Never   Substance and Sexual Activity    Alcohol use: Yes       Review of patient's allergies indicates:  No Known Allergies    Labs:  No results found for: LABA1C, HGBA1C    Lab Results   Component Value Date    WBC 4.75 03/24/2021    HGB 17.4 03/24/2021    HCT 51.4 03/24/2021    MCV 86 03/24/2021     03/24/2021           Review of Systems:  Low back pain. .  Balance of review of systems is negative.    Physical Exam:  Vitals:    06/13/23 0934   BP: 132/74   Pulse: (!) 47   Weight: 66.4 kg (146 lb 6.2 oz)   Height: 5' 11" (1.803 m)   PainSc:   4   PainLoc: Back     Body mass index is 20.42 kg/m².    Gen: NAD  Psych: mood appropriate for given condition  HEENT: eyes anicteric   CV: RRR, 2+ radial pulse  HEENT: anicteric   Respiratory: non-labored, no signs of respiratory distress  Abd: non-distended  Skin: warm, dry and intact.  Gait: Able to heel walk, toe walk. No antalgic gait.     Coordination:   Romberg: negative  Finger to nose coordination: normal  Heel to shin coordination: normal  Tandem walking coordination: normal    Cervical spine: ROM is full in flexion, extension and lateral rotation without increased pain.  Spurling's maneuver causes no neck pain to either side.  Myofascial exam: No Tenderness to palpation across cervical paraspinous region bilaterally.    Lumbar spine:  Lumbar spine: ROM is full with flexion extension and oblique extension with no increased pain.    Arie's test causes no increased pain on either side.  "   Supine straight leg raise is negative bilaterally.    Internal and external rotation of the hip causes no increased pain on either side.  Myofascial exam: No tenderness to palpation across lumbar paraspinous muscles. No tenderness to palpation over the bilateral greater trochanters and bilateral SI joint    Sensory:  Intact and symmetrical to light touch in C4-T1 dermatomes bilaterally. Intact and symmetrical to light touch in L1-S1 dermatomes bilaterally.    Motor:    Right Left   C4 Shoulder Abduction  5  5   C5 Elbow Flexion    5  5   C6 Wrist Extension  5  5   C7 Elbow Extension   5  5   C8/T1 Hand Intrinsics   5  5        Right Left   L2/3 Iliacus Hip flexion  5  5   L3/4 Qudratus Femoris Knee Extension  5  5   L4/5 Tib Anterior Ankle Dorsiflexion   5  5   L5/S1 Extensor Hallicus Longus Great toe extension  5  5   S1/S2 Gastroc/Soleus Plantar Flexion  5  5      Right Left   Triceps DTR 2+ 2+   Biceps DTR 2+ 2+   Brachioradialis DTR 2+ 2+   Patellar DTR 2+ 2+   Achilles DTR 2+ 2+   Chapa Absent  Absent   Clonus Absent Absent   Babinski Absent Absent       Imaging:    Xray lumbar spine AP and lateral 4-28-23:  Straightening of the lumbar lordosis and right-sided curvature of the lumbar spine could be positional or associated with muscle spasm.  Otherwise no acute abnormality identified.    MRi lumbar spine 5-16-23:  L4/5 disk height loss, central disk herna with slight caudal migration causing moderate bilateral formainal narrowing and mild central canal narrowing.  L5/S1 degenerative disk dessication with mild foraminal narrowing.       Assessment:   Babar Casper is a 27 y.o. year old male patient who has no past medical history on file. He presents in referral from Dr. Raj Radford for back pain.  Pain can be myofascial and he is seing improvement from PT and HEP.  Cannot rule out discogenic pain from L4/5 disk hernia.      Problem List Items Addressed This Visit    None  Visit Diagnoses        Lumbar spondylosis    -  Primary    Acute bilateral low back pain without sciatica                Plan:  - we discussed medications, PT and L4/5 KENISHA.  - KENISHA could possible help with discogenic back pain, but will not chagne the disk hernia in and of itself; PT could push the disk hernia back into place with more time  - he would like to try further PT and time.  He will call should pain not continue to improve.      Follow Up: RTC as needed.       : Viewed.     Thank you for referring this interesting patient, and I look forward to continuing to collaborate in his care.        Jelena Troncoso PA-C  [facility]

## 2023-06-13 NOTE — TELEPHONE ENCOUNTER
I spoke with Mr. Casper and he has been scheduled to see Jelena Troncoso today, he indicated understanding.

## 2023-06-14 NOTE — PROGRESS NOTES
[facility]   Physical Therapy Progress and Treatment Note     Name: Babar Casper  Clinic Number: [id number]    Therapy Diagnosis:   Encounter Diagnoses   Name Primary?    Chronic bilateral low back pain with right-sided sciatica Yes    Muscle weakness      Physician: Raj Radford, *    Visit Date: 6/15/2023    Evaluation Date: 5/19/2023  Authorization Period Expiration: 11-16-23  Plan of Care Expiration: 7-14-23  Progress Note Due: 6-16-23   Visit # / Visits authorized: 9/9  FOTO: Issued Visit #: 1     MD Follow up appointment: 6-16-23     Precautions: Standard       PTA Visit #: 0/5     Time In: 7:48  Time Out: 8:35   Total Appointment Time (timed & untimed codes): 47 minutes     SUBJECTIVE     Pt reports: generally keeping pain level at 0 and have been standing, lying down and walking avoiding sitting and FB activities.  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 No issues with modification to strengthening   Response to previous treatment: felt better  Functional change walking or lying down during day has cushion for car and is improved.      Pain: with tape on 0/10  current 5/10   at end of session 0/10 in lower back and 2/10 in R rib  Initial evalCurrent 4/10, worst 6/10, best 0/10    Location: right LB    OBJECTIVE     AR R pelvis,     Min-mod tightness paraspinals     Functional assessment:   - walking: normal gait  at IE decreased trunk mobility noted with gait   - sit to stand: no difficulty at IE mod difficulty  - sit to supine: no difficulty at IE min difficulty       - supine to sit: no difficulty at IE min difficulty  - supine to prone: no difficulty at IE min  difficulty     Lumbar active range of motion in standing is: 6-15-23  - flexion - not tested due to maintaining ext principles                     - extension -  100%                         - left side bending -  mid thigh  did not push due to ext principles       - right side bending -  mid thigh did not push due to ext principles              Lumbar active range of motion in standing is: initial eval  - flexion - toes                     - extension -  75%                         - left side bending -  to knee         - right side bending -  to knee           Pelvic positioning: pt able to self mobilizeAR R         Muscle Strength 6-7-23 not tested on 6-15-23 due to avoiding strain and maintaining extension principles   MMT R L   Hip flexion 4-/5 4/5   Hip abduction 4+/5 4+/5   Hip extension 4+/5 4+/5   Ankle dorsiflexion 5/5 5/5   EHL 5/5 5/5   Ankle eversion 5/5 5/5            Muscle Strength initial eval  MMT R L   Hip flexion 3+/5 4/5   Hip abduction 4-/5 4/5   Hip extension 3+/5 4/5   Knee extension 5/5 5/5   Knee flexion 5/5 5/5   Ankle dorsiflexion 5/5 5/5   EHL 5/5 5/5   Ankle eversion 5/5 5/5         Endurance is poor, but improving at IE not tested   \     Palpation:  min TTP lumbar paraspinals but noted specific area of mod-severe TTP R QL at insertion on ribs, no significant in gluteals/piriformis mod-severe rhomboids/middle trap mod tightness and TTP lumbar paraspinals and R gluteals/piriformis     Joint mobility: min decreased spring at IE mod decreased mobility  No lumbar dysfunction noted at IE FRS L L 3       PT reviewed FOTO scores for Babar Ashfordsarahdarrick   FOTO %limitation: 37 at IE 38   FOTO scores were entered into EPIC - see media section.       Treatment     Babar received the treatments listed below:      therapeutic exercises to develop strength, endurance, ROM, posture, and core stabilization for 17minutes including:  Instructed pt in spine and pelvic girdle anatomy and biomechanics and  effect of exercise to promote normal positioning, motion and to decrease pain.    Showed pt mechanics of spine as relates to disc    Push/pull x 3  Press up x 10    arm and leg lift to separate exercises and will monitor for aggravation of symptoms    Arm lift prone x 10   Leg lift prone x 10  Instructed pt to increase reps by 3 reps each session as long as ok any increase in symptoms go back to previous level.  At 20 reps go back to combination arm and leg and continue with TB exercises   (NP)Arm and leg lift prone x 20  Push/pull x 3  Press up x 10      Pulldown with retraction with YTB x 10  Retraction with YTB x 2/5  ER with YTB x 10    Ultrasound  for pain control and to decrease inflammation @ continuous duty cycle, 1 Mhz, applied to R QL, intensity = 1.5 w/cm2 for 8 minutes.      Manual therapy techniques were performed to improve joint and soft tissue mobility, decrease muscle tightness and pain to lumbar and thoracic paraspinals and lumbar spine for 10 minutes.    STM to lumbar and thoracic paraspinals, R QL and P/A mob lumbar spine         Therapeutic activities were performed for 15 min to instruct pt further in unloading and maintaining extension principles Discussed truck seat further and pt to add towel under bottom of seat and a large towel roll for lumbar support and keep left leg extended while driving      Instructed pt in BB standing and no change in standing after push/pull and press up still 1/10      Chon lumbar taping was performed to the lumbar paraspinals to maintain extension and provide biofeedback to avoid flexion activities and maintain extension principles.  Instructed pt in use, care and precautions with tape.          Patient Education and Home Exercises     Home Exercises Provided and Patient Education Provided     Education provided:   - Instructed pt in increased awareness of symptoms.  Instructed pt in push/pull at onset of increased symptoms.    - consideration  lumbar brace for support  - HEP   - exercise in pain free zone       Written Home Exercises Provided:continue prior HEP with instructions as above Exercises were reviewed and Babar was able to demonstrate them prior to the end of the session.  Babar demonstrated good  understanding of the education provided. See EMR under Patient Instructions for exercises provided during therapy sessions    ASSESSMENT   Pt had a + response to more strict extension principles.  Pt still has dysfunction with riding in truck and appears to understand further modifications that he needs to make to further improve support with driving. Pt will benefit from continued treatment to assist him in full return to work and ADL.      Babar Is progressing well towards his goals.   Pt prognosis is Good.     Pt will continue to benefit from skilled outpatient physical therapy to address the goals listed in the initial evaluation, provide pt/family education and to maximize pt's level of independence in the home and community environment.     Pt's spiritual, cultural and educational needs considered and pt agreeable to plan of care and goals.     Anticipated barriers to physical therapy: none    GOALS:   Short Term Goals:  4 weeks (Progressing, not met)  Increase strength 1/2 muscle grade  Improve postural awareness of pelvis to independently identify dysfunction with min assist from PT  Be able to perform HEP with minimal cueing required     Long Term Goals: 8 weeks (Progressing, not met)  Improve muscle strength 1 muscle grade  Improve muscle strength with MMT to 4+/5 to 5/5  Improve and stabilize proper pelvic positioning  Restore ability to ambulate with normal pain free gait  Walking for ADL and work will be restored without increased pain  Restore ability to stand for ADL and work without increased pain  Restore ability to sit for ADL and work without increased pain  Restore ability to participate in an exercise program for Wellness ;;  Restore  ability to perform ADL's and household activities independently and without increased pain  Restore ability to carry monkeys and cages as needed for work to perform full job duties as  #2       PLAN   If you concur, I recommend patient continue with physical therapy.  Please advise us of your findings and recommendations. Thank you for allowing us to assist in the care of your patient.       Selina Dempsey, PT

## 2023-06-15 ENCOUNTER — CLINICAL SUPPORT (OUTPATIENT)
Dept: REHABILITATION | Facility: HOSPITAL | Age: 28
End: 2023-06-15
Payer: OTHER MISCELLANEOUS

## 2023-06-15 DIAGNOSIS — M62.81 MUSCLE WEAKNESS: ICD-10-CM

## 2023-06-15 DIAGNOSIS — G89.29 CHRONIC BILATERAL LOW BACK PAIN WITH RIGHT-SIDED SCIATICA: Primary | ICD-10-CM

## 2023-06-15 DIAGNOSIS — M54.41 CHRONIC BILATERAL LOW BACK PAIN WITH RIGHT-SIDED SCIATICA: Primary | ICD-10-CM

## 2023-06-15 PROCEDURE — 97530 THERAPEUTIC ACTIVITIES: CPT | Mod: PN | Performed by: PHYSICAL THERAPIST

## 2023-06-15 PROCEDURE — 97110 THERAPEUTIC EXERCISES: CPT | Mod: PN | Performed by: PHYSICAL THERAPIST

## 2023-06-15 PROCEDURE — 97035 APP MDLTY 1+ULTRASOUND EA 15: CPT | Mod: PN | Performed by: PHYSICAL THERAPIST

## 2023-06-15 PROCEDURE — 97140 MANUAL THERAPY 1/> REGIONS: CPT | Mod: PN | Performed by: PHYSICAL THERAPIST

## 2023-06-15 NOTE — PLAN OF CARE
[facility]   Physical Therapy Progress and Treatment Note     Name: Babar Casper  Clinic Number: [id number]    Therapy Diagnosis:   Encounter Diagnoses   Name Primary?    Chronic bilateral low back pain with right-sided sciatica Yes    Muscle weakness      Physician: Raj Radford, *    Visit Date: 6/15/2023    Evaluation Date: 5/19/2023  Authorization Period Expiration: 11-16-23  Plan of Care Expiration: 7-14-23  Progress Note Due: 6-16-23   Visit # / Visits authorized: 9/9  FOTO: Issued Visit #: 1     MD Follow up appointment: 6-16-23     Precautions: Standard       PTA Visit #: 0/5     Time In: 7:48  Time Out: 8:35   Total Appointment Time (timed & untimed codes): 47 minutes     SUBJECTIVE     Pt reports: generally keeping pain level at 0 and have been standing, lying down and walking avoiding sitting and FB activities.  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 No issues with modification to strengthening   Response to previous treatment: felt better  Functional change walking or lying down during day has cushion for car and is improved.      Pain: with tape on 0/10  current 5/10   at end of session 0/10 in lower back and 2/10 in R rib  Initial evalCurrent 4/10, worst 6/10, best 0/10    Location: right LB    OBJECTIVE     AR R pelvis,     Min-mod tightness paraspinals     Functional assessment:   - walking: normal gait  at IE decreased trunk mobility noted with gait   - sit to stand: no difficulty at IE mod difficulty  - sit to supine: no difficulty at IE min difficulty       - supine to sit: no difficulty at IE min difficulty  - supine to prone: no difficulty at IE min  difficulty     Lumbar active range of motion in standing is: 6-15-23  - flexion - not tested due to maintaining ext principles                     - extension -  100%                         - left side bending -  mid thigh  did not push due to ext principles       - right side bending -  mid thigh did not push due to ext principles              Lumbar active range of motion in standing is: initial eval  - flexion - toes                     - extension -  75%                         - left side bending -  to knee         - right side bending -  to knee           Pelvic positioning: pt able to self mobilizeAR R         Muscle Strength 6-7-23 not tested on 6-15-23 due to avoiding strain and maintaining extension principles   MMT R L   Hip flexion 4-/5 4/5   Hip abduction 4+/5 4+/5   Hip extension 4+/5 4+/5   Ankle dorsiflexion 5/5 5/5   EHL 5/5 5/5   Ankle eversion 5/5 5/5            Muscle Strength initial eval  MMT R L   Hip flexion 3+/5 4/5   Hip abduction 4-/5 4/5   Hip extension 3+/5 4/5   Knee extension 5/5 5/5   Knee flexion 5/5 5/5   Ankle dorsiflexion 5/5 5/5   EHL 5/5 5/5   Ankle eversion 5/5 5/5         Endurance is poor, but improving at IE not tested   \     Palpation:  min TTP lumbar paraspinals but noted specific area of mod-severe TTP R QL at insertion on ribs, no significant in gluteals/piriformis mod-severe rhomboids/middle trap mod tightness and TTP lumbar paraspinals and R gluteals/piriformis     Joint mobility: min decreased spring at IE mod decreased mobility  No lumbar dysfunction noted at IE FRS L L 3       PT reviewed FOTO scores for Babar Ashfordsarahdarrick   FOTO %limitation: 37 at IE 38   FOTO scores were entered into EPIC - see media section.       Treatment     Babar received the treatments listed below:      therapeutic exercises to develop strength, endurance, ROM, posture, and core stabilization for 17minutes including:  Instructed pt in spine and pelvic girdle anatomy and biomechanics and  effect of exercise to promote normal positioning, motion and to decrease pain.    Showed pt mechanics of spine as relates to disc    Push/pull x 3  Press up x 10    arm and leg lift to separate exercises and will monitor for aggravation of symptoms    Arm lift prone x 10   Leg lift prone x 10  Instructed pt to increase reps by 3 reps each session as long as ok any increase in symptoms go back to previous level.  At 20 reps go back to combination arm and leg and continue with TB exercises   (NP)Arm and leg lift prone x 20  Push/pull x 3  Press up x 10      Pulldown with retraction with YTB x 10  Retraction with YTB x 2/5  ER with YTB x 10    Ultrasound  for pain control and to decrease inflammation @ continuous duty cycle, 1 Mhz, applied to R QL, intensity = 1.5 w/cm2 for 8 minutes.      Manual therapy techniques were performed to improve joint and soft tissue mobility, decrease muscle tightness and pain to lumbar and thoracic paraspinals and lumbar spine for 10 minutes.    STM to lumbar and thoracic paraspinals, R QL and P/A mob lumbar spine         Therapeutic activities were performed for 15 min to instruct pt further in unloading and maintaining extension principles Discussed truck seat further and pt to add towel under bottom of seat and a large towel roll for lumbar support and keep left leg extended while driving      Instructed pt in BB standing and no change in standing after push/pull and press up still 1/10      Chon lumbar taping was performed to the lumbar paraspinals to maintain extension and provide biofeedback to avoid flexion activities and maintain extension principles.  Instructed pt in use, care and precautions with tape.          Patient Education and Home Exercises     Home Exercises Provided and Patient Education Provided     Education provided:   - Instructed pt in increased awareness of symptoms.  Instructed pt in push/pull at onset of increased symptoms.    - consideration  lumbar brace for support  - HEP   - exercise in pain free zone       Written Home Exercises Provided:continue prior HEP with instructions as above Exercises were reviewed and Babar was able to demonstrate them prior to the end of the session.  Babar demonstrated good  understanding of the education provided. See EMR under Patient Instructions for exercises provided during therapy sessions    ASSESSMENT   Pt had a + response to more strict extension principles.  Pt still has dysfunction with riding in truck and appears to understand further modifications that he needs to make to further improve support with driving. Pt will benefit from continued treatment to assist him in full return to work and ADL.      Babar Is progressing well towards his goals.   Pt prognosis is Good.     Pt will continue to benefit from skilled outpatient physical therapy to address the goals listed in the initial evaluation, provide pt/family education and to maximize pt's level of independence in the home and community environment.     Pt's spiritual, cultural and educational needs considered and pt agreeable to plan of care and goals.     Anticipated barriers to physical therapy: none    GOALS:   Short Term Goals:  4 weeks (Progressing, not met)  Increase strength 1/2 muscle grade  Improve postural awareness of pelvis to independently identify dysfunction with min assist from PT  Be able to perform HEP with minimal cueing required     Long Term Goals: 8 weeks (Progressing, not met)  Improve muscle strength 1 muscle grade  Improve muscle strength with MMT to 4+/5 to 5/5  Improve and stabilize proper pelvic positioning  Restore ability to ambulate with normal pain free gait  Walking for ADL and work will be restored without increased pain  Restore ability to stand for ADL and work without increased pain  Restore ability to sit for ADL and work without increased pain  Restore ability to participate in an exercise program for Wellness ;;  Restore  ability to perform ADL's and household activities independently and without increased pain  Restore ability to carry monkeys and cages as needed for work to perform full job duties as  #2       PLAN   If you concur, I recommend patient continue with physical therapy.  Please advise us of your findings and recommendations. Thank you for allowing us to assist in the care of your patient.       Selina Dempsey, PT

## 2023-06-16 ENCOUNTER — TELEPHONE (OUTPATIENT)
Dept: NEUROLOGY | Facility: CLINIC | Age: 28
End: 2023-06-16
Payer: COMMERCIAL

## 2023-06-16 ENCOUNTER — OFFICE VISIT (OUTPATIENT)
Dept: URGENT CARE | Facility: CLINIC | Age: 28
End: 2023-06-16
Payer: OTHER MISCELLANEOUS

## 2023-06-16 VITALS
BODY MASS INDEX: 20.44 KG/M2 | DIASTOLIC BLOOD PRESSURE: 81 MMHG | HEIGHT: 71 IN | RESPIRATION RATE: 16 BRPM | SYSTOLIC BLOOD PRESSURE: 125 MMHG | WEIGHT: 146 LBS | OXYGEN SATURATION: 97 % | HEART RATE: 84 BPM | TEMPERATURE: 99 F

## 2023-06-16 DIAGNOSIS — Z02.6 ENCOUNTER RELATED TO WORKER'S COMPENSATION CLAIM: Primary | ICD-10-CM

## 2023-06-16 DIAGNOSIS — M54.50 LUMBOSACRAL PAIN: ICD-10-CM

## 2023-06-16 DIAGNOSIS — M51.26 HERNIATED LUMBAR INTERVERTEBRAL DISC: Primary | ICD-10-CM

## 2023-06-16 DIAGNOSIS — M54.16 LUMBAR RADICULOPATHY, CHRONIC: ICD-10-CM

## 2023-06-16 DIAGNOSIS — M54.50 ACUTE BILATERAL LOW BACK PAIN, UNSPECIFIED WHETHER SCIATICA PRESENT: ICD-10-CM

## 2023-06-16 DIAGNOSIS — M54.9 DORSALGIA, UNSPECIFIED: ICD-10-CM

## 2023-06-16 PROCEDURE — 99213 OFFICE O/P EST LOW 20 MIN: CPT | Mod: S$GLB,,, | Performed by: FAMILY MEDICINE

## 2023-06-16 PROCEDURE — 99213 PR OFFICE/OUTPT VISIT, EST, LEVL III, 20-29 MIN: ICD-10-PCS | Mod: S$GLB,,, | Performed by: FAMILY MEDICINE

## 2023-06-16 NOTE — PROGRESS NOTES
Subjective:      Patient ID: Babar Casper is a 27 y.o. male.    Chief Complaint: Back Pain    Pt presents to urgent care for a w/c follow up.  He works for [employer]. He states that his back is still in pain.  Pain scale- 6. He is taking naproxen and robaxin, also ibuprofen when needed.       6/7/2023-Pt presents to urgent care for a w/c follow up.  He works for [employer].  He states that he has been going to physical therapy, and it is helping.  He is having a lot of pain in his back though.  He states that he thinks that it is just taking a while to heal.  Pain scale- 4 DOI- 4/27/2023.  He has been taking ibuprofen for the pain.  He tries not to take the naproxen.     Back Pain  This is a new problem. The current episode started more than 1 month ago. The problem occurs constantly. The problem has been gradually improving since onset. The pain does not radiate. The pain is at a severity of 6/10. The pain is moderate. The symptoms are aggravated by bending, sitting and standing. Treatments tried: muscle relaxers.     Musculoskeletal:  Positive for back pain.   Objective:     Physical Exam     Lumbar back: Spasms and tenderness present. Decreased range of motion.        Back:    Assessment:      1. Encounter related to worker's compensation claim    2. Lumbosacral pain    3. Dorsalgia, unspecified    4. Lumbar radiculopathy, chronic      Plan:     Discussed with patient regarding visit with Pain Management.  Patient states he spoke with his RV and also decided against doing injection this time.  Says the patient dural likely help out this pain which did not lead to improved pain levels, motivation, mood, return to more functionality in life and work.  Patient at her thought about that way and states he would now like to proceed epidural injection.  Advised patient follow-up with pain management for further consultation.  Patient states that he has not advanced on his weight at PT due to pain somewill  havekeep off of work at this time.  Feels that he is about 25% improved with time and physical therapy.    Return in 1 month.   Patient Instructions: Daily home exercises/warm soaks, Attention not to aggravate affected area, PT to be scheduled once authorized, Continue Physical Therapy   Restrictions:  (continue current restrictions)  No follow-ups on file.

## 2023-06-16 NOTE — TELEPHONE ENCOUNTER
----- Message from Tripp Castellano sent at 6/16/2023  1:52 PM CDT -----  Type:  Patient Returning Call    Who Called:  Patient  Who Left Message for Patient:  Meli  Does the patient know what this is regarding?:  Scheduling epidural  Best Call Back Number:  [phone]  Additional Information:

## 2023-06-16 NOTE — TELEPHONE ENCOUNTER
----- Message from Sania Juan sent at 6/16/2023  1:22 PM CDT -----  Contact: pt  Pt is calling and would like a call back   Please give pt a call back [phone]

## 2023-06-16 NOTE — LETTER
[facility] [place] [address], [address]  [place] [address]  Phone: [phone]  Fax: [phone]  [facility] [employer]: [phone]    Pt Name: Babar Casper  Injury Date: 07/02/2018   Employee ID: [id number] Date of Treatment: 06/16/2023   Company: [employer]      Appointment Time: 11:45 AM Arrived: 1145   Provider: Raj Radford MD Time Out:115     Office Treatment:   1. Encounter related to worker's compensation claim    2. Lumbosacral pain    3. Dorsalgia, unspecified    4. Lumbar radiculopathy, chronic          Patient Instructions: Daily home exercises/warm soaks, Attention not to aggravate affected area, PT to be scheduled once authorized, Continue Physical Therapy    Restrictions:  (continue current restrictions)     Return Appointment: 7/12 or sooner if needed

## 2023-06-19 ENCOUNTER — TELEPHONE (OUTPATIENT)
Dept: PAIN MEDICINE | Facility: CLINIC | Age: 28
End: 2023-06-19
Payer: COMMERCIAL

## 2023-06-19 NOTE — TELEPHONE ENCOUNTER
Direct to procedure order placed for L4/5 interlaminar KENISHA.  Will need approval from w/c and pain management will call to schedule for him.

## 2023-06-19 NOTE — TELEPHONE ENCOUNTER
Physician - Dr Sanchez    Type of Procedure/Injection - Lumbar Epidural  L4/5           Laterality - NA      Type of Sedation - RNIV      Need to hold medication - Yes      NSAIDs for 2 days      Clearance needed - No      Follow up - 2 week

## 2023-06-20 NOTE — PROGRESS NOTES
[facility]   Physical Therapy Treatment Note     Name: Babar Casper  Clinic Number: [id number]    Therapy Diagnosis:   Encounter Diagnoses   Name Primary?    Chronic bilateral low back pain with right-sided sciatica Yes    Muscle weakness      Physician: Raj Radford MD    Visit Date: 6/21/2023    Evaluation Date: 5/19/2023  Authorization Period Expiration 6-15-24  Plan of Care Expiration: 7-14-23  Progress Note Due: 7-12-23   Visit # / Visits authorized: 10/18  FOTO: Issued Visit #: 1     MD Follow up appointment: 7-12-23     Precautions: Standard       PTA Visit #: 0/5     Time In: 9:22  Time Out: 10:22  Total Appointment Time (timed & untimed codes): 40 minutes     SUBJECTIVE     Pt reports: last few days have been better.  Pt states he has been resting taking it easy.  Pt states did kitchen a few days ago and took breaks which helped . Pt would lie down and do press ups.  Still no rib pain.     Pt was compliant with home exercise program. No issues with modification to strengthening   Response to previous treatment: felt better  Functional change walking or lying down during day has cushion for car and is improved able to lie on side with more ease      Pain: without tape for a few days and doing well  0/10 at worst the past few days 1-2/10    at end of session 0/10 in lower back and 2/10 in R rib  Initial evalCurrent 4/10, worst 6/10, best 0/10    Location: right LB    OBJECTIVE     Level pelvis at IE AR R pelvis,       Min tightness paraspinals     Treatment     Babar received the treatments listed below:      therapeutic exercises to develop strength, endurance, ROM, posture, and core stabilization for 15 minutes including:    Push/pull x 3  Press up x 10    arm and leg lift to separate exercises and will monitor for aggravation of symptoms    Arm lift prone x 10   Leg lift prone x 10  Instructed pt to increase reps by 3 reps each session as long as ok any  "increase in symptoms go back to previous level.  At 20 reps go back to combination arm and leg and continue with TB exercises   (NP)Arm and leg lift prone x 20    Push/pull x 3  Press up x 10      Pulldown with retraction with YTB x 10  Retraction with YTB x 10  Much cueing to decrease upper trap recruitment  ER with YTB x 20 focus on proper posture     Bicep curls 3# B 2/10 standing    Triceps with YTB sports cord x 2/10 standing  At end pt reported upon asking how he felt and he had R glut pain which he then stated started 2 min prior to PT asking. Instructed pt in increased awareness of symptoms.  Instructed pt in push/pull and press up at onset of increased symptoms reiterating onset of symptoms not 2 min later        (NP)Manual therapy techniques were performed to improve joint and soft tissue mobility, decrease muscle tightness and pain to lumbar and thoracic paraspinals and lumbar spine for 0 minutes.    STM to lumbar and thoracic paraspinals, R QL and P/A mob lumbar spine         Therapeutic activities were performed for 25 min to instruct pt further in unloading and maintaining extension principles pt tried extra towel roll and elevation of seat and led to increased pain so removed and able to drive and get out of car without increased pain     10 reps of lifting 2# dumbbell up and placing on weight stack with 2 foam pads to simulate height pt feels high cage is and no dysfunction.  10 reps lifting 3# dumbbell as above     Pushed computer cart which pt felt simulated cart in clinic at 32" with 5# on cart for 1 min in clinic and instructed in maintaining erect posture rather than leaning forward bending at trunk as pt stated he performed      Pt has 5# dumbbell at home and instructed pt to perform these work sim activities daily when not coming to PT if + response.  Instructed pt in increased awareness of symptoms.  Instructed pt in push/pull and press up at onset of increased symptoms and to stop if any " symptoms occur.        (NP)Chon lumbar taping was performed to the lumbar paraspinals to maintain extension and provide biofeedback to avoid flexion activities and maintain extension principles.  Instructed pt in use, care and precautions with tape.          Patient Education and Home Exercises     Home Exercises Provided and Patient Education Provided     Education provided:   - Instructed pt in increased awareness of symptoms.  Instructed pt in push/pull at onset of increased symptoms.    - consideration lumbar brace for support  - HEP   - exercise in pain free zone       Written Home Exercises Provided:continue prior HEP with instructions as above Exercises were reviewed and Babar was able to demonstrate them prior to the end of the session.  Babar demonstrated good  understanding of the education provided. See EMR under Patient Instructions for exercises provided during therapy sessions    ASSESSMENT   Pt had a + response to more strict extension principles.  Pt now able to ride in truck and transfer without dysfunction.  Pt appears to understand that he still needs to avoid sitting and maintain strict extension principles.  Progressed strengthening slightly and able to progress with very light work simulation activities and will monitor response.  Patient appears to understand increased awareness of symptoms and to perform push/pull and press upat onset of increased symptoms.      Babar Is progressing well towards his goals.   Pt prognosis is Good.     Pt will continue to benefit from skilled outpatient physical therapy to address the goals listed in the initial evaluation, provide pt/family education and to maximize pt's level of independence in the home and community environment.     Pt's spiritual, cultural and educational needs considered and pt agreeable to plan of care and goals.     Anticipated barriers to physical therapy: none    GOALS:   Short Term Goals:  4 weeks (Progressing, not met)  Increase  strength 1/2 muscle grade  Improve postural awareness of pelvis to independently identify dysfunction with min assist from PT  Be able to perform HEP with minimal cueing required     Long Term Goals: 8 weeks (Progressing, not met)  Improve muscle strength 1 muscle grade  Improve muscle strength with MMT to 4+/5 to 5/5  Improve and stabilize proper pelvic positioning  Restore ability to ambulate with normal pain free gait  Walking for ADL and work will be restored without increased pain  Restore ability to stand for ADL and work without increased pain  Restore ability to sit for ADL and work without increased pain  Restore ability to participate in an exercise program for Wellness ;;  Restore ability to perform ADL's and household activities independently and without increased pain  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        Selina Dempsey, PT

## 2023-06-21 ENCOUNTER — TELEPHONE (OUTPATIENT)
Dept: PAIN MEDICINE | Facility: CLINIC | Age: 28
End: 2023-06-21
Payer: COMMERCIAL

## 2023-06-21 ENCOUNTER — CLINICAL SUPPORT (OUTPATIENT)
Dept: REHABILITATION | Facility: HOSPITAL | Age: 28
End: 2023-06-21
Attending: FAMILY MEDICINE
Payer: OTHER MISCELLANEOUS

## 2023-06-21 DIAGNOSIS — G89.29 CHRONIC BILATERAL LOW BACK PAIN WITH RIGHT-SIDED SCIATICA: Primary | ICD-10-CM

## 2023-06-21 DIAGNOSIS — M54.41 CHRONIC BILATERAL LOW BACK PAIN WITH RIGHT-SIDED SCIATICA: Primary | ICD-10-CM

## 2023-06-21 DIAGNOSIS — M62.81 MUSCLE WEAKNESS: ICD-10-CM

## 2023-06-21 PROCEDURE — 97110 THERAPEUTIC EXERCISES: CPT | Mod: PN | Performed by: PHYSICAL THERAPIST

## 2023-06-21 PROCEDURE — 97530 THERAPEUTIC ACTIVITIES: CPT | Mod: PN | Performed by: PHYSICAL THERAPIST

## 2023-06-21 NOTE — TELEPHONE ENCOUNTER
----- Message from Shaina Simmons sent at 6/20/2023 12:02 PM CDT -----  Type: Needs Medical Advice  Who Called:  pt  Best Call Back Number: [phone]  Additional Information:  pt is calling the office returning a call from Ms. Veronica. Please call back to advise. Thanks!

## 2023-06-23 ENCOUNTER — CLINICAL SUPPORT (OUTPATIENT)
Dept: REHABILITATION | Facility: HOSPITAL | Age: 28
End: 2023-06-23
Payer: OTHER MISCELLANEOUS

## 2023-06-23 DIAGNOSIS — M62.81 MUSCLE WEAKNESS: ICD-10-CM

## 2023-06-23 DIAGNOSIS — M54.41 CHRONIC BILATERAL LOW BACK PAIN WITH RIGHT-SIDED SCIATICA: Primary | ICD-10-CM

## 2023-06-23 DIAGNOSIS — G89.29 CHRONIC BILATERAL LOW BACK PAIN WITH RIGHT-SIDED SCIATICA: Primary | ICD-10-CM

## 2023-06-23 PROCEDURE — 97530 THERAPEUTIC ACTIVITIES: CPT | Mod: PN | Performed by: PHYSICAL THERAPIST

## 2023-06-23 PROCEDURE — 97110 THERAPEUTIC EXERCISES: CPT | Mod: PN | Performed by: PHYSICAL THERAPIST

## 2023-06-23 NOTE — PROGRESS NOTES
[facility]   Physical Therapy Treatment Note     Name: Babar Casper  Clinic Number: [id number]    Therapy Diagnosis:   Encounter Diagnoses   Name Primary?    Chronic bilateral low back pain with right-sided sciatica Yes    Muscle weakness      Physician: Raj Radford MD    Visit Date: 6/23/2023    Evaluation Date: 5/19/2023  Authorization Period Expiration 6-15-24  Plan of Care Expiration: 7-14-23  Progress Note Due: 7-12-23   Visit # / Visits authorized: 11/18  FOTO: Issued Visit #: 1     MD Follow up appointment: 7-12-23     Precautions: Standard       PTA Visit #: 0/5     Time In: 9:17  Time Out: 10:05  Total Appointment Time (timed & untimed codes): 43 minutes     SUBJECTIVE     Pt reports: overall being doing well.  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  Pt states he did not try to work on work simulation at home on off days.      Pt was compliant with home exercise program. No issues with modification to strengthening   Response to previous treatment: felt better  Functional change walking or lying down during day has cushion for car and is improved able to lie on side with more ease   Pt has been able to squat with ADL  and able to lift cat's water bowl from floor which is 5-6# with water     Pain: 1/10 after push/pull and press up 0.5/10 and after ex 0/10  after squat in dysfunction and had to do push/pull and press up   Initial evalCurrent 4/10, worst 6/10, best 0/10    Location: right LB    OBJECTIVE     Level pelvis at IE AR R pelvis,       Min tightness paraspinals     Treatment     Babar received the treatments listed below:      therapeutic exercises to develop strength, endurance, ROM, posture, and core stabilization for 18 minutes including:    Push/pull x 3  Press up x 10    arm and leg lift to separate exercises and will monitor for aggravation of symptoms   Arm lift prone x 10  Leg lift prone x 10  Arm and leg lift  "prone x 10    Push/pull x 3  Press up x 10      Pulldown with retraction with YTB x 10  Retraction with YTB x 10  Much cueing to decrease upper trap recruitment  ER with YTB x 20 focus on proper posture    Bicep curls 4 # B 2/10 standing   Triceps with YTB sports cord x 2/10 standing  No pain at end today    (NP)Manual therapy techniques were performed to improve joint and soft tissue mobility, decrease muscle tightness and pain to lumbar and thoracic paraspinals and lumbar spine for 0 minutes.    STM to lumbar and thoracic paraspinals, R QL and P/A mob lumbar spine       Therapeutic activities were performed to restore function for ADL and return to work  for 25 min      10 reps of lifting 3# dumbbell up and placing on weight stack with 2 foam pads to simulate height pt feels high cage is and no dysfunction.  10 reps lifting 4# dumbbell as above   Pt also has low cages and simulated squat with no weight to get to low cage and went in dysfunction.    Pushed computer cart which pt felt simulated cart in clinic at 32" with 10# on cart for 1 min  x 2 in clinic and instructed in maintaining erect posture rather than leaning forward bending at trunk as pt stated he performed      Hot pack wrapped in towel to simulate lifting and carrying monkey. Lifted from surgical table height which is 8" step on plinth and lifted and placed in carrying position x 10     Pt has 5# dumbbell at home and instructed pt to perform these work sim activities daily when not coming to PT if + response.  Instructed pt in increased awareness of symptoms.  Instructed pt in push/pull and press up at onset of increased symptoms and to stop if any symptoms occur.        (NP)Chon lumbar taping was performed to the lumbar paraspinals to maintain extension and provide biofeedback to avoid flexion activities and maintain extension principles.  Instructed pt in use, care and precautions with tape.          Patient Education and Home Exercises "     Home Exercises Provided and Patient Education Provided     Education provided:   - Instructed pt in increased awareness of symptoms.  Instructed pt in push/pull at onset of increased symptoms.    - consideration lumbar brace for support  - HEP   - exercise in pain free zone       Written Home Exercises Provided:continue prior HEP with instructions as above Exercises were reviewed and Babar was able to demonstrate them prior to the end of the session.  Babar demonstrated good  understanding of the education provided. 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  Overall improving with increased tolerance to cleaning kitchen and able to further progress with work simulation though slight dysfunction and pain during treatment which was relieved with push/pull and press up.       Babar Is progressing well towards his goals.   Pt prognosis is Good.     Pt will continue to benefit from skilled outpatient physical therapy to address the goals listed in the initial evaluation, provide pt/family education and to maximize pt's level of independence in the home and community environment.     Pt's spiritual, cultural and educational needs considered and pt agreeable to plan of care and goals.     Anticipated barriers to physical therapy: none    GOALS:   Short Term Goals:  4 weeks (Progressing, not met)  Increase strength 1/2 muscle grade  Improve postural awareness of pelvis to independently identify dysfunction with min assist from PT  Be able to perform HEP with minimal cueing required     Long Term Goals: 8 weeks (Progressing, not met)  Improve muscle strength 1 muscle grade  Improve muscle strength with MMT to 4+/5 to 5/5  Improve and stabilize proper pelvic positioning  Restore ability to ambulate with normal pain free  gait  Walking for ADL and work will be restored without increased pain  Restore ability to stand for ADL and work without increased pain  Restore ability to sit for ADL and work without increased pain  Restore ability to participate in an exercise program for Wellness ;;  Restore ability to perform ADL's and household activities independently and without increased pain  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  Progress with strengthening and work simulation as tolerated.  Maintain extension principles      Selina Dempsey, PT

## 2023-06-26 NOTE — PROGRESS NOTES
[facility]   Physical Therapy Treatment Note     Name: Babar Casper  Clinic Number: [id number]    Therapy Diagnosis:   Encounter Diagnoses   Name Primary?    Chronic bilateral low back pain with right-sided sciatica Yes    Muscle weakness      Physician: Raj Radford MD    Visit Date: 6/27/2023    Evaluation Date: 5/19/2023  Authorization Period Expiration 6-15-24  Plan of Care Expiration: 7-14-23  Progress Note Due: 7-12-23   Visit # / Visits authorized: 12/18  FOTO: Issued Visit #: 1     MD Follow up appointment: 7-12-23     Precautions: Standard       PTA Visit #: 0/5     Time In: 7:50  Time Out: 8:38  Total Appointment Time (timed & untimed codes): 38 direct minutes     SUBJECTIVE     Pt reports: feeling better able to do light regular work out maintaining lumbar lordosis doing 20# and feeling muscle soreness not pain  able to transfer from car with no increase in pain  Pt has been pain free since last session     Pt was compliant with home exercise program. No issues with modification to strengthening   Response to previous treatment: felt better  Functional change walking or lying down during day has cushion for car and is improved able to lie on side with more ease   Pt has been able to squat with ADL  and able to lift cat's water bowl from floor which is 5-6# with water     Pain: 1/10 after push/pull and press up 1/10 after knee to chest and push/pull and press up  0.5/10      Location: right LB    OBJECTIVE     Level pelvis at IE AR R pelvis,       Performed flexion test supine and no increase in pain     Treatment     Babar received the treatments listed below:      therapeutic exercises to develop strength, endurance, ROM, posture, and core stabilization for 13 minutes including:    Push/pull x 3  Press up x 10   Arm and leg lift prone x 15    Push/pull x 3  Press up x 10      Pulldown with retraction with YTB x 15  Retraction with YTB x 10, cueing to make sure proper  "recruitment fatigue at 10 consider horizontal abd prone   Much cueing to decrease upper trap recruitment  ER with YTB x 20 focus on proper posture    (NP) pt performing at home with 20# and no increase dpain Bicep curls 4 # B 2/10 standing   Triceps with YTB sports cord x 2/10 standing  No pain at end today        Therapeutic activities were performed to restore function for ADL and return to work  for 25 min    Instructed pt in start of flexion principles and will do knee to chest x 10 between push/pull and press up x 10 BID  Instructed pt in need to maintain all other ex and activities with no new additions to monitor result of starting to restore flexion     10 reps of lifting 5# dumbbell up and placing on weight stack with 2 foam pads to simulate height pt feels high cage is and no dysfunction. Pt states monkey can be up to 40#   10 reps lifting 6# dumbbell as above   Pt also has low cages and simulated squat with no weight to get to low cage and went in dysfunction.    Pushed computer cart which pt felt simulated cart in clinic at 32" with 15# on cart for 1 min  x 2 in clinic and instructed in maintaining erect posture rather than leaning forward bending at trunk as pt stated he performed      Hot pack wrapped in towel to simulate lifting and carrying monkey. Lifted from surgical table height which is 8" step on plinth and lifted and placed in carrying position x 10     Pt has 10# dumbbell at home and so holding work sim at home   (NP)Chon lumbar taping was performed to the lumbar paraspinals to maintain extension and provide biofeedback to avoid flexion activities and maintain extension principles.  Instructed pt in use, care and precautions with tape.          Patient Education and Home Exercises     Home Exercises Provided and Patient Education Provided     Education provided:   - Instructed pt in increased awareness of symptoms.  Instructed pt in push/pull at onset of increased symptoms.    - " consideration lumbar brace for support  - HEP   - exercise in pain free zone       Written Home Exercises Provided:continue prior HEP with instructions as above Exercises were reviewed and Babar was able to demonstrate them prior to the end of the session.  Babar demonstrated good  understanding of the education provided. See EMR under Patient Instructions for exercises provided during therapy sessions    ASSESSMENT   Pt with improved symptoms overall, able to progress with working out, maintaining extension and appears to be able to start with flexion principles.  Pt appears to understand precautions with starting flexion principles/ Tanja treatment well and able to progress slightly with work sim.  Pt understands to keep symptoms under control and monitor response to flexion    Babar Is progressing well towards his goals.   Pt prognosis is Good.     Pt will continue to benefit from skilled outpatient physical therapy to address the goals listed in the initial evaluation, provide pt/family education and to maximize pt's level of independence in the home and community environment.     Pt's spiritual, cultural and educational needs considered and pt agreeable to plan of care and goals.     Anticipated barriers to physical therapy: none    GOALS:   Short Term Goals:  4 weeks (Progressing, not met)  Increase strength 1/2 muscle grade  Improve postural awareness of pelvis to independently identify dysfunction with min assist from PT  Be able to perform HEP with minimal cueing required     Long Term Goals: 8 weeks (Progressing, not met)  Improve muscle strength 1 muscle grade  Improve muscle strength with MMT to 4+/5 to 5/5  Improve and stabilize proper pelvic positioning  Restore ability to ambulate with normal pain free gait  Walking for ADL and work will be restored without increased pain  Restore ability to stand for ADL and work without increased pain  Restore ability to sit for ADL and work without increased  pain  Restore ability to participate in an exercise program for Wellness ;;  Restore ability to perform ADL's and household activities independently and without increased pain  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  Progress with strengthening and work simulation as tolerated.  Assess response to flexion      Selina Dempsey, PT

## 2023-06-27 ENCOUNTER — CLINICAL SUPPORT (OUTPATIENT)
Dept: REHABILITATION | Facility: HOSPITAL | Age: 28
End: 2023-06-27
Payer: OTHER MISCELLANEOUS

## 2023-06-27 DIAGNOSIS — G89.29 CHRONIC BILATERAL LOW BACK PAIN WITH RIGHT-SIDED SCIATICA: Primary | ICD-10-CM

## 2023-06-27 DIAGNOSIS — M54.41 CHRONIC BILATERAL LOW BACK PAIN WITH RIGHT-SIDED SCIATICA: Primary | ICD-10-CM

## 2023-06-27 DIAGNOSIS — M62.81 MUSCLE WEAKNESS: ICD-10-CM

## 2023-06-27 PROCEDURE — 97530 THERAPEUTIC ACTIVITIES: CPT | Mod: PN | Performed by: PHYSICAL THERAPIST

## 2023-06-27 PROCEDURE — 97110 THERAPEUTIC EXERCISES: CPT | Mod: PN | Performed by: PHYSICAL THERAPIST

## 2023-06-30 ENCOUNTER — CLINICAL SUPPORT (OUTPATIENT)
Dept: REHABILITATION | Facility: HOSPITAL | Age: 28
End: 2023-06-30
Payer: OTHER MISCELLANEOUS

## 2023-06-30 DIAGNOSIS — M54.41 CHRONIC BILATERAL LOW BACK PAIN WITH RIGHT-SIDED SCIATICA: Primary | ICD-10-CM

## 2023-06-30 DIAGNOSIS — G89.29 CHRONIC BILATERAL LOW BACK PAIN WITH RIGHT-SIDED SCIATICA: Primary | ICD-10-CM

## 2023-06-30 DIAGNOSIS — M62.81 MUSCLE WEAKNESS: ICD-10-CM

## 2023-06-30 PROCEDURE — 97110 THERAPEUTIC EXERCISES: CPT | Mod: PN | Performed by: PHYSICAL THERAPIST

## 2023-06-30 PROCEDURE — 97530 THERAPEUTIC ACTIVITIES: CPT | Mod: PN | Performed by: PHYSICAL THERAPIST

## 2023-06-30 NOTE — PROGRESS NOTES
[facility]   Physical Therapy Treatment Note     Name: Babar Caspre  Clinic Number: [id number]    Therapy Diagnosis:   Encounter Diagnoses   Name Primary?    Chronic bilateral low back pain with right-sided sciatica Yes    Muscle weakness      Physician: Raj Radford MD    Visit Date: 6/30/2023    Evaluation Date: 5/19/2023  Authorization Period Expiration 6-15-24  Plan of Care Expiration: 7-14-23  Progress Note Due: 7-12-23   Visit # / Visits authorized: 13/18  FOTO: Issued Visit #: 1     MD Follow up appointment: 7-12-23     Precautions: Standard       PTA Visit #: 0/5       Time In: 11:35  Time Out: 12:20  Total Appointment Time (timed & untimed codes): 45 minutes     SUBJECTIVE     Pt reports: had been doing well with knees to chest and did FB sitting and FB standing and did ok.  Pt states he did not do it well and did those activities yesterday around 5 PM.  Pt states he went to bed with no pain.  Pt during night was ok.  Upon awakening had sharp pain in low back B Paraspinals mostly L but feel on R also.  Pt had to bring dad somewhere and when he came back he was tired so went back to bed with heating pad and did not do exercises for he thought he just needed more sleep.  Pain continues.   Pt states he has not tried exercises yet .  Pt states until today pain will get to 1-2/10 and will stop and address symptoms and will get relief.      Pt was compliant with home exercise program. No issues with modification to strengthening   Response to previous treatment: felt better  Functional change walking or lying down during day has cushion for car and is improved able to lie on side with more ease   Pt has been able to squat with ADL  and able to lift cat's water bowl from floor which is 5-6# with water     Pain: 3/10 after MET piriformis and push/pull and press up 2/10 after knee to chest and push/pull and press up  0.5/10      Location: right LB    OBJECTIVE     AR R at IE AR  R pelvis,       Treatment     Babar received the treatments listed below:      therapeutic exercises to develop strength, endurance, ROM, posture, and core stabilization for 35 minutes including:  Pt with pelvic dysfunction and did not correct with push/pull.  Performed contract-relax to piriformis followed by push/pull which corrected dysfunction.  Pt was able to note a positive change in symptoms.     Discussed reasons for not performing ex and pt's perfect exercise surface is outside and was wet from rain.  Discussed problem and will get pt to bring cushion in and out of house to avoid that problem and use cushion in house if raining.      Pt continues with his work out routine daily    Push/pull x 3  Press up x 10   Arm and leg lift prone x 20    Push/pull x 3  Press up x 10      Pulldown with retraction with YTB x 15  Retraction with YTB x 10, cueing to make sure proper recruitment fatigue at 10 consider horizontal abd prone   Much cueing to decrease upper trap recruitment  ER with YTB x 20 focus on proper posture    (NP) pt performing at home with 20# and no increase pain Bicep curls 4 # B 2/10 standing   Triceps with YTB sports cord x 2/10 standing  No pain at end today        Therapeutic activities were performed to restore function for ADL and return to work  for 10 min    \  Instructed pt further in progression of flexion reinforcing that he was not to try any additional activities to make sure knee to chest did not provide any additional complications. Instructed pt that next week would attempt FB in sitting and since pt felt pain in sitting should not have tried flexion in standing.        HELD BELOW TODAY  10 reps of lifting 5# dumbbell up and placing on weight stack with 2 foam pads to simulate height pt feels high cage is and no dysfunction. Pt states monkey can be up to 40#   10 reps lifting 6# dumbbell as above   Pt also has low cages and simulated squat with no weight to get to low cage and went in  "dysfunction.    Pushed computer cart which pt felt simulated cart in clinic at 32" with 15# on cart for 1 min  x 2 in clinic and instructed in maintaining erect posture rather than leaning forward bending at trunk as pt stated he performed      Hot pack wrapped in towel to simulate lifting and carrying monkey. Lifted from surgical table height which is 8" step on plinth and lifted and placed in carrying position x 10     Pt has 10# dumbbell at home and so holding work sim at home   (NP)Chon lumbar taping was performed to the lumbar paraspinals to maintain extension and provide biofeedback to avoid flexion activities and maintain extension principles.  Instructed pt in use, care and precautions with tape.          Patient Education and Home Exercises     Home Exercises Provided and Patient Education Provided     Education provided:   - Instructed pt in increased awareness of symptoms.  Instructed pt in push/pull at onset of increased symptoms.    - consideration lumbar brace for support  - HEP   - exercise in pain free zone       Written Home Exercises Provided:continue prior HEP with instructions as above Exercises were reviewed and Babar was able to demonstrate them prior to the end of the session.  Babar demonstrated good  understanding of the education provided. See EMR under Patient Instructions for exercises provided during therapy sessions    ASSESSMENT   Pt developed pelvic dysfunction possibly from FB prior to when he should have started.  Pt appears to have better understanding of progression with flexion principles and will maintain only knee to chest.  No dysfunction after knee to chest indicating good pelvic stabilization despite earlier dysfunction and will monitor.  Patient appears to understand increased awareness of symptoms and to perform push/pull and press up at onset of increased symptoms.      Babar Is progressing well towards his goals.   Pt prognosis is Good.     Pt will continue to benefit " from skilled outpatient physical therapy to address the goals listed in the initial evaluation, provide pt/family education and to maximize pt's level of independence in the home and community environment.     Pt's spiritual, cultural and educational needs considered and pt agreeable to plan of care and goals.     Anticipated barriers to physical therapy: none    GOALS:   Short Term Goals:  4 weeks (Progressing, not met)  Increase strength 1/2 muscle grade  Improve postural awareness of pelvis to independently identify dysfunction with min assist from PT  Be able to perform HEP with minimal cueing required     Long Term Goals: 8 weeks (Progressing, not met)  Improve muscle strength 1 muscle grade  Improve muscle strength with MMT to 4+/5 to 5/5  Improve and stabilize proper pelvic positioning  Restore ability to ambulate with normal pain free gait  Walking for ADL and work will be restored without increased pain  Restore ability to stand for ADL and work without increased pain  Restore ability to sit for ADL and work without increased pain  Restore ability to participate in an exercise program for Wellness ;;  Restore ability to perform ADL's and household activities independently and without increased pain  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  Progress with strengthening and work simulation as tolerated.  Assess response to flexion      Selina Dempsey, PT

## 2023-07-05 NOTE — PROGRESS NOTES
[facility]   Physical Therapy Treatment Note     Name: Babar Casper  Clinic Number: [id number]    Therapy Diagnosis:   Encounter Diagnoses   Name Primary?    Chronic bilateral low back pain with right-sided sciatica Yes    Muscle weakness      Physician: Raj Radford MD    Visit Date: 7/6/2023    Evaluation Date: 5/19/2023  Authorization Period Expiration 6-15-24  Plan of Care Expiration: 7-14-23  Progress Note Due: 7-12-23   Visit # / Visits authorized: 14/18  FOTO: Issued Visit #: 1     MD Follow up appointment: 7-12-23     Precautions: Standard       PTA Visit #: 0/5       Time In: 7:51 pt late  Time Out: 9:05    Total Billing Time (timed & untimed codes): 74 minutes     SUBJECTIVE     Pt reports: had a couple of days of R sided back pain with pain level of strong 2/10 and would be short lived, but would return at times.   Pt had good day yesterday Pt just recently moved quickly getting into his mother's car and noted slight symptoms and feels he went into dysfunction but has had some 0/10 times     Pt was compliant with home exercise program.  Response to previous treatment: felt better  Functional change walking or lying down during day has cushion for car and is improved able to lie on side with more ease   Pt has been able to squat with ADL  and able to lift cat's water bowl from floor which is 5-6# with water     Pain: 1/10 after knee to chest and push/pull and press up  0.5/10 after exercise 0/10     Location: right LB    OBJECTIVE     AR R at IE AR R pelvis,       Treatment     Babar received the treatments listed below:      therapeutic exercises to develop strength, endurance, ROM, posture, and core stabilization for 30 minutes including:    Pt has held his work out routine daily    Push/pull x 3  Press up x 10   Knee to chest x 10  Push/pull x 3  Press up x 10    Arm and leg lift prone x 20   Horizontal abd prone neutral x 10    Pulldown with retraction with YTB x  "20  Retraction with YTB x 20, cueing to make sure proper recruitment fatigue at 10 consider horizontal abd prone   Much cueing to decrease upper trap recruitment  ER with YTB x 20 focus on proper posture     Bicep curls 5 # B 2/10 standing   Triceps with YTB sports cord x 2/10 standing    Push/pull x 3  Press up x 10    No pain at end today        Therapeutic activities were performed to restore function for ADL and return to work  for 44 min      Instructed pt further in progression of flexion principles and if continue with symptoms may need to hold knee to chest to allow further healing to occur.      Provided encouragement to restore symptom free and allow healing to occur to restore previous level of function and mobility    10 reps of lifting 5# dumbbell up and placing on weight stack with 2 foam pads to simulate height pt feels high cage is and no dysfunction. Pt states monkey can be up to 40#   10 reps lifting 6# dumbbell as above   Pt also has low cages and simulated squat with no weight to get to low cage and went in dysfunction.    Pushed computer cart which pt felt simulated cart in clinic at 32" with 15# on cart for 1 min  x 2 in clinic and corrected position to maintain erect posture rather than leaning forward bending at trunk as pt stated he performed      Hot pack wrapped in towel(12.4#)  to simulate lifting and carrying monkey. Lifted from surgical table height which is 8" step on plinth and lifted and placed in carrying position, move monkey from one end of table to another and walk around carrying hot pack x 1 min Slight tightness at end noted pelvic dysfunction.  Performed push/pull press up and pain down to 0 Instructed pt further in proper lifting techniques   Repeated activity after providing further cueing for proper lifting and carrying and no dysfunction at end.       (NP)Chon lumbar taping was performed to the lumbar paraspinals to maintain extension and provide biofeedback to avoid " flexion activities and maintain extension principles.  Instructed pt in use, care and precautions with tape.          Patient Education and Home Exercises     Home Exercises Provided and Patient Education Provided     Education provided:   - Instructed pt in increased awareness of symptoms.  Instructed pt in push/pull at onset of increased symptoms.    - consideration lumbar brace for support  - HEP   - exercise in pain free zone       Written Home Exercises Provided:yes  Exercises were reviewed and Babar was able to demonstrate them prior to the end of the session.  Babar demonstrated good  understanding of the education provided. See EMR under Patient Instructions for exercises provided during therapy sessions    ASSESSMENT   Pt continues to recover from FB in standing activity, but overall improving and able to resume and progress work simulation activities.  Pt did develop dysfunction during lifting activities and was able to perform therex led to relief and able to resume.  With slight modification and correction of form able to lift without dysfunction or pain    Babar Is progressing well towards his goals.   Pt prognosis is Good.     Pt will continue to benefit from skilled outpatient physical therapy to address the goals listed in the initial evaluation, provide pt/family education and to maximize pt's level of independence in the home and community environment.     Pt's spiritual, cultural and educational needs considered and pt agreeable to plan of care and goals.     Anticipated barriers to physical therapy: none    GOALS:   Short Term Goals:  4 weeks (Progressing, not met)  Increase strength 1/2 muscle grade  Improve postural awareness of pelvis to independently identify dysfunction with min assist from PT  Be able to perform HEP with minimal cueing required     Long Term Goals: 8 weeks (Progressing, not met)  Improve muscle strength 1 muscle grade  Improve muscle strength with MMT to 4+/5 to 5/5  Improve  and stabilize proper pelvic positioning  Restore ability to ambulate with normal pain free gait  Walking for ADL and work will be restored without increased pain  Restore ability to stand for ADL and work without increased pain  Restore ability to sit for ADL and work without increased pain  Restore ability to participate in an exercise program for Wellness ;;  Restore ability to perform ADL's and household activities independently and without increased pain  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  Progress with strengthening and work simulation as tolerated.  Assess response to flexion      Selina Dempsey, PT

## 2023-07-06 ENCOUNTER — CLINICAL SUPPORT (OUTPATIENT)
Dept: REHABILITATION | Facility: HOSPITAL | Age: 28
End: 2023-07-06
Payer: OTHER MISCELLANEOUS

## 2023-07-06 DIAGNOSIS — M62.81 MUSCLE WEAKNESS: ICD-10-CM

## 2023-07-06 DIAGNOSIS — M54.41 CHRONIC BILATERAL LOW BACK PAIN WITH RIGHT-SIDED SCIATICA: Primary | ICD-10-CM

## 2023-07-06 DIAGNOSIS — G89.29 CHRONIC BILATERAL LOW BACK PAIN WITH RIGHT-SIDED SCIATICA: Primary | ICD-10-CM

## 2023-07-06 PROCEDURE — 97530 THERAPEUTIC ACTIVITIES: CPT | Mod: PN | Performed by: PHYSICAL THERAPIST

## 2023-07-06 PROCEDURE — 97110 THERAPEUTIC EXERCISES: CPT | Mod: PN | Performed by: PHYSICAL THERAPIST

## 2023-07-06 NOTE — PROGRESS NOTES
[facility]   Physical Therapy Progress and Treatment Note     Name: Babar Casper  Clinic Number: [id number]    Therapy Diagnosis:   Encounter Diagnoses   Name Primary?    Chronic bilateral low back pain with right-sided sciatica Yes    Muscle weakness      Physician: Raj Radford MD    Visit Date: 7/7/2023    Evaluation Date: 5/19/2023  Authorization Period Expiration 6-15-24  Plan of Care Expiration: 8-18-23  Progress Note Due: 8-7-23   Visit # / Visits authorized: 15/18  FOTO: Issued Visit #: 1     MD Follow up appointment: 7-12-23     Precautions: Standard       PTA Visit #: 0/5     Time In: 9:25  Time Out: 10:09  Total Billing Time (timed & untimed codes):44 minutes     SUBJECTIVE     Pt reports: did well yesterday This AM got up quickly had slight pain no change with push/pull and press up this AM  Pt was compliant with home exercise program.  Response to previous treatment: felt good with no pain   Functional change walking or lying down during day has cushion for car and is improved able to lie on side with more ease   Pt has been able to squat with ADL  and able to lift cat's water bowl from floor which is 5-6# with water     Pain: 0.5/10 after knee to chest and push/pull and press up  0.25/10 after exercise 0/10     Location: right LB    OBJECTIVE          Functional assessment:   - walking: normal gait  at IE decreased trunk mobility noted with gait   - sit to stand: no difficulty at IE mod difficulty  - sit to supine: no difficulty at IE min difficulty       - supine to sit: no difficulty at IE min difficulty  - supine to prone: no difficulty at IE min difficulty     Lumbar active range of motion in standing is: 7-7-23  - flexion - not tested due to maintaining ext principles                     - extension -  100%                         - left side bending -  to knee         - right side bending -  to knee            Lumbar active range of motion in standing is:  initial eval  - flexion - toes                     - extension -  75%                         - left side bending -  to knee         - right side bending -  to knee           Pelvic positioning: pt able to self mobilize at IE AR R         Muscle Strength 7-7-23  MMT R L   Hip flexion 5-/5 5-/5   Hip abduction 5/5 5/5   Hip extension 5/5 5/5   Ankle dorsiflexion 5/5 5/5   EHL 5/5 5/5   Ankle eversion 5/5 5/5            Muscle Strength initial eval  MMT R L   Hip flexion 3+/5 4/5   Hip abduction 4-/5 4/5   Hip extension 3+/5 4/5   Knee extension 5/5 5/5   Knee flexion 5/5 5/5   Ankle dorsiflexion 5/5 5/5   EHL 5/5 5/5   Ankle eversion 5/5 5/5         Endurance is fair, but improving at IE not tested   \     Palpation:  min TTP lumbar paraspinals but noted specific area of mod-severe TTP R QL at insertion on ribs, no significant in gluteals/piriformis mod-severe rhomboids/middle trap mod tightness and TTP lumbar paraspinals and R gluteals/piriformis     Joint mobility: min decreased spring at IE mod decreased mobility  No lumbar dysfunction noted at IE FRS L L 3       PT reviewed FOTO scores for Babar Casper   FOTO intake score: 67 at IE 62   FOTO scores were entered into EPIC - see media section.       Treatment     Babar received the treatments listed below:      therapeutic exercises to develop strength, endurance, ROM, posture, and core stabilization for 14 minutes including:    Instructed pt to return to his work out at light weight as tolerated    Push/pull x 3  Press up x 10   Knee to chest x 10  Push/pull x 3  Press up x 10    HELD below in clinic due to time constraints  Arm and leg lift prone x 20   Horizontal abd prone neutral x 10    Pulldown with retraction with YTB x 20  Retraction with YTB x 20, cueing to make sure proper recruitment fatigue at 10 consider horizontal abd prone   Much cueing to decrease upper trap recruitment  ER with YTB x 20 focus on proper posture     Bicep curls 5 # B 2/10 standing  "  Triceps with YTB sports cord x 2/10 standing    Push/pull x 3  Press up x 10    No pain at end today        Therapeutic activities were performed to restore function for ADL and return to work  for 30 min      Instructed pt further in progression of flexion principles and if continue with symptoms may need to hold knee to chest to allow further healing to occur.      Provided encouragement to restore symptom free and allow healing to occur to restore previous level of function and mobility    Pt has rubber boots for work and instructed     10 reps of lifting 6# dumbbell up and placing on weight stack with 2 foam pads to simulate height pt feels high cage is and no dysfunction. Pt states monkey can be up to 40#   10 reps lifting 7# dumbbell as above   (NP)Pt also has low cages and simulated squat with no weight to get to low cage and went in dysfunction.    Pushed computer cart which pt felt simulated cart in clinic at 32" with 15# on cart for 2 min in clinic and reiterated position to maintain erect posture rather than leaning forward bending at trunk as pt stated he performed      Hot pack wrapped in towel(12.4#)  to simulate lifting and carrying monkey. Lifted from surgical table height which is 8" step on plinth and lifted and placed in carrying position, move monkey from one end of table to another and walk around carrying hot pack x 1 min Slight tightness at end noted pelvic dysfunction.  Performed push/pull press up and pain down to 0   Repeated activity after providing further cueing for proper lifting and carrying and no dysfunction at end.       (NP)Chon lumbar taping was performed to the lumbar paraspinals to maintain extension and provide biofeedback to avoid flexion activities and maintain extension principles.  Instructed pt in use, care and precautions with tape.          Patient Education and Home Exercises     Home Exercises Provided and Patient Education Provided     Education provided:   - " Instructed pt in increased awareness of symptoms.  Instructed pt in push/pull at onset of increased symptoms.    - consideration lumbar brace for support  - HEP   - exercise in pain free zone       Written Home Exercises Provided:continue prior HEP  Exercises were reviewed and Babar was able to demonstrate them prior to the end of the session.  Babar demonstrated good  understanding of the education provided. See EMR under Patient Instructions for exercises provided during therapy sessions    ASSESSMENT   Patient demonstrates improvement in range of motion, strength, stabilization and function.    Pt had a flare up when he attempted to perform FB in standing.  Pt continues to recover from FB in standing activity, and overall improving and able to resume and progress work simulation activities.  Pt did develop dysfunction during lifting activities and was able to perform therex led to relief and able to resume.     Babar Is progressing well towards his goals.   Pt prognosis is Good.     Pt will continue to benefit from skilled outpatient physical therapy to address the goals listed in the initial evaluation, provide pt/family education and to maximize pt's level of independence in the home and community environment.     Pt's spiritual, cultural and educational needs considered and pt agreeable to plan of care and goals.     Anticipated barriers to physical therapy: none    GOALS:   Short Term Goals:  4 weeks MET STG's  Increase strength 1/2 muscle grade  Improve postural awareness of pelvis to independently identify dysfunction with min assist from PT  Be able to perform HEP with minimal cueing required     Long Term Goals: 8 weeks (Progressing, not met)  Improve muscle strength 1 muscle grade  Improve muscle strength with MMT to 4+/5 to 5/5  Improve and stabilize proper pelvic positioning  Restore ability to ambulate with normal pain free gait  Walking for ADL and work will be restored without increased pain  Restore  ability to stand for ADL and work without increased pain  Restore ability to sit for ADL and work without increased pain  Restore ability to participate in an exercise program for Wellness ;;  Restore ability to perform ADL's and household activities independently and without increased pain  Restore ability to carry monkeys and cages as needed for work to perform full job duties as  #2       PLAN   If you concur, I recommend patient continue with physical therapy 2 times a week  for 6 weeks.  Please advise us of your  recommendations. Thank you for allowing us to assist in the care of your patient.      Selina Dempsey, PT, MSPT

## 2023-07-07 ENCOUNTER — CLINICAL SUPPORT (OUTPATIENT)
Dept: REHABILITATION | Facility: HOSPITAL | Age: 28
End: 2023-07-07
Payer: OTHER MISCELLANEOUS

## 2023-07-07 DIAGNOSIS — G89.29 CHRONIC BILATERAL LOW BACK PAIN WITH RIGHT-SIDED SCIATICA: Primary | ICD-10-CM

## 2023-07-07 DIAGNOSIS — M54.41 CHRONIC BILATERAL LOW BACK PAIN WITH RIGHT-SIDED SCIATICA: Primary | ICD-10-CM

## 2023-07-07 DIAGNOSIS — M62.81 MUSCLE WEAKNESS: ICD-10-CM

## 2023-07-07 PROCEDURE — 97110 THERAPEUTIC EXERCISES: CPT | Mod: PN | Performed by: PHYSICAL THERAPIST

## 2023-07-07 PROCEDURE — 97530 THERAPEUTIC ACTIVITIES: CPT | Mod: PN | Performed by: PHYSICAL THERAPIST

## 2023-07-07 NOTE — PLAN OF CARE
[facility] OUTPATIENT THERAPY AND WELL[facility]   Physical Therapy Progress and Treatment Note     Name: Babar Casper  Clinic Number: [id number]    Therapy Diagnosis:   Encounter Diagnoses   Name Primary?    Chronic bilateral low back pain with right-sided sciatica Yes    Muscle weakness      Physician: Raj Radford MD    Visit Date: 7/7/2023    Evaluation Date: 5/19/2023  Authorization Period Expiration 6-15-24  Plan of Care Expiration: 8-18-23  Progress Note Due: 8-7-23   Visit # / Visits authorized: 15/18  FOTO: Issued Visit #: 1     MD Follow up appointment: 7-12-23     Precautions: Standard       PTA Visit #: 0/5     Time In: 9:25  Time Out: 10:09  Total Billing Time (timed & untimed codes):44 minutes     SUBJECTIVE     Pt reports: did well yesterday This AM got up quickly had slight pain no change with push/pull and press up this AM  Pt was compliant with home exercise program.  Response to previous treatment: felt good with no pain   Functional change walking or lying down during day has cushion for car and is improved able to lie on side with more ease   Pt has been able to squat with ADL  and able to lift cat's water bowl from floor which is 5-6# with water     Pain: 0.5/10 after knee to chest and push/pull and press up  0.25/10 after exercise 0/10     Location: right LB    OBJECTIVE          Functional assessment:   - walking: normal gait  at IE decreased trunk mobility noted with gait   - sit to stand: no difficulty at IE mod difficulty  - sit to supine: no difficulty at IE min difficulty       - supine to sit: no difficulty at IE min difficulty  - supine to prone: no difficulty at IE min difficulty     Lumbar active range of motion in standing is: 7-7-23  - flexion - not tested due to maintaining ext principles                     - extension -  100%                         - left side bending -  to knee         - right side bending -  to knee            Lumbar active range of motion in standing is:  initial eval  - flexion - toes                     - extension -  75%                         - left side bending -  to knee         - right side bending -  to knee           Pelvic positioning: pt able to self mobilize at IE AR R         Muscle Strength 7-7-23  MMT R L   Hip flexion 5-/5 5-/5   Hip abduction 5/5 5/5   Hip extension 5/5 5/5   Ankle dorsiflexion 5/5 5/5   EHL 5/5 5/5   Ankle eversion 5/5 5/5            Muscle Strength initial eval  MMT R L   Hip flexion 3+/5 4/5   Hip abduction 4-/5 4/5   Hip extension 3+/5 4/5   Knee extension 5/5 5/5   Knee flexion 5/5 5/5   Ankle dorsiflexion 5/5 5/5   EHL 5/5 5/5   Ankle eversion 5/5 5/5         Endurance is fair, but improving at IE not tested   \     Palpation:  min TTP lumbar paraspinals but noted specific area of mod-severe TTP R QL at insertion on ribs, no significant in gluteals/piriformis mod-severe rhomboids/middle trap mod tightness and TTP lumbar paraspinals and R gluteals/piriformis     Joint mobility: min decreased spring at IE mod decreased mobility  No lumbar dysfunction noted at IE FRS L L 3       PT reviewed FOTO scores for Babar Casper   FOTO intake score: 67 at IE 62   FOTO scores were entered into EPIC - see media section.       Treatment     Babar received the treatments listed below:      therapeutic exercises to develop strength, endurance, ROM, posture, and core stabilization for 14 minutes including:    Instructed pt to return to his work out at light weight as tolerated    Push/pull x 3  Press up x 10   Knee to chest x 10  Push/pull x 3  Press up x 10    HELD below in clinic due to time constraints  Arm and leg lift prone x 20   Horizontal abd prone neutral x 10    Pulldown with retraction with YTB x 20  Retraction with YTB x 20, cueing to make sure proper recruitment fatigue at 10 consider horizontal abd prone   Much cueing to decrease upper trap recruitment  ER with YTB x 20 focus on proper posture     Bicep curls 5 # B 2/10 standing  "  Triceps with YTB sports cord x 2/10 standing    Push/pull x 3  Press up x 10    No pain at end today        Therapeutic activities were performed to restore function for ADL and return to work  for 30 min      Instructed pt further in progression of flexion principles and if continue with symptoms may need to hold knee to chest to allow further healing to occur.      Provided encouragement to restore symptom free and allow healing to occur to restore previous level of function and mobility    Pt has rubber boots for work and instructed     10 reps of lifting 6# dumbbell up and placing on weight stack with 2 foam pads to simulate height pt feels high cage is and no dysfunction. Pt states monkey can be up to 40#   10 reps lifting 7# dumbbell as above   (NP)Pt also has low cages and simulated squat with no weight to get to low cage and went in dysfunction.    Pushed computer cart which pt felt simulated cart in clinic at 32" with 15# on cart for 2 min in clinic and reiterated position to maintain erect posture rather than leaning forward bending at trunk as pt stated he performed      Hot pack wrapped in towel(12.4#)  to simulate lifting and carrying monkey. Lifted from surgical table height which is 8" step on plinth and lifted and placed in carrying position, move monkey from one end of table to another and walk around carrying hot pack x 1 min Slight tightness at end noted pelvic dysfunction.  Performed push/pull press up and pain down to 0   Repeated activity after providing further cueing for proper lifting and carrying and no dysfunction at end.       (NP)Chon lumbar taping was performed to the lumbar paraspinals to maintain extension and provide biofeedback to avoid flexion activities and maintain extension principles.  Instructed pt in use, care and precautions with tape.          Patient Education and Home Exercises     Home Exercises Provided and Patient Education Provided     Education provided:   - " Instructed pt in increased awareness of symptoms.  Instructed pt in push/pull at onset of increased symptoms.    - consideration lumbar brace for support  - HEP   - exercise in pain free zone       Written Home Exercises Provided:continue prior HEP  Exercises were reviewed and Babar was able to demonstrate them prior to the end of the session.  Babar demonstrated good  understanding of the education provided. See EMR under Patient Instructions for exercises provided during therapy sessions    ASSESSMENT   Patient demonstrates improvement in range of motion, strength, stabilization and function.    Pt had a flare up when he attempted to perform FB in standing.  Pt continues to recover from FB in standing activity, and overall improving and able to resume and progress work simulation activities.  Pt did develop dysfunction during lifting activities and was able to perform therex led to relief and able to resume.     Babar Is progressing well towards his goals.   Pt prognosis is Good.     Pt will continue to benefit from skilled outpatient physical therapy to address the goals listed in the initial evaluation, provide pt/family education and to maximize pt's level of independence in the home and community environment.     Pt's spiritual, cultural and educational needs considered and pt agreeable to plan of care and goals.     Anticipated barriers to physical therapy: none    GOALS:   Short Term Goals:  4 weeks MET STG's  Increase strength 1/2 muscle grade  Improve postural awareness of pelvis to independently identify dysfunction with min assist from PT  Be able to perform HEP with minimal cueing required     Long Term Goals: 8 weeks (Progressing, not met)  Improve muscle strength 1 muscle grade  Improve muscle strength with MMT to 4+/5 to 5/5  Improve and stabilize proper pelvic positioning  Restore ability to ambulate with normal pain free gait  Walking for ADL and work will be restored without increased pain  Restore  ability to stand for ADL and work without increased pain  Restore ability to sit for ADL and work without increased pain  Restore ability to participate in an exercise program for Wellness ;;  Restore ability to perform ADL's and household activities independently and without increased pain  Restore ability to carry monkeys and cages as needed for work to perform full job duties as  #2       PLAN   If you concur, I recommend patient continue with physical therapy 2 times a week  for 6 weeks.  Please advise us of your  recommendations. Thank you for allowing us to assist in the care of your patient.      Selina Dempsey, PT, MSPT

## 2023-07-11 ENCOUNTER — HOSPITAL ENCOUNTER (OUTPATIENT)
Dept: RADIOLOGY | Facility: HOSPITAL | Age: 28
Discharge: HOME | End: 2023-07-11
Attending: ANESTHESIOLOGY | Admitting: ANESTHESIOLOGY
Payer: OTHER MISCELLANEOUS

## 2023-07-11 ENCOUNTER — TELEPHONE (OUTPATIENT)
Dept: PAIN MEDICINE | Facility: CLINIC | Age: 28
End: 2023-07-11

## 2023-07-11 DIAGNOSIS — M54.50 LOWER BACK PAIN: ICD-10-CM

## 2023-07-12 ENCOUNTER — OFFICE VISIT (OUTPATIENT)
Dept: URGENT CARE | Facility: CLINIC | Age: 28
End: 2023-07-12
Payer: OTHER MISCELLANEOUS

## 2023-07-12 VITALS
HEIGHT: 70 IN | RESPIRATION RATE: 16 BRPM | DIASTOLIC BLOOD PRESSURE: 87 MMHG | WEIGHT: 146 LBS | BODY MASS INDEX: 20.9 KG/M2 | TEMPERATURE: 98 F | OXYGEN SATURATION: 98 % | HEART RATE: 60 BPM | SYSTOLIC BLOOD PRESSURE: 134 MMHG

## 2023-07-12 DIAGNOSIS — M47.816 LUMBAR SPONDYLOSIS: ICD-10-CM

## 2023-07-12 DIAGNOSIS — Z02.6 ENCOUNTER RELATED TO WORKER'S COMPENSATION CLAIM: Primary | ICD-10-CM

## 2023-07-12 DIAGNOSIS — M54.9 DORSALGIA, UNSPECIFIED: ICD-10-CM

## 2023-07-12 DIAGNOSIS — M54.50 LUMBOSACRAL PAIN: ICD-10-CM

## 2023-07-12 DIAGNOSIS — M54.10 RADICULAR PAIN OF LOWER EXTREMITY: ICD-10-CM

## 2023-07-12 PROCEDURE — 99213 OFFICE O/P EST LOW 20 MIN: CPT | Mod: S$GLB,,, | Performed by: FAMILY MEDICINE

## 2023-07-12 PROCEDURE — 99213 PR OFFICE/OUTPT VISIT, EST, LEVL III, 20-29 MIN: ICD-10-PCS | Mod: S$GLB,,, | Performed by: FAMILY MEDICINE

## 2023-07-12 NOTE — PROGRESS NOTES
Subjective:      Patient ID: Babar Casper is a 27 y.o. male.    Chief Complaint: Back Pain    Pt presents to urgent care for a w/c follow up.  He works for [employer].  He states that his back pain is much better.  He has been going to physical therapy and that is helping a lot.  He states that they denied his epidural, but he is over it.  He is ready to go back to work on Monday, on light duty.  Pain scale- 1 soreness. He has one muscle relaxer left, ran out of the naproxen.  He has been taking ibuprofen as needed.  DOI- 4/27/2023.     Back Pain  This is a new problem. The current episode started more than 1 month ago. The problem occurs constantly. The problem is unchanged. The pain does not radiate. The pain is at a severity of 1/10. He has tried nothing for the symptoms. The treatment provided no relief.     Musculoskeletal:  Positive for back pain.   Objective:     Physical Exam  Musculoskeletal:         General: Tenderness present.      Lumbar back: Tenderness present.        Back:       Comments: Continued tightness with FF      Assessment:      1. Encounter related to worker's compensation claim    2. Dorsalgia, unspecified    3. Lumbosacral pain    4. Radicular pain of lower extremity    5. Acute myofascial strain of lumbar region, subsequent encounter      Plan:   Pt sx are improving though he still has episodes of spasms in lower back with certain movts.  He would like to rtw on light duty. Letter from ariela completed.        Patient Instructions: Continue Physical Therapy, Daily home exercises/warm soaks, Attention not to aggravate affected area   Restrictions: No lifting/pushing/pulling more than 25 lbs, Avoid frequent bending/lifting/twisting  No follow-ups on file.

## 2023-07-12 NOTE — LETTER
[facility] [place] [address], [address]  [place] [address]  Phone: [phone]  Fax: [phone]  [facility]: [id number]    Pt Name: Babar Casper  Injury Date: 07/02/2018   Employee ID: [id number] Date of First Treatment: 07/12/2023   Company: [employer]      Appointment Time: 07:45 AM Arrived: 800 am   Provider: Raj Radford MD Time Out:830 am     Office Treatment:   1. Encounter related to worker's compensation claim    2. Dorsalgia, unspecified    3. Lumbosacral pain    4. Radicular pain of lower extremity    5. Lumbar spondylosis            Patient Instructions: Continue Physical Therapy, Daily home exercises/warm soaks, Attention not to aggravate affected area      Restrictions: No lifting/pushing/pulling more than 25 lbs, Avoid frequent bending/lifting/twisting     Pt may return to work with restrictions on 7/17/2023    Return Appointment: 8/9/2023 or sooner if needed.

## 2023-07-12 NOTE — LETTER
[facility] [place] [address], [address]  [place] [address]  Phone: [phone]  Fax: [phone]  [facility]: [id number]    Pt Name: Babar Casper  Injury Date: 4/27/2023   Employee ID: [id number] Date of  Treatment: 07/12/2023   Company: [employer]      Appointment Time: 07:45 AM Arrived: 800   Provider: Raj Radford MD Time Out:830     Office Treatment:   1. Encounter related to worker's compensation claim    2. Dorsalgia, unspecified    3. Lumbosacral pain    4. Radicular pain of lower extremity    5. Acute myofascial strain of lumbar region, subsequent encounter          Patient Instructions: Continue Physical Therapy, Daily home exercises/warm soaks, Attention not to aggravate affected area    Restrictions: No lifting/pushing/pulling more than 25 lbs, Avoid frequent bending/lifting/twisting     Return Appointment: 8/9 or sooner if needed

## 2023-07-13 NOTE — PROGRESS NOTES
[facility] OUTPATIENT THERAPY AND WELLNESS   Physical Therapy Treatment Note     Name: Babar Casper  Clinic Number: [id number]    Therapy Diagnosis:   Encounter Diagnoses   Name Primary?    Chronic bilateral low back pain with right-sided sciatica Yes    Muscle weakness      Physician: Raj Radford MD    Visit Date: 7/14/2023    Evaluation Date: 5/19/2023  Authorization Period Expiration 6-15-24  Plan of Care Expiration: 8-18-23  Progress Note Due: 8-7-23   Visit # / Visits authorized: 15/18  FOTO: Issued Visit #: 1     MD Follow up appointment: 7-12-23     Precautions: Standard       PTA Visit #: 0/5     Time In: 7:50  Time Out: 8:35  Total Billing Time (timed & untimed codes):45 minutes     SUBJECTIVE     Pt reports: he did not get injection.  Pt states feeling better and some pain upon awakening. Pt has air mattress and may be losing balance.  Pt states going back to work Monday full time with restrictions  Pt was compliant with home exercise program.  Response to previous treatment: felt good with no pain   Functional change walking or lying down during day has cushion for car and is improved able to lie on side with more ease   Pt has been able to squat with ADL  and able to lift cat's water bowl from floor which is 5-6# with water     Pain: 1/10 after knee to chest and push/pull and press up  0.25/10 after Rx 0/10     Location: right LB    OBJECTIVE      Pelvic positioning: slight AR R at IE AR R      Treatment     Babar received the treatments listed below:      therapeutic exercises to develop strength, endurance, ROM, posture, and core stabilization for 15 minutes including:    Instructed pt to return to his work out at light weight as tolerated  Work restrictions limit 20# limit and no frequent twisting      Push/pull x 3  Press up x 10   Knee to chest x 10  Push/pull x 3  Press up x 10    Pulldown with retraction with YTB x 20  Retraction with YTB x 20, cueing to make sure proper recruitment fatigue  "at 10 consider horizontal abd prone   Much cueing to decrease upper trap recruitment  ER with YTB x 20 focus on proper posture    HELD below in clinic due to time constraints  Arm and leg lift prone x 20  Horizontal abd prone neutral x 10       Bicep curls 5 # B 2/10 standing   Triceps with YTB sports cord x 2/10 standing    Push/pull x 3  Press up x 10    No pain at end today        Therapeutic activities were performed to restore function for ADL and return to work  for 30 min    Therapeutic activities were performed to improve function and decrease pain with sleep.  Instructed pt in increased awareness of symptoms prior to going to bed and upon arising in AM and to note if pain is increased during the night.  If increased symptoms noted or loss of sleep is noted modifications to sleep surface and/or posture during sleep needs to be addressed with instruction on ways to modify sleep surface and/or position to restore ability to sleep without disturbances due to pain and to assist healing process during the night by avoiding an increase in symptoms  Sleep discussion and pt to pump up air mattress to appropriate level every day     Pt has rubber boots for work and instructed pt he may go in dysfunction easier     10 reps of lifting 8# dumbbell up and placing on weight stack with 2 foam pads to simulate height pt feels high cage is and no dysfunction. Pt states monkey can be up to 40#   10 reps lifting 10# dumbbell as above  slight dysfunction and pain performed push/pull and press up and 0/10 pain at end  (NP)Pt also has low cages and simulated squat with no weight to get to low cage and went in dysfunction.    HELD BELOW DUE TO TIME CONSTRAINTS  Pushed computer cart which pt felt simulated cart in clinic at 32" with 15# on cart for 2 min in clinic and reiterated position to maintain erect posture rather than leaning forward bending at trunk as pt stated he performed      Hot pack wrapped in towel(12.4#)  to " "simulate lifting and carrying monkey. Lifted from surgical table height which is 8" step on plinth and lifted and placed in carrying position, move monkey from one end of table to another and walk around carrying hot pack x 1 min Slight tightness at end noted pelvic dysfunction.  Performed push/pull press up and pain down to 0   Repeated activity after providing further cueing for proper lifting and carrying and no dysfunction at end.       (NP)Chon lumbar taping was performed to the lumbar paraspinals to maintain extension and provide biofeedback to avoid flexion activities and maintain extension principles.  Instructed pt in use, care and precautions with tape.          Patient Education and Home Exercises     Home Exercises Provided and Patient Education Provided     Education provided:   - Instructed pt in increased awareness of symptoms.  Instructed pt in push/pull at onset of increased symptoms.    - consideration lumbar brace for support  - HEP   - exercise in pain free zone       Written Home Exercises Provided:continue prior HEP  Exercises were reviewed and Babar was able to demonstrate them prior to the end of the session.  Babar demonstrated good  understanding of the education provided. See EMR under Patient Instructions for exercises provided during therapy sessions    ASSESSMENT   Pt appears to be managing symptoms well and appears to understand need to improve sleep surface to be avoid increased pain upon awakening.  Pt to go back to work and appears to understand precautions.  Pt tolerated treatment well and able to progress with functional lifting with min dysfunction which was corrected with push/pull and press up  Pt had talked about increasing resistance with scap stab and reiterated to pt need to increase reps at current level prior to progressing slightly with more resistance for pt continues with fatigue at end of ex at current level.  Provided pt info on obtaining therabands for home use " in future when ready for increased resistance.     Babar Is progressing well towards his goals.   Pt prognosis is Good.     Pt will continue to benefit from skilled outpatient physical therapy to address the goals listed in the initial evaluation, provide pt/family education and to maximize pt's level of independence in the home and community environment.     Pt's spiritual, cultural and educational needs considered and pt agreeable to plan of care and goals.     Anticipated barriers to physical therapy: none    GOALS:   Short Term Goals:  4 weeks MET STG's  Increase strength 1/2 muscle grade  Improve postural awareness of pelvis to independently identify dysfunction with min assist from PT  Be able to perform HEP with minimal cueing required     Long Term Goals: 8 weeks (Progressing, not met)  Improve muscle strength 1 muscle grade  Improve muscle strength with MMT to 4+/5 to 5/5  Improve and stabilize proper pelvic positioning  Restore ability to ambulate with normal pain free gait  Walking for ADL and work will be restored without increased pain  Restore ability to stand for ADL and work without increased pain  Restore ability to sit for ADL and work without increased pain  Restore ability to participate in an exercise program for Wellness ;;  Restore ability to perform ADL's and household activities independently and without increased pain  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    Assist pt in full return to work     Selina Dempsey, PT, MSPT

## 2023-07-14 ENCOUNTER — CLINICAL SUPPORT (OUTPATIENT)
Dept: REHABILITATION | Facility: HOSPITAL | Age: 28
End: 2023-07-14
Payer: OTHER MISCELLANEOUS

## 2023-07-14 DIAGNOSIS — G89.29 CHRONIC BILATERAL LOW BACK PAIN WITH RIGHT-SIDED SCIATICA: Primary | ICD-10-CM

## 2023-07-14 DIAGNOSIS — M62.81 MUSCLE WEAKNESS: ICD-10-CM

## 2023-07-14 DIAGNOSIS — M54.41 CHRONIC BILATERAL LOW BACK PAIN WITH RIGHT-SIDED SCIATICA: Primary | ICD-10-CM

## 2023-07-14 PROCEDURE — 97530 THERAPEUTIC ACTIVITIES: CPT | Mod: PN | Performed by: PHYSICAL THERAPIST

## 2023-07-14 PROCEDURE — 97110 THERAPEUTIC EXERCISES: CPT | Mod: PN | Performed by: PHYSICAL THERAPIST

## 2023-07-18 NOTE — PROGRESS NOTES
[facility]   Physical Therapy Treatment Note     Name: Babar Casper  Clinic Number: [id number]    Therapy Diagnosis:   Encounter Diagnoses   Name Primary?    Chronic bilateral low back pain with right-sided sciatica Yes    Muscle weakness      Physician: Raj Radford MD    Visit Date: 7/19/2023    Evaluation Date: 5/19/2023  Authorization Period Expiration 6-15-24  Plan of Care Expiration: 8-18-23  Progress Note Due: 8-7-23   Visit # / Visits authorized: 16/18  FOTO: Issued Visit #: 1     MD Follow up appointment: 7-19-23     Precautions: Standard       PTA Visit #: 0/5     Time In: 7:02  Time Out: 7:52  Total Billing Time (timed & untimed codes):50 minutes     SUBJECTIVE     Pt reports: went to work on Monday did light duty and did put monkey up and was ok and did mopping and sweeping. Pt had pain in back radiating to R buttock but not down the leg  Pt would get on floor to do push/pull and press up which does not get full relief.    Pt was compliant with home exercise program.  Response to previous treatment: felt good with no pain   Functional change walking or lying down during day has cushion for car and is improved able to lie on side with more ease   Pt has been able to squat with ADL  and able to lift cat's water bowl from floor which is 5-6# with water   back at work light duty     Pain: 2/10 currently highest at work 6/10 at end of day yesterday 3/10 after knee to chest and push/pull and press up 1/10 after Rx 0/10     Location: right LB    OBJECTIVE      Pelvic positioning: slight AR R at IE AR R      Treatment     Babar received the treatments listed below:      therapeutic exercises to develop strength, endurance, ROM, posture, and core stabilization for 15 minutes including:    Instructed pt to return to his work out at light weight as tolerated  Work restrictions limit 20# limit and no frequent twisting      Push/pull x 3  Press up x 10   Knee to chest x  10  Push/pull x 3  Press up x 10  Pt had slight increase in pain     Press up with sag x 10 2 sets Instructed pt to try up to 3 sets and hold all knee to chest for now    HELD TODAY  Pulldown with retraction with YTB x 20  Retraction with YTB x 20, cueing to make sure proper recruitment fatigue at 10 consider horizontal abd prone   Much cueing to decrease upper trap recruitment  ER with YTB x 20 focus on proper posture    HELD below in clinic due to time constraints  Arm and leg lift prone x 20  Horizontal abd prone neutral x 10       Bicep curls 5 # B 2/10 standing   Triceps with YTB sports cord x 2/10 standing    Push/pull x 3  Press up x 10    No pain at end today    Ultrasound  for pain control and to decrease inflammation @ continuous duty cycle, 1 Mhz, applied to L3-5 lumbar paraspinals, intensity = 1.8 w/cm2 for 8 minutes.  No change in symptoms       Therapeutic activities were performed to restore function for ADL and return to work  for 25 min  Discussed work activities and how often pt is performing press up.  During discussion had pt lie prone and he noted decrease in symptoms with unloading.  Instructed pt to make sure to eat quickly and lie down prone to rest as much as possible during his lunch break.  Instructed pt if possible to request a break as needed to lie prone for 10-15 min and see if able to resume work, but if continue with pain or worsens may need to leave work for the day ac if feel pain radiate down leg.   Reviewed how to properly mop and sweep.  Instructed pt in need to go straight home from work and lie prone and avoid household cleaning as much as possible to allow focus on rest and recovery from work.         Therapeutic activities were performed to improve function and decrease pain with sleep. Mattress is at highest level and still some pain, may need to address further but will wait and allow further accomodation to work,  but understands to continue to monitor pain upon  "awakening.  May need to consider new mattress     HELD BELOW DUE TO RETURN TO WORK  Pt has rubber boots for work and instructed pt he may go in dysfunction easier     10 reps of lifting 8# dumbbell up and placing on weight stack with 2 foam pads to simulate height pt feels high cage is and no dysfunction. Pt states monkey can be up to 40#   10 reps lifting 10# dumbbell as above  slight dysfunction and pain performed push/pull and press up and 0/10 pain at end  (NP)Pt also has low cages and simulated squat with no weight to get to low cage and went in dysfunction.      Pushed computer cart which pt felt simulated cart in clinic at 32" with 15# on cart for 2 min in clinic and reiterated position to maintain erect posture rather than leaning forward bending at trunk as pt stated he performed      Hot pack wrapped in towel(12.4#)  to simulate lifting and carrying monkey. Lifted from surgical table height which is 8" step on plinth and lifted and placed in carrying position, move monkey from one end of table to another and walk around carrying hot pack x 1 min Slight tightness at end noted pelvic dysfunction.  Performed push/pull press up and pain down to 0   Repeated activity after providing further cueing for proper lifting and carrying and no dysfunction at end.       (NP)Chon lumbar taping was performed to the lumbar paraspinals to maintain extension and provide biofeedback to avoid flexion activities and maintain extension principles.  Instructed pt in use, care and precautions with tape.          Patient Education and Home Exercises     Home Exercises Provided and Patient Education Provided     Education provided:   - Instructed pt in increased awareness of symptoms.  Instructed pt in push/pull at onset of increased symptoms.    - consideration lumbar brace for support  - HEP   - exercise in pain free zone       Written Home Exercises Provided:continue prior HEP  Exercises were reviewed and Babar was able to " demonstrate them prior to the end of the session.  Babar demonstrated good  understanding of the education provided. See EMR under Patient Instructions for exercises provided during therapy sessions    ASSESSMENT   Pt with return to work and increased symptoms as expected at light duty level with increased time up on feet.  Pt with TTP in lumbar paraspinals and with P/A ,but not much change after US indicating probably not muscle related and will still need to focus on extension principles and unloading as able.  Pt appears to understand to increase sets of press ups as needed and to add sag along with unloading as needed.  Pt's symptoms to not go further than upper buttock region so will continue with work and manage symptoms independently and continue work as he adjusts to schedule    Babar Is progressing well towards his goals.   Pt prognosis is Good.     Pt will continue to benefit from skilled outpatient physical therapy to address the goals listed in the initial evaluation, provide pt/family education and to maximize pt's level of independence in the home and community environment.     Pt's spiritual, cultural and educational needs considered and pt agreeable to plan of care and goals.     Anticipated barriers to physical therapy: none    GOALS:   Short Term Goals:  4 weeks MET STG's  Increase strength 1/2 muscle grade  Improve postural awareness of pelvis to independently identify dysfunction with min assist from PT  Be able to perform HEP with minimal cueing required     Long Term Goals: 8 weeks (Progressing, not met)  Improve muscle strength 1 muscle grade  Improve muscle strength with MMT to 4+/5 to 5/5  Improve and stabilize proper pelvic positioning  Restore ability to ambulate with normal pain free gait  Walking for ADL and work will be restored without increased pain  Restore ability to stand for ADL and work without increased pain  Restore ability to sit for ADL and work without increased pain  Restore  ability to participate in an exercise program for Wellness ;;  Restore ability to perform ADL's and household activities independently and without increased pain  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    Assist pt in full return to work     Selina Dempsey, PT, MSPT

## 2023-07-19 ENCOUNTER — CLINICAL SUPPORT (OUTPATIENT)
Dept: REHABILITATION | Facility: HOSPITAL | Age: 28
End: 2023-07-19
Payer: COMMERCIAL

## 2023-07-19 DIAGNOSIS — G89.29 CHRONIC BILATERAL LOW BACK PAIN WITH RIGHT-SIDED SCIATICA: Primary | ICD-10-CM

## 2023-07-19 DIAGNOSIS — M62.81 MUSCLE WEAKNESS: ICD-10-CM

## 2023-07-19 DIAGNOSIS — M54.41 CHRONIC BILATERAL LOW BACK PAIN WITH RIGHT-SIDED SCIATICA: Primary | ICD-10-CM

## 2023-07-19 PROCEDURE — 97530 THERAPEUTIC ACTIVITIES: CPT | Mod: PN | Performed by: PHYSICAL THERAPIST

## 2023-07-19 PROCEDURE — 97035 APP MDLTY 1+ULTRASOUND EA 15: CPT | Mod: PN | Performed by: PHYSICAL THERAPIST

## 2023-07-19 PROCEDURE — 97110 THERAPEUTIC EXERCISES: CPT | Mod: PN | Performed by: PHYSICAL THERAPIST

## 2023-07-24 NOTE — PROGRESS NOTES
[facility]   Physical Therapy Progress and Treatment Note     Name: Babar Casper  Clinic Number: [id number]    Therapy Diagnosis:   Encounter Diagnoses   Name Primary?    Chronic bilateral low back pain with right-sided sciatica Yes    Muscle weakness      Physician: Raj Radford MD    Visit Date: 7/25/2023    Evaluation Date: 5/19/2023  Authorization Period Expiration 6-15-24  Plan of Care Expiration: 9-19-23  Progress Note Due: 8-25-23   Visit # / Visits authorized: 18/18  FOTO: Issued Visit #: 1     MD Follow up appointment: not yet scheduled      Precautions: Standard       PTA Visit #: 0/5     Time In: 7:50  Time Out: 8:32  Total Billing Time (timed & untimed codes):42 minutes     SUBJECTIVE     Pt reports: he has gradually been able to adjust to work and having pain level up to 6/10 last week and would then hover to about 1/10  Pt states this week at worst 3/10 and can get to 0/10 this week.  Pt has been able perform job duties up to and below 20#  Pt states he has sometimes forgotten about pain and FB slightly without issues.  Pt will realize as he goes into FB to stop but no pain involved   Pt was compliant with home exercise program.  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 work light duty and ADL  generally maintaining extension principles sleep is improved and no pain upon awakening     Pain: 0/10 currently highest at work 3/10 this week      Location: right LB    OBJECTIVE         Functional assessment:   - walking: normal gait  at IE decreased trunk mobility noted with gait   - sit to stand: no difficulty at IE mod difficulty  - sit to supine: no difficulty at IE min difficulty       - supine to sit: no difficulty at IE min difficulty  - supine to prone: no difficulty at IE min difficulty     Lumbar active range of motion in standing is: 7-25-23  - flexion - able to FB slightly 10%  but not tested due to continued extension principles                     - extension -  100%                         - left side bending -  to knee         - right side bending -  to knee            Lumbar active range of motion in standing is: initial eval  - flexion - toes                     - extension -  75%                         - left side bending -  to knee         - right side bending -  to knee           Pelvic positioning: level at IE AR R     HS flexibility 90/90 25 R 30 L   Ankle DF 5 B        Muscle Strength 7-25-23  MMT R L   Hip flexion 5-/5 5-/5   Hip abduction 5/5 5/5   Hip extension 5/5 5/5   Ankle dorsiflexion 5/5 5/5   EHL 5/5 5/5   Ankle eversion 5/5 5/5            Muscle Strength initial eval  MMT R L   Hip flexion 3+/5 4/5   Hip abduction 4-/5 4/5   Hip extension 3+/5 4/5   Knee extension 5/5 5/5   Knee flexion 5/5 5/5   Ankle dorsiflexion 5/5 5/5   EHL 5/5 5/5   Ankle eversion 5/5 5/5         Endurance is good at IE not tested   \     Palpation:  min TTP lumbar paraspinals but noted specific area of mod-severe TTP R QL at insertion on ribs, no significant in gluteals/piriformis mod-severe rhomboids/middle trap mod tightness and TTP lumbar paraspinals and R gluteals/piriformis     Joint mobility: min decreased spring at IE mod decreased mobility  No lumbar dysfunction noted at IE FRS L L 3       PT reviewed FOTO scores for Babar Casper   FOTO intake score: 72 at IE 62   FOTO scores were entered into EPIC - see media section.  Treatment     Babar received the treatments listed below:      therapeutic exercises to develop strength, endurance, ROM, posture, and core stabilization for 27 minutes including:    Instructed pt to return to his work out at light weight as tolerated  Work restrictions limit 20# limit and no frequent twisting      Push/pull x 3  Press up x 10   (NP) Knee to chest x 10  Push/pull x 3  Press up x 10  Pt had slight increase in pain     Press up with sag x 10 2 sets  Instructed pt to try up to 3 sets and hold all knee to chest for now    HELD TODAY  Pulldown with retraction with YTB x 20  Retraction with YTB x 20, cueing to make sure proper recruitment fatigue at 10 consider horizontal abd prone   Much cueing to decrease upper trap recruitment  ER with YTB x 20 focus on proper posture    HELD below in clinic due to time constraints  Arm and leg lift prone x 20  Horizontal abd prone neutral x 10       Bicep curls 5 # B 2/10 standing   Triceps with YTB sports cord x 2/10 standing    Push/pull x 3  Press up x 10    No pain at end today      Therapeutic activities were performed to restore function for ADL and return to work  for 15 min  Discussed work activities and how often pt is performing press up and BB standing.  Instructed pt to continue to hold additional work out activities ac if flexion of trunk is involved.  Instructed pt further in maintaining extension principles with ADL due to continued symptoms and need to maintain .         Patient Education and Home Exercises     Home Exercises Provided and Patient Education Provided     Education provided:   - Instructed pt in increased awareness of symptoms.  Instructed pt in push/pull at onset of increased symptoms.    - consideration lumbar brace for support  - HEP   - exercise in pain free zone       Written Home Exercises Provided:continue prior HEP  Exercises were reviewed and Babar was able to demonstrate them prior to the end of the session.  Babar demonstrated good  understanding of the education provided. See EMR under Patient Instructions for exercises provided during therapy sessions    ASSESSMENT   Patient demonstrates improvement in range of motion, strength, stabilization and function.    Pt with gradual adjustment to light duty work.  Pt appears to understand continued work on extension protocol.  Pt may benefit from continued treatment at decreased frequency to see as needed over 8 weeks to progress with flexion  principles as tolerated and to restore full duty work activities.      Babar Is progressing well towards his goals.   Pt prognosis is Good.     Pt will continue to benefit from skilled outpatient physical therapy to address the goals listed in the initial evaluation, provide pt/family education and to maximize pt's level of independence in the home and community environment.     Pt's spiritual, cultural and educational needs considered and pt agreeable to plan of care and goals.     Anticipated barriers to physical therapy: none    GOALS:   Short Term Goals:  4 weeks MET STG's  Increase strength 1/2 muscle grade  Improve postural awareness of pelvis to independently identify dysfunction with min assist from PT  Be able to perform HEP with minimal cueing required     Long Term Goals: 8 weeks   Improve muscle strength 1 muscle grade MET   Improve muscle strength with MMT to 4+/5 to 5/5MET   Improve and stabilize proper pelvic positioningMET   Restore ability to ambulate with normal pain free gaitMET   Walking for ADL and work will be restored without increased pain (Progressing, not met)  Restore ability to stand for ADL and work without increased pain (Progressing, not met)  Restore ability to sit for ADL and work without increased pain (Progressing, not met)  Restore ability to participate in an exercise program for Wellness (Progressing, not met)  Restore ability to perform ADL's and household activities independently and without increased pain (Progressing, not met)  Restore ability to carry monkeys and cages as needed for work to perform full job duties as  #2 (Progressing, not met)       PLAN   If you concur, I recommend patient continue with physical therapy up to 1 time a week as needed for 8 weeks to assist pt in return to work full duty as needed.   Please advise us of your  recommendations. Thank you for allowing us to assist in the care of your patient.      Selina Dempsey, PT, MSPT

## 2023-07-25 ENCOUNTER — CLINICAL SUPPORT (OUTPATIENT)
Dept: REHABILITATION | Facility: HOSPITAL | Age: 28
End: 2023-07-25
Payer: COMMERCIAL

## 2023-07-25 ENCOUNTER — TELEPHONE (OUTPATIENT)
Dept: URGENT CARE | Facility: CLINIC | Age: 28
End: 2023-07-25
Payer: COMMERCIAL

## 2023-07-25 DIAGNOSIS — G89.29 CHRONIC BILATERAL LOW BACK PAIN WITH RIGHT-SIDED SCIATICA: Primary | ICD-10-CM

## 2023-07-25 DIAGNOSIS — M62.81 MUSCLE WEAKNESS: ICD-10-CM

## 2023-07-25 DIAGNOSIS — M54.41 CHRONIC BILATERAL LOW BACK PAIN WITH RIGHT-SIDED SCIATICA: Primary | ICD-10-CM

## 2023-07-25 PROCEDURE — 97110 THERAPEUTIC EXERCISES: CPT | Mod: PN | Performed by: PHYSICAL THERAPIST

## 2023-07-25 PROCEDURE — 97530 THERAPEUTIC ACTIVITIES: CPT | Mod: PN | Performed by: PHYSICAL THERAPIST

## 2023-07-25 NOTE — TELEPHONE ENCOUNTER
Called patient and I was unable to leave a message and call back number regarding [facility] missed appointment. States call can not be completed at this time.  Thanks [employer]

## 2023-07-25 NOTE — PLAN OF CARE
[facility]   Physical Therapy Progress and Treatment Note     Name: Babar Casper  Clinic Number: [id number]    Therapy Diagnosis:   Encounter Diagnoses   Name Primary?    Chronic bilateral low back pain with right-sided sciatica Yes    Muscle weakness      Physician: Raj Radford MD    Visit Date: 7/25/2023    Evaluation Date: 5/19/2023  Authorization Period Expiration 6-15-24  Plan of Care Expiration: 9-19-23  Progress Note Due: 8-25-23   Visit # / Visits authorized: 18/18  FOTO: Issued Visit #: 1     MD Follow up appointment: not yet scheduled      Precautions: Standard       PTA Visit #: 0/5     Time In: 7:50  Time Out: 8:32  Total Billing Time (timed & untimed codes):42 minutes     SUBJECTIVE     Pt reports: he has gradually been able to adjust to work and having pain level up to 6/10 last week and would then hover to about 1/10  Pt states this week at worst 3/10 and can get to 0/10 this week.  Pt has been able perform job duties up to and below 20#  Pt states he has sometimes forgotten about pain and FB slightly without issues.  Pt will realize as he goes into FB to stop but no pain involved   Pt was compliant with home exercise program.  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 work light duty and ADL  generally maintaining extension principles sleep is improved and no pain upon awakening     Pain: 0/10 currently highest at work 3/10 this week      Location: right LB    OBJECTIVE         Functional assessment:   - walking: normal gait  at IE decreased trunk mobility noted with gait   - sit to stand: no difficulty at IE mod difficulty  - sit to supine: no difficulty at IE min difficulty       - supine to sit: no difficulty at IE min difficulty  - supine to prone: no difficulty at IE min difficulty     Lumbar active range of motion in standing is: 7-25-23  - flexion - able to FB slightly 10%  but not tested due to continued extension principles                     - extension -  100%                         - left side bending -  to knee         - right side bending -  to knee            Lumbar active range of motion in standing is: initial eval  - flexion - toes                     - extension -  75%                         - left side bending -  to knee         - right side bending -  to knee           Pelvic positioning: level at IE AR R     HS flexibility 90/90 25 R 30 L   Ankle DF 5 B        Muscle Strength 7-25-23  MMT R L   Hip flexion 5-/5 5-/5   Hip abduction 5/5 5/5   Hip extension 5/5 5/5   Ankle dorsiflexion 5/5 5/5   EHL 5/5 5/5   Ankle eversion 5/5 5/5            Muscle Strength initial eval  MMT R L   Hip flexion 3+/5 4/5   Hip abduction 4-/5 4/5   Hip extension 3+/5 4/5   Knee extension 5/5 5/5   Knee flexion 5/5 5/5   Ankle dorsiflexion 5/5 5/5   EHL 5/5 5/5   Ankle eversion 5/5 5/5         Endurance is good at IE not tested   \     Palpation:  min TTP lumbar paraspinals but noted specific area of mod-severe TTP R QL at insertion on ribs, no significant in gluteals/piriformis mod-severe rhomboids/middle trap mod tightness and TTP lumbar paraspinals and R gluteals/piriformis     Joint mobility: min decreased spring at IE mod decreased mobility  No lumbar dysfunction noted at IE FRS L L 3       PT reviewed FOTO scores for Babar Casper   FOTO intake score: 72 at IE 62   FOTO scores were entered into EPIC - see media section.  Treatment     Babar received the treatments listed below:      therapeutic exercises to develop strength, endurance, ROM, posture, and core stabilization for 27 minutes including:    Instructed pt to return to his work out at light weight as tolerated  Work restrictions limit 20# limit and no frequent twisting      Push/pull x 3  Press up x 10   (NP) Knee to chest x 10  Push/pull x 3  Press up x 10  Pt had slight increase in pain     Press up with sag x 10 2 sets  Instructed pt to try up to 3 sets and hold all knee to chest for now    HELD TODAY  Pul[place]own with retraction with YTB x 20  Retraction with YTB x 20, cueing to make sure proper recruitment fatigue at 10 consider horizontal abd prone   Much cueing to decrease upper trap recruitment  ER with YTB x 20 focus on proper posture    HELD below in clinic due to time constraints  Arm and leg lift prone x 20  Horizontal abd prone neutral x 10       Bicep curls 5 # B 2/10 standing   Triceps with YTB sports cord x 2/10 standing    Push/pull x 3  Press up x 10    No pain at end today      Therapeutic activities were performed to restore function for ADL and return to work  for 15 min  Discussed work activities and how often pt is performing press up and BB standing.  Instructed pt to continue to hold additional work out activities ac if flexion of trunk is involved.  Instructed pt further in maintaining extension principles with ADL due to continued symptoms and need to maintain .         Patient Education and Home Exercises     Home Exercises Provided and Patient Education Provided     Education provided:   - Instructed pt in increased awareness of symptoms.  Instructed pt in push/pull at onset of increased symptoms.    - consideration lumbar brace for support  - HEP   - exercise in pain free zone       Written Home Exercises Provided:continue prior HEP  Exercises were reviewed and Babar was able to demonstrate them prior to the end of the session.  Babar demonstrated good  understanding of the education provided. See EMR under Patient Instructions for exercises provided during therapy sessions    ASSESSMENT   Patient demonstrates improvement in range of motion, strength, stabilization and function.    Pt with gradual adjustment to light duty work.  Pt appears to understand continued work on extension protocol.  Pt may benefit from continued treatment at decreased frequency to see as needed over 8 weeks to progress with flexion  principles as tolerated and to restore full duty work activities.      Babar Is progressing well towards his goals.   Pt prognosis is Good.     Pt will continue to benefit from skilled outpatient physical therapy to address the goals listed in the initial evaluation, provide pt/family education and to maximize pt's level of independence in the home and community environment.     Pt's spiritual, cultural and educational needs considered and pt agreeable to plan of care and goals.     Anticipated barriers to physical therapy: none    GOALS:   Short Term Goals:  4 weeks MET STG's  Increase strength 1/2 muscle grade  Improve postural awareness of pelvis to independently identify dysfunction with min assist from PT  Be able to perform HEP with minimal cueing required     Long Term Goals: 8 weeks   Improve muscle strength 1 muscle grade MET   Improve muscle strength with MMT to 4+/5 to 5/5MET   Improve and stabilize proper pelvic positioningMET   Restore ability to ambulate with normal pain free gaitMET   Walking for ADL and work will be restored without increased pain (Progressing, not met)  Restore ability to stand for ADL and work without increased pain (Progressing, not met)  Restore ability to sit for ADL and work without increased pain (Progressing, not met)  Restore ability to participate in an exercise program for Wellness (Progressing, not met)  Restore ability to perform ADL's and household activities independently and without increased pain (Progressing, not met)  Restore ability to carry monkeys and cages as needed for work to perform full job duties as  #2 (Progressing, not met)       PLAN   If you concur, I recommend patient continue with physical therapy up to 1 time a week as needed for 8 weeks to assist pt in return to work full duty as needed.   Please advise us of your  recommendations. Thank you for allowing us to assist in the care of your patient.      Selina Dempsey, PT, MSPT

## 2023-08-09 ENCOUNTER — OFFICE VISIT (OUTPATIENT)
Dept: URGENT CARE | Facility: CLINIC | Age: 28
End: 2023-08-09
Payer: OTHER MISCELLANEOUS

## 2023-08-09 VITALS
OXYGEN SATURATION: 99 % | SYSTOLIC BLOOD PRESSURE: 134 MMHG | TEMPERATURE: 98 F | HEART RATE: 43 BPM | BODY MASS INDEX: 20.9 KG/M2 | RESPIRATION RATE: 16 BRPM | WEIGHT: 146 LBS | DIASTOLIC BLOOD PRESSURE: 76 MMHG | HEIGHT: 70 IN

## 2023-08-09 DIAGNOSIS — M54.50 LUMBOSACRAL PAIN: ICD-10-CM

## 2023-08-09 DIAGNOSIS — M47.816 LUMBAR SPONDYLOSIS: ICD-10-CM

## 2023-08-09 DIAGNOSIS — M54.9 DORSALGIA, UNSPECIFIED: Primary | ICD-10-CM

## 2023-08-09 DIAGNOSIS — M54.10 RADICULAR PAIN OF LOWER EXTREMITY: ICD-10-CM

## 2023-08-09 PROCEDURE — 99213 OFFICE O/P EST LOW 20 MIN: CPT | Mod: S$GLB,,, | Performed by: FAMILY MEDICINE

## 2023-08-09 PROCEDURE — 99213 PR OFFICE/OUTPT VISIT, EST, LEVL III, 20-29 MIN: ICD-10-PCS | Mod: S$GLB,,, | Performed by: FAMILY MEDICINE

## 2023-08-09 NOTE — LETTER
Urgent Care - [place] [address], [address]  [facility] [address]  Phone: [phone]  Fax: [phone]  [facility]: [id number]    Pt Name: Babar Casper  Injury Date: 07/02/2018   Employee ID:[id number] Date of Treatment: 08/09/2023   Company: [employer]      Appointment Time: 07:45 AM Arrived: 800   Provider: Raj Radford MD Time Out:820     Office Treatment:   1. Dorsalgia, unspecified    2. Lumbosacral pain    3. Radicular pain of lower extremity    4. Lumbar spondylosis          Patient Instructions: Daily home exercises/warm soaks, Attention not to aggravate affected area    Restrictions: Regular Duty

## 2023-08-09 NOTE — PROGRESS NOTES
Subjective:      Patient ID: Babar Casper is a 27 y.o. male.    Chief Complaint: Follow-up    Pt presents to urgent care for a w/c follow up.  He works for [employer].  He states that his back is much better.  He does have soreness when he lifts things at some point.  He is ready to be released to go back to work fully.       7/12-Pt presents to urgent care for a w/c follow up.  He works for [employer].  He states that his back pain is much better.  He has been going to physical therapy and that is helping a lot.  He states that they denied his epidural, but he is over it.  He is ready to go back to work on Monday, on light duty.  Pain scale- 1 soreness. He has one muscle relaxer left, ran out of the naproxen.  He has been taking ibuprofen as needed.  DOI- 4/27/2023.    Follow-up  This is a new problem. The current episode started more than 1 month ago. The problem occurs constantly. The problem has been resolved. Nothing aggravates the symptoms. He has tried nothing for the symptoms. The treatment provided no relief.     ROS  Objective:     Physical Exam   FROM c/t/L-spine with minimal discomfort  Assessment:      1. Dorsalgia, unspecified    2. Lumbosacral pain    3. Radicular pain of lower extremity    4. Lumbar spondylosis      Plan:          Patient Instructions: Daily home exercises/warm soaks, Attention not to aggravate affected area   Restrictions: Regular Duty  No follow-ups on file.

## 2023-08-11 ENCOUNTER — DOCUMENTATION ONLY (OUTPATIENT)
Dept: REHABILITATION | Facility: HOSPITAL | Age: 28
End: 2023-08-11
Payer: COMMERCIAL

## 2023-08-11 DIAGNOSIS — G89.29 CHRONIC BILATERAL LOW BACK PAIN WITH RIGHT-SIDED SCIATICA: Primary | ICD-10-CM

## 2023-08-11 DIAGNOSIS — M54.41 CHRONIC BILATERAL LOW BACK PAIN WITH RIGHT-SIDED SCIATICA: Primary | ICD-10-CM

## 2023-08-11 DIAGNOSIS — M62.81 MUSCLE WEAKNESS: ICD-10-CM

## 2023-08-11 NOTE — PROGRESS NOTES
[facility]   Discharge Note    Name: Babar Casper  [facility] Number: [id number]    Therapy Diagnosis:   Encounter Diagnoses   Name Primary?    Chronic bilateral low back pain with right-sided sciatica Yes    Muscle weakness      Physician: Raj Radford MD    Physician Orders: PT Eval and Treat   Medical Diagnosis from Referral:   M54.50 (ICD-10-CM) - Lumbosacral pain   M54.9 (ICD-10-CM) - Dorsalgia, unspecified   M54.16 (ICD-10-CM) - Lumbar radiculopathy, chronic   S39.012D (ICD-10-CM) - Acute myofascial strain of lumbar region, subsequent encounter      Evaluation Date: 5/19/2023    Date of Last visit: 7-25-23  Total Visits Received: 18    ASSESSMENT      Patient demonstrates improvement in range of motion, strength, stabilization and function.    Pt with gradual adjustment to light duty work.  Pt appears to understand continued work on extension protocol. Pt was seen by MD on 8-9-23 and pt reported that back was much better and MD released pt back to work full duty     Discharge reason: Other:  Pt was released back to full duty work    Discharge FOTO Score: 72    Goals:   Short Term Goals:  4 weeks MET STG's  Increase strength 1/2 muscle grade  Improve postural awareness of pelvis to independently identify dysfunction with min assist from PT  Be able to perform HEP with minimal cueing required     Long Term Goals: 8 weeks   Improve muscle strength 1 muscle grade MET   Improve muscle strength with MMT to 4+/5 to 5/5MET   Improve and stabilize proper pelvic positioningMET   Restore ability to ambulate with normal pain free gaitMET     Pt now discharged to full duty work so appears to have achieved functional goals  Walking for ADL and work will be restored without increased pain  Restore ability to stand for ADL and work without increased pain   Restore ability to sit for ADL and work without increased pain   Restore ability to participate in an exercise program for Wellness    Restore ability to perform ADL's and household activities independently and without increased pain   Restore ability to carry monkeys and cages as needed for work to perform full job duties as  #2    PLAN   This patient is discharged from Physical Therapy      Selina Dempsey, PT

## 2024-03-11 ENCOUNTER — OFFICE VISIT (OUTPATIENT)
Dept: URGENT CARE | Facility: CLINIC | Age: 29
End: 2024-03-11
Payer: COMMERCIAL

## 2024-03-11 ENCOUNTER — OFFICE VISIT (OUTPATIENT)
Dept: URGENT CARE | Facility: CLINIC | Age: 29
End: 2024-03-11
Payer: OTHER MISCELLANEOUS

## 2024-03-11 VITALS
RESPIRATION RATE: 16 BRPM | HEART RATE: 92 BPM | OXYGEN SATURATION: 98 % | TEMPERATURE: 98 F | WEIGHT: 144 LBS | DIASTOLIC BLOOD PRESSURE: 69 MMHG | SYSTOLIC BLOOD PRESSURE: 119 MMHG | BODY MASS INDEX: 20.62 KG/M2 | HEIGHT: 70 IN

## 2024-03-11 DIAGNOSIS — R10.9 ABDOMINAL PAIN, UNSPECIFIED ABDOMINAL LOCATION: ICD-10-CM

## 2024-03-11 DIAGNOSIS — M54.16 LUMBAR RADICULOPATHY, CHRONIC: ICD-10-CM

## 2024-03-11 DIAGNOSIS — R68.83 CHILLS: Primary | ICD-10-CM

## 2024-03-11 DIAGNOSIS — R11.2 NAUSEA AND VOMITING, UNSPECIFIED VOMITING TYPE: ICD-10-CM

## 2024-03-11 DIAGNOSIS — M47.816 LUMBAR SPONDYLOSIS: ICD-10-CM

## 2024-03-11 DIAGNOSIS — M54.50 LUMBOSACRAL PAIN: ICD-10-CM

## 2024-03-11 DIAGNOSIS — M54.10 RADICULAR PAIN OF LOWER EXTREMITY: ICD-10-CM

## 2024-03-11 DIAGNOSIS — Z02.6 ENCOUNTER RELATED TO WORKER'S COMPENSATION CLAIM: Primary | ICD-10-CM

## 2024-03-11 LAB
CTP QC/QA: YES
CTP QC/QA: YES
POC MOLECULAR INFLUENZA A AGN: NEGATIVE
POC MOLECULAR INFLUENZA B AGN: NEGATIVE
SARS-COV-2 AG RESP QL IA.RAPID: NEGATIVE

## 2024-03-11 PROCEDURE — 96372 THER/PROPH/DIAG INJ SC/IM: CPT | Mod: S$GLB,,, | Performed by: FAMILY MEDICINE

## 2024-03-11 PROCEDURE — 99203 OFFICE O/P NEW LOW 30 MIN: CPT | Mod: 25,S$GLB,, | Performed by: FAMILY MEDICINE

## 2024-03-11 PROCEDURE — 87502 INFLUENZA DNA AMP PROBE: CPT | Mod: QW,S$GLB,, | Performed by: FAMILY MEDICINE

## 2024-03-11 PROCEDURE — 87811 SARS-COV-2 COVID19 W/OPTIC: CPT | Mod: QW,S$GLB,, | Performed by: FAMILY MEDICINE

## 2024-03-11 PROCEDURE — 99214 OFFICE O/P EST MOD 30 MIN: CPT | Mod: 25,S$GLB,, | Performed by: FAMILY MEDICINE

## 2024-03-11 RX ORDER — METHYLPREDNISOLONE 4 MG/1
TABLET ORAL
Qty: 21 EACH | Refills: 0 | Status: SHIPPED | OUTPATIENT
Start: 2024-03-11

## 2024-03-11 RX ORDER — ONDANSETRON 4 MG/1
4 TABLET, ORALLY DISINTEGRATING ORAL EVERY 8 HOURS PRN
Qty: 30 TABLET | Refills: 1 | Status: SHIPPED | OUTPATIENT
Start: 2024-03-11

## 2024-03-11 RX ORDER — NAPROXEN 500 MG/1
500 TABLET ORAL 2 TIMES DAILY
Qty: 30 TABLET | Refills: 1 | Status: SHIPPED | OUTPATIENT
Start: 2024-03-11

## 2024-03-11 RX ORDER — ONDANSETRON 2 MG/ML
4 INJECTION INTRAMUSCULAR; INTRAVENOUS
Status: COMPLETED | OUTPATIENT
Start: 2024-03-11 | End: 2024-03-11

## 2024-03-11 RX ORDER — METHOCARBAMOL 500 MG/1
500 TABLET, FILM COATED ORAL 2 TIMES DAILY PRN
Qty: 30 TABLET | Refills: 1 | Status: SHIPPED | OUTPATIENT
Start: 2024-03-11

## 2024-03-11 RX ADMIN — ONDANSETRON 4 MG: 2 INJECTION INTRAMUSCULAR; INTRAVENOUS at 12:03

## 2024-03-11 NOTE — LETTER
[facility] - [place] [address], [address]  [facility] [address]  Phone: [phone]  Fax: [phone]  [facility]: [id number]    Pt Name: Babar Casper  Injury Date: 03/08/2024   Employee ID: [id number] Date of First Treatment: 03/11/2024   Company: [employer]      Appointment Time: 10:50 AM Arrived: 1100   Provider: Raj Radford MD Time Out:1230     Office Treatment:   1. Encounter related to worker's compensation claim    2. Lumbar radiculopathy, chronic    3. Lumbar spondylosis    4. Radicular pain of lower extremity    5. Lumbosacral pain      Medications Ordered This Encounter   Medications    methocarbamoL (ROBAXIN) 500 MG Tab    methylPREDNISolone (MEDROL DOSEPACK) 4 mg tablet    naproxen (NAPROSYN) 500 MG tablet        Referral to PT and Pain mgmt placed.    Restrictions:  (sedentary work)     Return Appointment: 3/18 or sooner if needed

## 2024-03-11 NOTE — PROGRESS NOTES
Subjective:      Patient ID: Babar Casper is a 28 y.o. male.    Vitals:  vitals were not taken for this visit.     Chief Complaint: Emesis    28 yr old male patient presents today with complaints of vomiting, chills, body aches that started this morning. Patient has not taken anything OTC for his symptoms.     Emesis       Gastrointestinal:  Positive for vomiting.      Objective:     Physical Exam    Physical Exam  Vitals signs and nursing note reviewed.   Constitutional:       Appearance: Pt is well-developed. Alert, NAD.  Pt is cooperative.  Non-toxic appearance.  HENT:      Head: Normocephalic and atraumatic. .      Right Ear: External ear normal.      Left Ear: External ear normal.   Eyes:      General: Lids are normal.      Conjunctiva/sclera: Conjunctivae normal. Visual tracking is normal. Right eye exhibits no exudate. Left eye exhibits no exudate. No scleral icterus.     Pupils: Pupils are equal, round  Neck:      Musculoskeletal: range of motion without pain and neck supple.      Trachea: Trachea and phonation normal.   Throat: No apparent pharyngeal edema or swelling no tonsil swelling or asymmetry  Cardiovascular:      Rate and Rhythm: Normal Rhythm. Extremities well perfused.   Pulmonary:      Effort: Pulmonary effort is normal. No respiratory distress. NO stridor or difficulty breathing     Breath sounds: Normal breath sounds.   Abdomen: NO obvious distention. Tender in all quadrants with guarding of lower abd.   Musculoskeletal: Normal range of motion. No ambulation issues. Left sided low back pain.   Skin:     General: Skin is warm and dry. No open wounds or abrasions. No petechiae No cyanosis  no jaundice not diaphoretic, not pale, not purpuric  Neurological:      Mental Status:Pt is alert and oriented to person, place, and time.   Psychiatric:         Speech: Speech normal.         Behavior: Behavior normal.         Thought Content: Thought content normal.         Judgment: Judgment normal.        Assessment:     1. Chills    2. Nausea and vomiting, unspecified vomiting type    3. Abdominal pain, unspecified abdominal location        Plan:   Given significant abd pain- advised pt to go to the ER for further workup.     Chills  -     SARS Coronavirus 2 Antigen, POCT Manual Read  -     POCT Influenza A/B MOLECULAR    Nausea and vomiting, unspecified vomiting type    Abdominal pain, unspecified abdominal location    Other orders  -     ondansetron (ZOFRAN-ODT) 4 MG TbDL; Take 1 tablet (4 mg total) by mouth every 8 (eight) hours as needed.  Dispense: 30 tablet; Refill: 1  -     ondansetron injection 4 mg

## 2024-03-11 NOTE — PROGRESS NOTES
Subjective:      Patient ID: Babar Casper is a 28 y.o. male.    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 7.     Back Pain  This is a new problem. The current episode started in the past 7 days. The problem occurs constantly. The problem is unchanged. The pain radiates to the left thigh. The pain is at a severity of 7/10. The pain is moderate. The symptoms are aggravated by bending, sitting and standing. He has tried NSAIDs for the symptoms. The treatment provided mild relief.     Musculoskeletal:  Positive for back pain.     Objective:     Physical Exam  Musculoskeletal:         General: Tenderness present.      Lumbar back: Tenderness present. Decreased range of motion.        Back:         Assessment:      1. Encounter related to worker's compensation claim    2. Lumbar radiculopathy, chronic    3. Lumbar spondylosis    4. Radicular pain of lower extremity    5. Lumbosacral pain      Plan:   Pt also having a concurrent GI bug- n/v so exam is significantly limited due to his distress. Will place on sedentary restrictions and return next week.     Medications Ordered This Encounter   Medications    methocarbamoL (ROBAXIN) 500 MG Tab     Sig: Take 1 tablet (500 mg total) by mouth 2 (two) times daily as needed.     Dispense:  30 tablet     Refill:  1    methylPREDNISolone (MEDROL DOSEPACK) 4 mg tablet     Sig: use as directed     Dispense:  21 each     Refill:  0    naproxen (NAPROSYN) 500 MG tablet     Sig: Take 1 tablet (500 mg total) by mouth 2 (two) times daily.     Dispense:  30 tablet     Refill:  1            No follow-ups on file.

## 2024-03-11 NOTE — LETTER
March 11, 2024      [facility] [address]  [facility] [address]  Phone: [phone]  Fax: [phone]       Patient: Babar Casper   YOB: 1995  Date of Visit: 03/11/2024    To Whom It May Concern:    Sanjiv Casper  was at [facility] on 03/11/2024. Please excuse patient for days missed from work. If you have any questions or concerns, or if I can be of further assistance, please do not hesitate to contact me.    Sincerely,    Yeny Hernandez MA

## 2024-03-18 ENCOUNTER — OFFICE VISIT (OUTPATIENT)
Dept: URGENT CARE | Facility: CLINIC | Age: 29
End: 2024-03-18
Payer: OTHER MISCELLANEOUS

## 2024-03-18 VITALS
OXYGEN SATURATION: 96 % | HEART RATE: 55 BPM | BODY MASS INDEX: 21.47 KG/M2 | DIASTOLIC BLOOD PRESSURE: 72 MMHG | WEIGHT: 150 LBS | RESPIRATION RATE: 16 BRPM | HEIGHT: 70 IN | TEMPERATURE: 98 F | SYSTOLIC BLOOD PRESSURE: 118 MMHG

## 2024-03-18 DIAGNOSIS — M54.16 LUMBAR RADICULOPATHY, CHRONIC: ICD-10-CM

## 2024-03-18 DIAGNOSIS — M54.9 DORSALGIA, UNSPECIFIED: ICD-10-CM

## 2024-03-18 DIAGNOSIS — Z02.6 ENCOUNTER RELATED TO WORKER'S COMPENSATION CLAIM: Primary | ICD-10-CM

## 2024-03-18 PROCEDURE — 99213 OFFICE O/P EST LOW 20 MIN: CPT | Mod: S$GLB,,, | Performed by: FAMILY MEDICINE

## 2024-03-18 NOTE — LETTER
[facility] [place] [address], [address]  [facility] [address]  Phone: [phone]  Fax: [phone]  [facility]: [id number]    Pt Name: Babar Casper  Injury Date: 03/08/2024   Employee ID: [id number] Date of Treatment: 03/18/2024   Company: [employer]      Appointment Time: 07:45 AM Arrived:    08:10am   Provider: Raj Radford MD Time Out: 09:05am     Office Treatment:   1. Encounter related to worker's compensation claim    2. Dorsalgia, unspecified    3. Lumbar radiculopathy, chronic               Restrictions: No lifting/pushing/pulling more than 10 lbs, Avoid frequent bending/lifting/twisting, Sit or stand as needed (progress to regular duty as tolerated.)    You have a work related injury. Medical care and treatment required as a result of a work-related injury  should be focused on restoring functional ability required to meet your daily and work activities and return to work, while striving to restore your health to pre-injury status in so far as is feasible.  If Rx a medication that can be sedating, DO NOT TAKE DURING YOUR WORK DAY.    Some OTC measures to help in recovery(if no allergies to, renal issues or pregnant):  Tylenol 325mg 3x per day  Ibuprofen 400mg 3x per day OR Aleve regular strength one tablet 2x per day  Take Pepcid 20mg BID  If applicable or discussed: Magnesium OTC daily; Topical Voltaren Gel; Lidocaine patches, neoprene OTC compression sleeve  Massage area if possible  Resting of the injured area  Ice for ankle, wrist or elbow injury  Elevation of the injured area if applicable  Heating pad for muscle injury  Stretching/ROM exercises as described in clinic.   ** BE ADVISED: You should be in regular contact with your W/C  to know the status of your claim and /or (if any)pending referrals**       Return Appointment: 4/5/2024

## 2024-03-18 NOTE — PROGRESS NOTES
Subjective:      Patient ID: Babar Casper is a 28 y.o. male.    Chief Complaint: Back Pain    Patient's place of employment - 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              3/11/2024-Patient works at  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 7.       Back Pain  This is a new problem. The current episode started in the past 7 days. The problem occurs constantly. The problem has been gradually improving since onset. The pain radiates to the right thigh and left thigh. The pain is at a severity of 2/10. The pain is mild. The pain is Worse during the day. The symptoms are aggravated by sitting, bending, lying down and twisting. Stiffness is present In the morning. Treatments tried: naproxen and methocarbomal. The treatment provided mild relief.       Musculoskeletal:  Positive for back pain.     Objective:     Physical Exam  Musculoskeletal:         General: Tenderness present. Normal range of motion.      Lumbar back: Tenderness present. Negative right straight leg raise test and negative left straight leg raise test.        Back:           Assessment:      1. Encounter related to worker's compensation claim    2. Dorsalgia, unspecified    3. Lumbar radiculopathy, chronic      Plan:     Pt with known lumbar pathology- was referred for KENISHA but WC denied.     He is feeling better today- will inc work load and see back in 2 weeks.     GI issues at last visit have resolved as well.          Restrictions: No  lifting/pushing/pulling more than 10 lbs, Avoid frequent bending/lifting/twisting, Sit or stand as needed (progress to regular duty as tolerated.)  No follow-ups on file.

## 2024-03-18 NOTE — LETTER
[facility] [place] [address], [address]  [facility] [address]  Phone: [phone]  Fax: [phone]  [facility]: [id number]    Pt Name: Babar Casper  Injury Date: 03/08/2024   Employee ID: [id number] Date of Treatment: 03/18/2024   Company: [employer]      Appointment Time: 07:45 AM Arrived: 810   Provider: Raj Radford MD Time Out:905     Office Treatment:   1. Encounter related to worker's compensation claim    2. Dorsalgia, unspecified    3. Lumbar radiculopathy, chronic               Restrictions: No lifting/pushing/pulling more than 10 lbs, Avoid frequent bending/lifting/twisting, Sit or stand as needed (progress to regular duty as tolerated.)     Return Appointment: 45 or sooner if needed     You have a work related injury. Medical care and treatment required as a result of a work-related injury  should be focused on restoring functional ability required to meet your daily and work activities and return to work, while striving to restore your health to pre-injury status in so far as is feasible.  If Rx a medication that can be sedating, DO NOT TAKE DURING YOUR WORK DAY.    Some OTC measures to help in recovery(if no allergies to, renal issues or pregnant):  Tylenol 325mg 3x per day  Ibuprofen 400mg 3x per day OR Aleve regular strength one tablet 2x per day  Take Pepcid 20mg BID  If applicable or discussed: Magnesium OTC daily; Topical Voltaren Gel; Lidocaine patches, neoprene OTC compression sleeve  Massage area if possible  Resting of the injured area  Ice for ankle, wrist or elbow injury  Elevation of the injured area if applicable  Heating pad for muscle injury  Stretching/ROM exercises as described in clinic.   ** BE ADVISED: You should be in regular contact with your W/C  to know the status of your claim and /or (if any)pending referrals**

## 2024-04-05 ENCOUNTER — OFFICE VISIT (OUTPATIENT)
Dept: URGENT CARE | Facility: CLINIC | Age: 29
End: 2024-04-05
Payer: OTHER MISCELLANEOUS

## 2024-04-05 VITALS
RESPIRATION RATE: 18 BRPM | OXYGEN SATURATION: 97 % | TEMPERATURE: 98 F | SYSTOLIC BLOOD PRESSURE: 128 MMHG | HEIGHT: 70 IN | DIASTOLIC BLOOD PRESSURE: 78 MMHG | WEIGHT: 149.94 LBS | BODY MASS INDEX: 21.47 KG/M2 | HEART RATE: 45 BPM

## 2024-04-05 DIAGNOSIS — Z02.6 ENCOUNTER RELATED TO WORKER'S COMPENSATION CLAIM: Primary | ICD-10-CM

## 2024-04-05 DIAGNOSIS — M47.816 LUMBAR SPONDYLOSIS: ICD-10-CM

## 2024-04-05 DIAGNOSIS — M54.9 DORSALGIA, UNSPECIFIED: ICD-10-CM

## 2024-04-05 PROCEDURE — 99213 OFFICE O/P EST LOW 20 MIN: CPT | Mod: S$GLB,,, | Performed by: FAMILY MEDICINE

## 2024-04-05 NOTE — PROGRESS NOTES
Subjective:      Patient ID: Babar Casper is a 28 y.o. male.    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           Back Pain  The current episode started 1 to 4 weeks ago. The problem occurs intermittently. The problem has been gradually improving since onset. The pain is present in the sacro-iliac. The quality of the pain is described as shooting. Radiates to: 2-3 times in the past 4 weeks, pain has radiated down the right leg. The pain is at a severity of 1/10. The pain is moderate. The pain is Worse during the day. The symptoms are aggravated by bending and twisting. Stiffness is present In the morning. Associated symptoms include leg pain. He has tried muscle relaxant for the symptoms. The treatment provided mild relief.       Musculoskeletal:  Positive for back pain.     Objective:     Physical Exam   FROM C/T/L spine without real pain.   Assessment:      1. Encounter related to worker's compensation claim    2. Lumbar spondylosis    3. Dorsalgia, unspecified      Plan:              Restrictions: Regular Duty, Discharged from Occupational Health  No follow-ups on file.

## 2024-04-05 NOTE — LETTER
[facility] [place] [address], [address]  [facility] [address]  Phone: [phone]  Fax: [phone]  [facility]: [id number]    Pt Name: Babar Casper  Injury Date: 07/02/2018   Employee ID: [id number] Date of First Treatment: 04/05/2024   Company: [employer]      Appointment Time: 07:45 AM Arrived: 800   Provider: Raj Radford MD Time Out:830     Office Treatment:   1. Encounter related to worker's compensation claim    2. Lumbar spondylosis    3. Dorsalgia, unspecified               Restrictions: Regular Duty, Discharged from Occupational Health

## 2025-06-06 ENCOUNTER — OFFICE VISIT (OUTPATIENT)
Dept: URGENT CARE | Facility: CLINIC | Age: 30
End: 2025-06-06
Payer: COMMERCIAL

## 2025-06-06 VITALS
HEART RATE: 64 BPM | SYSTOLIC BLOOD PRESSURE: 124 MMHG | OXYGEN SATURATION: 98 % | WEIGHT: 149 LBS | BODY MASS INDEX: 21.33 KG/M2 | HEIGHT: 70 IN | RESPIRATION RATE: 16 BRPM | DIASTOLIC BLOOD PRESSURE: 79 MMHG | TEMPERATURE: 99 F

## 2025-06-06 DIAGNOSIS — M54.9 DORSALGIA, UNSPECIFIED: ICD-10-CM

## 2025-06-06 DIAGNOSIS — M47.816 LUMBAR SPONDYLOSIS: ICD-10-CM

## 2025-06-06 DIAGNOSIS — Z02.6 ENCOUNTER RELATED TO WORKER'S COMPENSATION CLAIM: Primary | ICD-10-CM

## 2025-06-06 RX ORDER — METHOCARBAMOL 500 MG/1
500 TABLET, FILM COATED ORAL 2 TIMES DAILY PRN
Qty: 30 TABLET | Refills: 1 | Status: SHIPPED | OUTPATIENT
Start: 2025-06-06

## 2025-06-06 RX ORDER — LIDOCAINE 50 MG/G
1 PATCH TOPICAL DAILY
Qty: 15 PATCH | Refills: 1 | Status: SHIPPED | OUTPATIENT
Start: 2025-06-06

## 2025-06-06 RX ORDER — DICLOFENAC SODIUM 10 MG/G
2 GEL TOPICAL 4 TIMES DAILY
Qty: 100 G | Refills: 1 | Status: SHIPPED | OUTPATIENT
Start: 2025-06-06

## 2025-06-06 RX ORDER — MELOXICAM 7.5 MG/1
7.5 TABLET ORAL DAILY
Qty: 30 TABLET | Refills: 1 | Status: SHIPPED | OUTPATIENT
Start: 2025-06-06

## 2025-06-11 ENCOUNTER — OFFICE VISIT (OUTPATIENT)
Dept: URGENT CARE | Facility: CLINIC | Age: 30
End: 2025-06-11
Payer: COMMERCIAL

## 2025-06-11 VITALS
HEART RATE: 55 BPM | DIASTOLIC BLOOD PRESSURE: 71 MMHG | SYSTOLIC BLOOD PRESSURE: 126 MMHG | OXYGEN SATURATION: 99 % | RESPIRATION RATE: 16 BRPM

## 2025-06-11 DIAGNOSIS — M54.9 DORSALGIA, UNSPECIFIED: ICD-10-CM

## 2025-06-11 DIAGNOSIS — Z02.6 ENCOUNTER RELATED TO WORKER'S COMPENSATION CLAIM: Primary | ICD-10-CM

## 2025-06-11 DIAGNOSIS — M47.816 LUMBAR SPONDYLOSIS: ICD-10-CM

## 2025-06-11 PROCEDURE — 99213 OFFICE O/P EST LOW 20 MIN: CPT | Mod: S$GLB,,, | Performed by: FAMILY MEDICINE

## 2025-06-11 NOTE — PROGRESS NOTES
Subjective:      Patient ID: Babar Casper is a 29 y.o. male.    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  The pain is in the spot as last time.    What have you taken OTC for your symptoms: n/a  What is your current pain scale out of 10? 7         Follow-up  This is a new problem. The current episode started in the past 7 days. The problem occurs rarely. The problem has been resolved. Nothing aggravates the symptoms. Treatments tried: meloxicam, epsom salts. The treatment provided significant relief.     ROS  Objective:     Physical Exam   Improved ROM with less pain  Assessment:      1. Encounter related to worker's compensation claim    2. Dorsalgia, unspecified    3. Lumbar spondylosis      Plan:   Pt is ready to return to full duty.        Patient Instructions: Daily home exercises/warm soaks (proper lifting mechanics as we discussed in clinic)   Restrictions: Regular Duty  No follow-ups on file.

## 2025-06-11 NOTE — LETTER
[facility] [place] [address], [address]  [facility] [address]  Phone: [phone]  Fax: [phone]  [facility]: [id number]    Pt Name: Babar Casper  Injury Date:  6/6/25   Employee ID: [id number] Date of First Treatment: 06/11/2025   Company: [employer]      Appointment Time: 08:15 AM Arrived: 830   Provider: Raj Radford MD Time Out:845     Office Treatment:   1. Encounter related to worker's compensation claim    2. Dorsalgia, unspecified    3. Lumbar spondylosis          Patient Instructions: Daily home exercises/warm soaks (proper lifting mechanics as we discussed in clinic)    Restrictions: Regular Duty